# Patient Record
Sex: MALE | Race: OTHER | Employment: OTHER | URBAN - METROPOLITAN AREA
[De-identification: names, ages, dates, MRNs, and addresses within clinical notes are randomized per-mention and may not be internally consistent; named-entity substitution may affect disease eponyms.]

---

## 2017-01-31 ENCOUNTER — HOSPITAL ENCOUNTER (EMERGENCY)
Age: 77
Discharge: HOME | End: 2017-02-01
Attending: EMERGENCY MEDICINE | Admitting: EMERGENCY MEDICINE
Payer: MEDICARE

## 2017-01-31 ENCOUNTER — APPOINTMENT (OUTPATIENT)
Dept: GENERAL RADIOLOGY | Age: 77
End: 2017-01-31
Attending: EMERGENCY MEDICINE
Payer: MEDICARE

## 2017-01-31 DIAGNOSIS — R07.9 CHEST PAIN, UNSPECIFIED TYPE: Primary | ICD-10-CM

## 2017-01-31 DIAGNOSIS — J90 PLEURAL EFFUSION: ICD-10-CM

## 2017-01-31 DIAGNOSIS — I10 ESSENTIAL HYPERTENSION: ICD-10-CM

## 2017-01-31 LAB
CK MB CFR SERPL CALC: 1.7 % (ref 0–4)
CK MB SERPL-MCNC: 0.6 NG/ML (ref 0.5–3.6)
CK SERPL-CCNC: 36 U/L (ref 39–308)
TROPONIN I SERPL-MCNC: <0.02 NG/ML (ref 0–0.04)

## 2017-01-31 PROCEDURE — 85027 COMPLETE CBC AUTOMATED: CPT | Performed by: EMERGENCY MEDICINE

## 2017-01-31 PROCEDURE — 85379 FIBRIN DEGRADATION QUANT: CPT | Performed by: EMERGENCY MEDICINE

## 2017-01-31 PROCEDURE — 93005 ELECTROCARDIOGRAM TRACING: CPT

## 2017-01-31 PROCEDURE — 80048 BASIC METABOLIC PNL TOTAL CA: CPT | Performed by: EMERGENCY MEDICINE

## 2017-01-31 PROCEDURE — 83880 ASSAY OF NATRIURETIC PEPTIDE: CPT | Performed by: EMERGENCY MEDICINE

## 2017-01-31 PROCEDURE — 71010 XR CHEST PORT: CPT

## 2017-01-31 PROCEDURE — 99285 EMERGENCY DEPT VISIT HI MDM: CPT

## 2017-01-31 PROCEDURE — 82550 ASSAY OF CK (CPK): CPT | Performed by: EMERGENCY MEDICINE

## 2017-02-01 ENCOUNTER — APPOINTMENT (OUTPATIENT)
Dept: CT IMAGING | Age: 77
End: 2017-02-01
Attending: EMERGENCY MEDICINE
Payer: MEDICARE

## 2017-02-01 VITALS
BODY MASS INDEX: 26.81 KG/M2 | HEIGHT: 69 IN | OXYGEN SATURATION: 96 % | RESPIRATION RATE: 27 BRPM | HEART RATE: 105 BPM | WEIGHT: 181 LBS | DIASTOLIC BLOOD PRESSURE: 85 MMHG | SYSTOLIC BLOOD PRESSURE: 150 MMHG

## 2017-02-01 LAB
ANION GAP BLD CALC-SCNC: 9 MMOL/L (ref 3–18)
ATRIAL RATE: 101 BPM
ATRIAL RATE: 86 BPM
BNP SERPL-MCNC: 1809 PG/ML (ref 0–900)
BUN SERPL-MCNC: 20 MG/DL (ref 7–18)
BUN/CREAT SERPL: 24 (ref 12–20)
CALCIUM SERPL-MCNC: 8.5 MG/DL (ref 8.5–10.1)
CALCULATED P AXIS, ECG09: 28 DEGREES
CALCULATED P AXIS, ECG09: 46 DEGREES
CALCULATED R AXIS, ECG10: 15 DEGREES
CALCULATED R AXIS, ECG10: 29 DEGREES
CALCULATED T AXIS, ECG11: 3 DEGREES
CALCULATED T AXIS, ECG11: 5 DEGREES
CHLORIDE SERPL-SCNC: 102 MMOL/L (ref 100–108)
CK MB CFR SERPL CALC: 2.1 % (ref 0–4)
CK MB SERPL-MCNC: 0.8 NG/ML (ref 0.5–3.6)
CK SERPL-CCNC: 38 U/L (ref 39–308)
CO2 SERPL-SCNC: 26 MMOL/L (ref 21–32)
CREAT SERPL-MCNC: 0.85 MG/DL (ref 0.6–1.3)
D DIMER PPP FEU-MCNC: 1.39 UG/ML(FEU)
DIAGNOSIS, 93000: NORMAL
DIAGNOSIS, 93000: NORMAL
ERYTHROCYTE [DISTWIDTH] IN BLOOD BY AUTOMATED COUNT: 14 % (ref 11.6–14.5)
GLUCOSE SERPL-MCNC: 100 MG/DL (ref 74–99)
HCT VFR BLD AUTO: 36.2 % (ref 36–48)
HGB BLD-MCNC: 12.4 G/DL (ref 13–16)
MCH RBC QN AUTO: 29.7 PG (ref 24–34)
MCHC RBC AUTO-ENTMCNC: 34.3 G/DL (ref 31–37)
MCV RBC AUTO: 86.6 FL (ref 74–97)
P-R INTERVAL, ECG05: 178 MS
P-R INTERVAL, ECG05: 182 MS
PLATELET # BLD AUTO: 344 K/UL (ref 135–420)
PMV BLD AUTO: 10.1 FL (ref 9.2–11.8)
POTASSIUM SERPL-SCNC: 4.1 MMOL/L (ref 3.5–5.5)
Q-T INTERVAL, ECG07: 360 MS
Q-T INTERVAL, ECG07: 378 MS
QRS DURATION, ECG06: 96 MS
QRS DURATION, ECG06: 96 MS
QTC CALCULATION (BEZET), ECG08: 452 MS
QTC CALCULATION (BEZET), ECG08: 466 MS
RBC # BLD AUTO: 4.18 M/UL (ref 4.7–5.5)
SODIUM SERPL-SCNC: 137 MMOL/L (ref 136–145)
TROPONIN I SERPL-MCNC: <0.02 NG/ML (ref 0–0.04)
VENTRICULAR RATE, ECG03: 101 BPM
VENTRICULAR RATE, ECG03: 86 BPM
WBC # BLD AUTO: 11.2 K/UL (ref 4.6–13.2)

## 2017-02-01 PROCEDURE — 74011636320 HC RX REV CODE- 636/320: Performed by: EMERGENCY MEDICINE

## 2017-02-01 PROCEDURE — 74011250637 HC RX REV CODE- 250/637: Performed by: EMERGENCY MEDICINE

## 2017-02-01 PROCEDURE — 82550 ASSAY OF CK (CPK): CPT | Performed by: EMERGENCY MEDICINE

## 2017-02-01 PROCEDURE — 93005 ELECTROCARDIOGRAM TRACING: CPT

## 2017-02-01 PROCEDURE — 71275 CT ANGIOGRAPHY CHEST: CPT

## 2017-02-01 RX ORDER — LISINOPRIL AND HYDROCHLOROTHIAZIDE 10; 12.5 MG/1; MG/1
1 TABLET ORAL DAILY
Qty: 30 TAB | Refills: 0 | Status: SHIPPED | OUTPATIENT
Start: 2017-02-01 | End: 2019-10-11

## 2017-02-01 RX ORDER — LISINOPRIL AND HYDROCHLOROTHIAZIDE 12.5; 2 MG/1; MG/1
1 TABLET ORAL
Status: COMPLETED | OUTPATIENT
Start: 2017-02-01 | End: 2017-02-01

## 2017-02-01 RX ADMIN — IOPAMIDOL 100 ML: 755 INJECTION, SOLUTION INTRAVENOUS at 04:42

## 2017-02-01 RX ADMIN — LISINOPRIL AND HYDROCHLOROTHIAZIDE 1 TABLET: 12.5; 2 TABLET ORAL at 05:44
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
## 2017-02-01 NOTE — DISCHARGE INSTRUCTIONS
Chest Pain: Care Instructions  Your Care Instructions  There are many things that can cause chest pain. Some are not serious and will get better on their own in a few days. But some kinds of chest pain need more testing and treatment. Your doctor may have recommended a follow-up visit in the next 8 to 12 hours. If you are not getting better, you may need more tests or treatment. Even though your doctor has released you, you still need to watch for any problems. The doctor carefully checked you, but sometimes problems can develop later. If you have new symptoms or if your symptoms do not get better, get medical care right away. If you have worse or different chest pain or pressure that lasts more than 5 minutes or you passed out (lost consciousness), call 911 or seek other emergency help right away. A medical visit is only one step in your treatment. Even if you feel better, you still need to do what your doctor recommends, such as going to all suggested follow-up appointments and taking medicines exactly as directed. This will help you recover and help prevent future problems. How can you care for yourself at home? · Rest until you feel better. · Take your medicine exactly as prescribed. Call your doctor if you think you are having a problem with your medicine. · Do not drive after taking a prescription pain medicine. When should you call for help? Call 911 if:  · You passed out (lost consciousness). · You have severe difficulty breathing. · You have symptoms of a heart attack. These may include:  ¨ Chest pain or pressure, or a strange feeling in your chest.  ¨ Sweating. ¨ Shortness of breath. ¨ Nausea or vomiting. ¨ Pain, pressure, or a strange feeling in your back, neck, jaw, or upper belly or in one or both shoulders or arms. ¨ Lightheadedness or sudden weakness. ¨ A fast or irregular heartbeat.   After you call 911, the  may tell you to chew 1 adult-strength or 2 to 4 low-dose aspirin. Wait for an ambulance. Do not try to drive yourself. Call your doctor today if:  · You have any trouble breathing. · Your chest pain gets worse. · You are dizzy or lightheaded, or you feel like you may faint. · You are not getting better as expected. · You are having new or different chest pain. Where can you learn more? Go to http://nurys-inkos.info/. Enter A120 in the search box to learn more about \"Chest Pain: Care Instructions. \"  Current as of: May 27, 2016  Content Version: 11.1  © 7607-0003 wuaki.tv. Care instructions adapted under license by [employer] (which disclaims liability or warranty for this information). If you have questions about a medical condition or this instruction, always ask your healthcare professional. [address] any warranty or liability for your use of this information. High Blood Pressure: Care Instructions  Your Care Instructions  If your blood pressure is usually above 140/90, you have high blood pressure, or hypertension. That means the top number is 140 or higher or the bottom number is 90 or higher, or both. Despite what a lot of people think, high blood pressure usually doesn't cause headaches or make you feel dizzy or lightheaded. It usually has no symptoms. But it does increase your risk for heart attack, stroke, and kidney or eye damage. The higher your blood pressure, the more your risk increases. Your doctor will give you a goal for your blood pressure. Your goal will be based on your health and your age. An example of a goal is to keep your blood pressure below 140/90. Lifestyle changes, such as eating healthy and being active, are always important to help lower blood pressure. You might also take medicine to reach your blood pressure goal.  Follow-up care is a key part of your treatment and safety.  Be sure to make and go to all appointments, and call your doctor if you are having problems. It's also a good idea to know your test results and keep a list of the medicines you take. How can you care for yourself at home? Medical treatment  · If you stop taking your medicine, your blood pressure will go back up. You may take one or more types of medicine to lower your blood pressure. Be safe with medicines. Take your medicine exactly as prescribed. Call your doctor if you think you are having a problem with your medicine. · Talk to your doctor before you start taking aspirin every day. Aspirin can help certain people lower their risk of a heart attack or stroke. But taking aspirin isn't right for everyone, because it can cause serious bleeding. · See your doctor regularly. You may need to see the doctor more often at first or until your blood pressure comes down. · If you are taking blood pressure medicine, talk to your doctor before you take decongestants or anti-inflammatory medicine, such as ibuprofen. Some of these medicines can raise blood pressure. · Learn how to check your blood pressure at home. Lifestyle changes  · Stay at a healthy weight. This is especially important if you put on weight around the waist. Losing even 10 pounds can help you lower your blood pressure. · If your doctor recommends it, get more exercise. Walking is a good choice. Bit by bit, increase the amount you walk every day. Try for at least 30 minutes on most days of the week. You also may want to swim, bike, or do other activities. · Avoid or limit alcohol. Talk to your doctor about whether you can drink any alcohol. · Try to limit how much sodium you eat to less than 2,300 milligrams (mg) a day. Your doctor may ask you to try to eat less than 1,500 mg a day. · Eat plenty of fruits (such as bananas and oranges), vegetables, legumes, whole grains, and low-fat dairy products. · Lower the amount of saturated fat in your diet. Saturated fat is found in animal products such as milk, cheese, and meat. Limiting these foods may help you lose weight and also lower your risk for heart disease. · Do not smoke. Smoking increases your risk for heart attack and stroke. If you need help quitting, talk to your doctor about stop-smoking programs and medicines. These can increase your chances of quitting for good. When should you call for help? Call 911 anytime you think you may need emergency care. This may mean having symptoms that suggest that your blood pressure is causing a serious heart or blood vessel problem. Your blood pressure may be over 180/110. For example, call 911 if:  · You have symptoms of a heart attack. These may include:  ¨ Chest pain or pressure, or a strange feeling in the chest.  ¨ Sweating. ¨ Shortness of breath. ¨ Nausea or vomiting. ¨ Pain, pressure, or a strange feeling in the back, neck, jaw, or upper belly or in one or both shoulders or arms. ¨ Lightheadedness or sudden weakness. ¨ A fast or irregular heartbeat. · You have symptoms of a stroke. These may include:  ¨ Sudden numbness, tingling, weakness, or loss of movement in your face, arm, or leg, especially on only one side of your body. ¨ Sudden vision changes. ¨ Sudden trouble speaking. ¨ Sudden confusion or trouble understanding simple statements. ¨ Sudden problems with walking or balance. ¨ A sudden, severe headache that is different from past headaches. · You have severe back or belly pain. Do not wait until your blood pressure comes down on its own. Get help right away. Call your doctor now or seek immediate care if:  · Your blood pressure is much higher than normal (such as 180/110 or higher), but you don't have symptoms. · You think high blood pressure is causing symptoms, such as:  ¨ Severe headache. ¨ Blurry vision. Watch closely for changes in your health, and be sure to contact your doctor if:  · Your blood pressure measures 140/90 or higher at least 2 times.  That means the top number is 140 or higher or the bottom number is 90 or higher, or both. · You think you may be having side effects from your blood pressure medicine. · Your blood pressure is usually normal, but it goes above normal at least 2 times. Where can you learn more? Go to http://nurys-nikos.info/. Enter Z860 in the search box to learn more about \"High Blood Pressure: Care Instructions. \"  Current as of: August 8, 2016  Content Version: 11.1  © 7728-1048 "[employer].". Care instructions adapted under license by [employer] (which disclaims liability or warranty for this information). If you have questions about a medical condition or this instruction, always ask your healthcare professional. [address] any warranty or liability for your use of this information. Learning About Pleural Effusion  What is pleural effusion? Pleural effusion (say \"PLER-astrid iz-VWIH-oinu\") is the buildup of fluid in the space between tissues lining the lungs and chest wall. Because of the fluid buildup, the lungs may not be able to expand completely, which can make it hard to breathe. The lung, or part of it, may collapse. Pleural effusion has many causes, such as pneumonia, cancer, inflammation of the tissues around the lungs, and heart failure. Pleural effusion is usually diagnosed with an X-ray and a physical exam. The doctor listens to the air flow in your lungs. What are the symptoms? Symptoms of pleural effusion may include:  · Trouble breathing. · Shortness of breath. · Chest pain. · Fever. · A cough. Minor pleural effusion may not cause any symptoms. How is pleural effusion treated? Doctors may need to treat the condition that is causing pleural effusion. For example, you may get medicines to treat pneumonia or congestive heart failure. Minor pleural effusion often goes away on its own without treatment.   Removing fluid  Pleural effusion can be treated by removing fluid from the space between the tissues around the lungs. This is done with a needle that's put into the chest (thoracentesis). A small amount of the fluid may be sent to a lab to find out what is causing the buildup of fluid. Removing the fluid may help to relieve symptoms, such as shortness of breath and chest pain. It can help the lungs to expand more fully. In some cases, if pleural effusion doesn't get better, a catheter may be placed in the chest. This is a flexible tube that allows fluid to drain from the lungs. The catheter stays in the chest until the doctor removes it. Some people may get a treatment that removes the fluid and then puts a medicine into the chest cavity. This helps to prevent too much fluid from building up again. Follow-up care is a key part of your treatment and safety. Be sure to make and go to all appointments, and call your doctor if you are having problems. It's also a good idea to know your test results and keep a list of the medicines you take. Where can you learn more? Go to http://nurys-nikos.info/. Enter A920 in the search box to learn more about \"Learning About Pleural Effusion. \"  Current as of: May 23, 2016  Content Version: 11.1  © 3550-5958 [employer]etics. Care instructions adapted under license by Eventmag.[employer] (which disclaims liability or warranty for this information). If you have questions about a medical condition or this instruction, always ask your healthcare professional. [place] [address] any warranty or liability for your use of this information.

## 2017-02-01 NOTE — ED NOTES
Pt arrived to the ED by EMS with co [facility]. Pt had chest pain starting yesterday so went to be seen. Pt received 324 mg ASA. [address] [facility] w/ [place]

## 2017-02-01 NOTE — ED NOTES
Pt co CP since yesterday at 1030 AM 6/10 pain intermittent. CP now is at a constant dull 1/10 upper left chest area. Pt states, \"The only reason I'm here is because of my daughter. \"

## 2017-02-01 NOTE — ED NOTES
Chest pain, differential to include coronary artery disease related,pericardial disease, vascular disease, PE, esophageal or gastric conditions, gallbladder disease,musculoskeletal abnormalities,other possible etiologies. Initial enzymes normal, will trend.

## 2018-08-27 ENCOUNTER — HOSPITAL ENCOUNTER (OUTPATIENT)
Dept: LAB | Age: 78
Discharge: HOME | End: 2018-08-27
Payer: COMMERCIAL

## 2018-08-27 ENCOUNTER — OFFICE VISIT (OUTPATIENT)
Dept: UROLOGY | Age: 78
End: 2018-08-27

## 2018-08-27 VITALS
BODY MASS INDEX: 25.33 KG/M2 | OXYGEN SATURATION: 97 % | DIASTOLIC BLOOD PRESSURE: 68 MMHG | TEMPERATURE: 97.6 F | HEIGHT: 69 IN | SYSTOLIC BLOOD PRESSURE: 130 MMHG | HEART RATE: 77 BPM | WEIGHT: 171 LBS

## 2018-08-27 DIAGNOSIS — N40.0 BENIGN PROSTATIC HYPERPLASIA, UNSPECIFIED WHETHER LOWER URINARY TRACT SYMPTOMS PRESENT: ICD-10-CM

## 2018-08-27 DIAGNOSIS — N50.89 SWOLLEN TESTICLE: Primary | ICD-10-CM

## 2018-08-27 DIAGNOSIS — N49.2 SCROTAL WALL ABSCESS: ICD-10-CM

## 2018-08-27 LAB
BILIRUB UR QL STRIP: NEGATIVE
GLUCOSE UR-MCNC: NEGATIVE MG/DL
KETONES P FAST UR STRIP-MCNC: NEGATIVE MG/DL
PH UR STRIP: 6 [PH] (ref 4.6–8)
PROT UR QL STRIP: NEGATIVE
PSA SERPL-MCNC: 27.3 NG/ML (ref 0–4)
SP GR UR STRIP: 1.02 (ref 1–1.03)
UA UROBILINOGEN AMB POC: NORMAL (ref 0.2–1)
URINALYSIS CLARITY POC: CLEAR
URINALYSIS COLOR POC: YELLOW
URINE BLOOD POC: NEGATIVE
URINE LEUKOCYTES POC: NEGATIVE
URINE NITRITES POC: NEGATIVE

## 2018-08-27 PROCEDURE — 84153 ASSAY OF PSA TOTAL: CPT | Performed by: UROLOGY

## 2018-08-27 RX ORDER — CIPROFLOXACIN 500 MG/1
500 TABLET ORAL 2 TIMES DAILY
Qty: 20 TAB | Refills: 0 | Status: SHIPPED | OUTPATIENT
Start: 2018-08-27 | End: 2018-09-06

## 2018-08-27 RX ORDER — OXYCODONE HYDROCHLORIDE 5 MG/1
5 TABLET ORAL
Qty: 18 TAB | Refills: 0 | Status: SHIPPED | OUTPATIENT
Start: 2018-08-27 | End: 2019-10-08

## 2018-08-27 NOTE — MR AVS SNAPSHOT
[address] [facility] [address] [id number] 
[phone] Patient: Mauro Garcia MRN: [id number] Visit Information Date & Time Provider Department Dept. Phone Encounter #  
 8/27/2018 10:30 AM Ariana CindiNadiya [place] [facility] [id number] Upcoming Health Maintenance Date Due DTaP/Tdap/Td series (1 - Tdap) 10/31/1963 ZOSTER VACCINE AGE 60> 8/31/2002 GLAUCOMA SCREENING Q2Y 10/31/2007 Pneumococcal 65+ Low/Medium Risk (1 of 2 - PCV13) 10/31/2007 Influenza Age 5 to Adult 8/1/2018 Allergies as of 8/27/2018  Review Complete On: 8/27/2018 By: Fernanda Sender No Known Allergies Current Immunizations  Never Reviewed No immunizations on file. Not reviewed this visit You Were Diagnosed With   
  
 Codes Comments Swollen testicle    -  Primary ICD-10-CM: N50.89 ICD-9-CM: 708.70 Vitals BP Pulse Temp Height(growth percentile) Weight(growth percentile) SpO2  
 130/68 (BP 1 Location: Left arm, BP Patient Position: Sitting) 77 97.6 °F (36.4 °C) (Oral) 5' 9\" (1.753 m) 171 lb (77.6 kg) 97% BMI Smoking Status 25.25 kg/m2 Former Smoker Vitals History BMI and BSA Data Body Mass Index Body Surface Area  
 25.25 kg/m 2 1.94 m 2 Preferred Pharmacy Pharmacy Name Phone [address], [place] [address] [phone] [phone] Your Updated Medication List  
  
   
This list is accurate as of 8/27/18 11:41 AM.  Always use your most recent med list.  
  
  
  
  
 lisinopril-hydroCHLOROthiazide 10-12.5 mg per tablet Commonly known as:  Bridget Blandon Take 1 Tab by mouth daily. We Performed the Following AMB POC URINALYSIS DIP STICK AUTO W/O MICRO [22514 CPT(R)] Patient Instructions Testicular Mass: Care Instructions Your Care Instructions A lump or mass in your testicle may be a minor problem, or it may be more serious. Minor causes include a buildup of fluid, a cyst, or a varicose vein in your scrotum. More serious causes include infection or a tumor. You may have an ultrasound, an X-ray, or a CT scan to find out what is causing the mass. Or you may have a blood test. 
Follow-up care is a key part of your treatment and safety. Be sure to make and go to all appointments, and call your doctor if you are having problems. It's also a good idea to know your test results and keep a list of the medicines you take. How can you care for yourself at home? · Take your medicine exactly as prescribed. If your doctor prescribed antibiotics, take them as directed. Do not stop taking them just because you feel better. You need to take the full course of antibiotics. · Be safe with medicines. Take pain medicines exactly as directed. ¨ If the doctor gave you a prescription medicine for pain, take it as prescribed. ¨ If you are not taking a prescription pain medicine, take an over-the-counter medicine such as acetaminophen (Tylenol), ibuprofen (Advil, Motrin), or naproxen (Aleve). Read and follow all instructions on the label. ¨ Do not take two or more pain medicines at the same time unless the doctor told you to. Many pain medicines have acetaminophen, which is Tylenol. Too much acetaminophen (Tylenol) can be harmful. · Learn how to check your testicles so you can see if the lump changes. When should you call for help? Call your doctor now or seek immediate medical care if: 
  · You have severe pain in a testicle. It could be a sign of a twisted testicle. This is an emergency.  
  · You have new or worsening pain in a testicle.  
 Watch closely for changes in your health, and be sure to contact your doctor if: 
  · You have a new or growing mass in a testicle.  
  · You see a change in a testicle.   · You are not getting better as expected. Where can you learn more? Go to http://nurys-nikos.info/. Enter J280 in the search box to learn more about \"Testicular Mass: Care Instructions. \" Current as of: May 12, 2017 Content Version: 11.7 © 1114-4352 [employer]ev[employer], [employer]. Care instructions adapted under license by Gene Solutions (which disclaims liability or warranty for this information). If you have questions about a medical condition or this instruction, always ask your healthcare professional. [address] any warranty or liability for your use of this information. Introducing [facility]! [facility] introduces CrossCurrent patient portal. Now you can access parts of your medical record, email your doctor's office, and request medication refills online. 1. In your internet browser, go to [email or web address] [email or web address]/[email or web address] 2. Click on the First Time User? Click Here link in the Sign In box. You will see the New Member Sign Up page. 3. Enter your CrossCurrent Access Code exactly as it appears below. You will not need to use this code after youve completed the sign-up process. If you do not sign up before the expiration date, you must request a new code. · CrossCurrent Access Code: [email or web address] Expires: 11/25/2018 11:41 AM 
 
4. Enter the last four digits of your Social Security Number (xxxx) and Date of Birth (mm/dd/yyyy) as indicated and click Submit. You will be taken to the next sign-up page. 5. Create a NMRKTt ID. This will be your CrossCurrent login ID and cannot be changed, so think of one that is secure and easy to remember. 6. Create a CrossCurrent password. You can change your password at any time. 7. Enter your Password Reset Question and Answer. This can be used at a later time if you forget your password. 8. Enter your e-mail address. You will receive e-mail notification when new information is available in [address] 9. Click Sign Up. You can now view and download portions of your medical record. 10. Click the Download Summary menu link to download a portable copy of your medical information. If you have questions, please visit the Frequently Asked Questions section of the oneDrum website. Remember, oneDrum is NOT to be used for urgent needs. For medical emergencies, dial 911. Now available from your iPhone and Android! Please provide this summary of care documentation to your next provider. Your primary care clinician is listed as Emmett Smith. If you have any questions after today's visit, please call [phone].

## 2018-08-27 NOTE — PROGRESS NOTES
Mr. Garcia has a reminder for a \"due or due soon\" health maintenance. I have asked that he contact his primary care provider for follow-up on this health maintenance.

## 2018-08-27 NOTE — PROGRESS NOTES
Sulaiman Garcia 78 y.o. male     Mr. Garcia seen today for evaluation of painful swelling in the right inguinal scrotal region progressing over the past 5 days now with a purulent discharge  No irritative or obstructive voiding symptoms  No lower GI symptoms  Mild irritative and obstructive voiding symptoms    Review of Systems:   CNS: No seizure syncope headaches dizziness or visual changes  Respiratory: No wheezing shortness of breath chest pain or coughing  Cardiovascular: No palpitations no angina  Intestinal: No dyspepsia diarrhea or constipation  Urinary:  mild obstructive and irritative voiding symptoms   Skeletal: No bone or joint pain  Endocrine: No diabetes or thyroid disease  Other:    Allergies: No Known Allergies   Medications:    Current Outpatient Prescriptions   Medication Sig Dispense Refill    ciprofloxacin HCl (CIPRO) 500 mg tablet Take 1 Tab by mouth two (2) times a day for 10 days. 20 Tab 0    oxyCODONE IR (ROXICODONE) 5 mg immediate release tablet Take 1 Tab by mouth every four (4) hours as needed for Pain. Max Daily Amount: 30 mg. Indications: Pain 18 Tab 0    lisinopril-hydroCHLOROthiazide (PRINZIDE, ZESTORETIC) 10-12.5 mg per tablet Take 1 Tab by mouth daily. 30 Tab 0       Past Medical History:   Diagnosis Date    Hypertension       Past Surgical History:   Procedure Laterality Date    HX TONSILLECTOMY      HX VASECTOMY       Social History     Social History    Marital status: SINGLE     Spouse name: N/A    Number of children: N/A    Years of education: N/A     Occupational History    Not on file.      Social History Main Topics    Smoking status: Former Smoker    Smokeless tobacco: Never Used    Alcohol use Yes      Comment: occasionally    Drug use: No    Sexual activity: No     Other Topics Concern    Not on file     Social History Narrative      Family History   Problem Relation Age of Onset    Cancer Sister      breast        Physical Examination: Well-nourished mature male distressed with painful swelling in the distal right inguinal scrotal region    Abdomen is nontender no palpable masses no organomegaly  Back-no percussion CVA tenderness on either side  No inguinal hernia or adenopathy  Penis is normal  Testes are normal in size shape and consistency bilaterally-no epididymal induration or tenderness on either side  Spermatic cords are palpably normal bilaterally  Scrotum is normal in its dependent portion 3 x 4 cm indurated erythema with central fluctuance at the junction of upper right scrotum and inguinal canal-no crepitus palpated      Urinalysis: Negative dipstick/nitrite negative/heme-negative    Procedure Note                                                            I&D Scrotal Abscess    After prepping the scrotum and right inguinal region with antibiotic scrub and solution a stab incision was made in the fluctuant tender portion of the right scrotum opening a purulent abscess-CNS abscess fluid-1/4 inch iodoform wick inserted-gauze dressing applied procedure was uncomplicated and well-tolerated-  EBL-minimal  Specimen-C&S abscess discharge    Impression: Scrotal abscess stable status post I&D        Plan: Iodoform wick ×24 hours-3 times daily dressing change-CNS scrotal abscess contents    Rx Cipro 5 mg twice daily #20  Oxycodone 5 mg every 6 hours as needed pain #12    PSA today    RTC 1 week/precautions-see prn fever increased inflammation, pain etc.        Christian Simms MD  -electronically signed-    PLEASE NOTE:  This document has been produced using voice recognition software. Unrecognized errors in transcription may be present.

## 2018-08-27 NOTE — PATIENT INSTRUCTIONS
Testicular Mass: Care Instructions  Your Care Instructions    A lump or mass in your testicle may be a minor problem, or it may be more serious. Minor causes include a buildup of fluid, a cyst, or a varicose vein in your scrotum. More serious causes include infection or a tumor. You may have an ultrasound, an X-ray, or a CT scan to find out what is causing the mass. Or you may have a blood test.  Follow-up care is a key part of your treatment and safety. Be sure to make and go to all appointments, and call your doctor if you are having problems. It's also a good idea to know your test results and keep a list of the medicines you take. How can you care for yourself at home? · Take your medicine exactly as prescribed. If your doctor prescribed antibiotics, take them as directed. Do not stop taking them just because you feel better. You need to take the full course of antibiotics. · Be safe with medicines. Take pain medicines exactly as directed. ¨ If the doctor gave you a prescription medicine for pain, take it as prescribed. ¨ If you are not taking a prescription pain medicine, take an over-the-counter medicine such as acetaminophen (Tylenol), ibuprofen (Advil, Motrin), or naproxen (Aleve). Read and follow all instructions on the label. ¨ Do not take two or more pain medicines at the same time unless the doctor told you to. Many pain medicines have acetaminophen, which is Tylenol. Too much acetaminophen (Tylenol) can be harmful. · Learn how to check your testicles so you can see if the lump changes. When should you call for help? Call your doctor now or seek immediate medical care if:    · You have severe pain in a testicle. It could be a sign of a twisted testicle.  This is an emergency.     · You have new or worsening pain in a testicle.    Watch closely for changes in your health, and be sure to contact your doctor if:    · You have a new or growing mass in a testicle.     · You see a change in a testicle.     · You are not getting better as expected. Where can you learn more? Go to http://nurys-nikso.info/. Enter G390 in the search box to learn more about \"Testicular Mass: Care Instructions. \"  Current as of: May 12, 2017  Content Version: 11.7  © 9130-9531 [employer]. Care instructions adapted under license by [employer] (which disclaims liability or warranty for this information). If you have questions about a medical condition or this instruction, always ask your healthcare professional. [address] any warranty or liability for your use of this information.

## 2018-08-30 LAB — BACTERIA SPEC AEROBE CULT: ABNORMAL

## 2018-08-31 DIAGNOSIS — N49.2 SCROTAL ABSCESS: Primary | ICD-10-CM

## 2018-08-31 RX ORDER — SULFAMETHOXAZOLE AND TRIMETHOPRIM 800; 160 MG/1; MG/1
1 TABLET ORAL 2 TIMES DAILY
Qty: 20 TAB | Refills: 0 | Status: SHIPPED | OUTPATIENT
Start: 2018-08-31 | End: 2018-09-10

## 2018-08-31 NOTE — PROGRESS NOTES
Culture of scrotal abscess positive for staph aureus resistant to Cipro Sensitive to Bactrim Patient advised to discontinue Cipro and instead take Bactrim DS twice daily ×10 days prescription sent to pharmacy by computer email today Jeremiah Arteaga MD

## 2018-09-05 ENCOUNTER — OFFICE VISIT (OUTPATIENT)
Dept: UROLOGY | Age: 78
End: 2018-09-05

## 2018-09-05 ENCOUNTER — HOSPITAL ENCOUNTER (OUTPATIENT)
Dept: LAB | Age: 78
Discharge: HOME | End: 2018-09-05
Payer: COMMERCIAL

## 2018-09-05 VITALS
BODY MASS INDEX: 25.33 KG/M2 | HEART RATE: 77 BPM | SYSTOLIC BLOOD PRESSURE: 154 MMHG | HEIGHT: 69 IN | WEIGHT: 171 LBS | DIASTOLIC BLOOD PRESSURE: 75 MMHG | OXYGEN SATURATION: 98 %

## 2018-09-05 DIAGNOSIS — R97.20 ELEVATED PSA: ICD-10-CM

## 2018-09-05 DIAGNOSIS — N49.2 SCROTAL WALL ABSCESS: Primary | ICD-10-CM

## 2018-09-05 LAB
BILIRUB UR QL STRIP: NEGATIVE
GLUCOSE UR-MCNC: NEGATIVE MG/DL
KETONES P FAST UR STRIP-MCNC: NEGATIVE MG/DL
PH UR STRIP: 6 [PH] (ref 4.6–8)
PROT UR QL STRIP: NEGATIVE
PSA SERPL-MCNC: 29.8 NG/ML (ref 0–4)
SP GR UR STRIP: 1.02 (ref 1–1.03)
UA UROBILINOGEN AMB POC: NORMAL (ref 0.2–1)
URINALYSIS CLARITY POC: CLEAR
URINALYSIS COLOR POC: YELLOW
URINE BLOOD POC: NEGATIVE
URINE LEUKOCYTES POC: NEGATIVE
URINE NITRITES POC: NEGATIVE

## 2018-09-05 PROCEDURE — 84153 ASSAY OF PSA TOTAL: CPT | Performed by: UROLOGY

## 2018-09-05 RX ORDER — GENTAMICIN SULFATE 40 MG/ML
80 INJECTION, SOLUTION INTRAMUSCULAR; INTRAVENOUS ONCE
Qty: 1 VIAL | Refills: 0
Start: 2018-09-05 | End: 2018-09-05

## 2018-09-05 NOTE — MR AVS SNAPSHOT
[address] [facility] [address] [facility] [address] 
[phone] Patient: Coco Garcia MRN: [id number] Visit Information Date & Time Provider Department Dept. Phone Encounter #  
 9/5/2018  2:30 PM [facility] [id number] Upcoming Health Maintenance Date Due DTaP/Tdap/Td series (1 - Tdap) 10/31/1963 ZOSTER VACCINE AGE 60> 8/31/2002 GLAUCOMA SCREENING Q2Y 10/31/2007 Pneumococcal 65+ Low/Medium Risk (1 of 2 - PCV13) 10/31/2007 Influenza Age 5 to Adult 8/1/2018 Allergies as of 9/5/2018  Review Complete On: 9/5/2018 By: Ezella Goldmann, LPN No Known Allergies Current Immunizations  Never Reviewed No immunizations on file. Not reviewed this visit You Were Diagnosed With   
  
 Codes Comments Scrotal wall abscess    -  Primary ICD-10-CM: N49.2 ICD-9-CM: 608.4 Elevated PSA     ICD-10-CM: R97.20 ICD-9-CM: 790.93 Vitals BP Pulse Height(growth percentile) Weight(growth percentile) SpO2 BMI  
 154/75 (BP 1 Location: Left arm, BP Patient Position: Sitting) 77 5' 9\" (1.753 m) 171 lb (77.6 kg) 98% 25.25 kg/m2 Smoking Status Former Smoker Vitals History BMI and BSA Data Body Mass Index Body Surface Area  
 25.25 kg/m 2 1.94 m 2 Preferred Pharmacy Pharmacy Name Phone [address], [place] [address] [place] [address] [address] [facility] [phone] Your Updated Medication List  
  
   
This list is accurate as of 9/5/18  3:19 PM.  Always use your most recent med list.  
  
  
  
  
 ciprofloxacin HCl 500 mg tablet Commonly known as:  CIPRO Take 1 Tab by mouth two (2) times a day for 10 days. lisinopril-hydroCHLOROthiazide 10-12.5 mg per tablet Commonly known as:  Ana Cristina Ada Take 1 Tab by mouth daily. oxyCODONE IR 5 mg immediate release tablet Commonly known as:  Waldemar Hall Take 1 Tab by mouth every four (4) hours as needed for Pain. Max Daily Amount: 30 mg. Indications: Pain  
  
 trimethoprim-sulfamethoxazole 160-800 mg per tablet Commonly known as:  BACTRIM DS, SEPTRA DS Take 1 Tab by mouth two (2) times a day for 10 days. We Performed the Following AMB POC URINALYSIS DIP STICK AUTO W/O MICRO [64009 CPT(R)] COLLECTION VENOUS BLOOD,VENIPUNCTURE C5417964 CPT(R)] Patient Instructions Prostate Cancer Screening: Care Instructions Your Care Instructions The prostate gland is an organ found just below a man's bladder. It is the size and shape of a walnut. It surrounds the tube that carries urine from the bladder out of the body through the penis. This tube is called the urethra. Prostate cancer is the abnormal growth of cells in the prostate. It is the second most common type of cancer in men. (Skin cancer is the most common.) Most cases of prostate cancer occur in men older than 72. The disease runs in families. And it's more common in -American men. When it's found and treated early, prostate cancer may be cured. But it is not always treated. This is because prostate cancer may not shorten your life, especially if you are older and the cancer is growing slowly. Follow-up care is a key part of your treatment and safety. Be sure to make and go to all appointments, and call your doctor if you are having problems. It's also a good idea to know your test results and keep a list of the medicines you take. What are the screening tests for prostate cancer? The main screening test for prostate cancer is the prostate-specific antigen (PSA) test. This is a blood test that measures how much PSA is in your blood. A high level may mean that you have an enlargement, an infection, or cancer.  
Along with the PSA test, you may have a digital rectal exam. The digital (finger) rectal exam checks for anything abnormal in your prostate. To do the exam, the doctor puts a lubricated, gloved finger into your rectum. If these tests suggest cancer, you may need a prostate biopsy. How is prostate cancer diagnosed? In a biopsy, the doctor takes small tissue samples from your prostate gland. Another doctor then looks at the tissue under a microscope to see if there are cancer cells, signs of infection, or other problems. The results help diagnose prostate cancer. What are the pros and cons of screening? Neither a PSA test nor a digital rectal exam can tell you for sure that you do or do not have cancer. But they can help you decide if you need more tests, such as a prostate biopsy. Screening tests may be useful because most men with prostate cancer don't have symptoms. It can be hard to know if you have cancer until it is more advanced. And then it's harder to treat. But having a PSA test can also cause harm. The test may show high levels of PSA that aren't caused by cancer. So you could have a prostate biopsy you didn't need. Or the PSA test might be normal when there is cancer, so a cancer might not be found early. The test can also find cancers that would never have caused a problem during your lifetime. So you might have treatment that was not needed. Prostate cancer usually develops late in life and grows slowly. For many men, it does not shorten their lives. Some experts advise screening only for men who are at high risk. Talk with your doctor to see if screening is right for you. Where can you learn more? Go to http://nurys-nikos.info/. Enter R550 in the search box to learn more about \"Prostate Cancer Screening: Care Instructions. \" Current as of: May 12, 2017 Content Version: 11.7 © 2584-7591 [employer].  Care instructions adapted under license by [employer] (which disclaims liability or warranty for this information). If you have questions about a medical condition or this instruction, always ask your healthcare professional. [place] [address] any warranty or liability for your use of this information. Introducing [facility] SERVICES! [employer] introduces Medic Trace patient portal. Now you can access parts of your medical record, email your doctor's office, and request medication refills online. 1. In your internet browser, go to [email or web address] 2. Click on the First Time User? Click Here link in the Sign In box. You will see the New Member Sign Up page. 3. Enter your Medic Trace Access Code exactly as it appears below. You will not need to use this code after youve completed the sign-up process. If you do not sign up before the expiration date, you must request a new code. · Medic Trace Access Code: [email or web address] Expires: 11/25/2018 11:41 AM 
 
4. Enter the last four digits of your Social Security Number (xxxx) and Date of Birth (mm/dd/yyyy) as indicated and click Submit. You will be taken to the next sign-up page. 5. Create a Medic Trace ID. This will be your Medic Trace login ID and cannot be changed, so think of one that is secure and easy to remember. 6. Create a Medic Trace password. You can change your password at any time. 7. Enter your Password Reset Question and Answer. This can be used at a later time if you forget your password. 8. Enter your e-mail address. You will receive e-mail notification when new information is available in [address] 9. Click Sign Up. You can now view and download portions of your medical record. 10. Click the Download Summary menu link to download a portable copy of your medical information. If you have questions, please visit the Frequently Asked Questions section of the Medic Trace website. Remember, Medic Trace is NOT to be used for urgent needs. For medical emergencies, dial 911. Now available from your iPhone and Android! Please provide this summary of care documentation to your next provider. Your primary care clinician is listed as Sully Roberts. If you have any questions after today's visit, please call [phone].

## 2018-09-05 NOTE — PROGRESS NOTES
Mr. Garcia has a reminder for a \"due or due soon\" health maintenance. I have asked that he contact his primary care provider for follow-up on this health maintenance. RBV Per Dr. Dilia Lauren draw lab today for PSA for Elevated PSA.

## 2018-09-05 NOTE — PATIENT INSTRUCTIONS
Prostate Cancer Screening: Care Instructions  Your Care Instructions    The prostate gland is an organ found just below a man's bladder. It is the size and shape of a walnut. It surrounds the tube that carries urine from the bladder out of the body through the penis. This tube is called the urethra. Prostate cancer is the abnormal growth of cells in the prostate. It is the second most common type of cancer in men. (Skin cancer is the most common.)  Most cases of prostate cancer occur in men older than 72. The disease runs in families. And it's more common in -American men. When it's found and treated early, prostate cancer may be cured. But it is not always treated. This is because prostate cancer may not shorten your life, especially if you are older and the cancer is growing slowly. Follow-up care is a key part of your treatment and safety. Be sure to make and go to all appointments, and call your doctor if you are having problems. It's also a good idea to know your test results and keep a list of the medicines you take. What are the screening tests for prostate cancer? The main screening test for prostate cancer is the prostate-specific antigen (PSA) test. This is a blood test that measures how much PSA is in your blood. A high level may mean that you have an enlargement, an infection, or cancer. Along with the PSA test, you may have a digital rectal exam. The digital (finger) rectal exam checks for anything abnormal in your prostate. To do the exam, the doctor puts a lubricated, gloved finger into your rectum. If these tests suggest cancer, you may need a prostate biopsy. How is prostate cancer diagnosed? In a biopsy, the doctor takes small tissue samples from your prostate gland. Another doctor then looks at the tissue under a microscope to see if there are cancer cells, signs of infection, or other problems. The results help diagnose prostate cancer.   What are the pros and cons of screening? Neither a PSA test nor a digital rectal exam can tell you for sure that you do or do not have cancer. But they can help you decide if you need more tests, such as a prostate biopsy. Screening tests may be useful because most men with prostate cancer don't have symptoms. It can be hard to know if you have cancer until it is more advanced. And then it's harder to treat. But having a PSA test can also cause harm. The test may show high levels of PSA that aren't caused by cancer. So you could have a prostate biopsy you didn't need. Or the PSA test might be normal when there is cancer, so a cancer might not be found early. The test can also find cancers that would never have caused a problem during your lifetime. So you might have treatment that was not needed. Prostate cancer usually develops late in life and grows slowly. For many men, it does not shorten their lives. Some experts advise screening only for men who are at high risk. Talk with your doctor to see if screening is right for you. Where can you learn more? Go to http://nurys-nikos.info/. Enter R550 in the search box to learn more about \"Prostate Cancer Screening: Care Instructions. \"  Current as of: May 12, 2017  Content Version: 11.7  © 9243-3175 [employer]. Care instructions adapted under license by [employer] (which disclaims liability or warranty for this information). If you have questions about a medical condition or this instruction, always ask your healthcare professional. [facility] [address] any warranty or liability for your use of this information.

## 2018-09-06 NOTE — PROGRESS NOTES
Opal Garcia 78 y.o. male     Mr. Garcia seen today for scrotal abscess follow-up with elevated PSA found on routine testing  Patient notes recurrent pain and swelling in the right upper scrotum following initial improvement with drainage from I&D on 27 August 2018  CNS abscess discharge positive for staph aureus sensitive to Septra resistant to Cipro/allergic to sulfa patient complains of intermittent  Palpable mass in right inguinal region in addition to tender scrotal wall abscess in same region    PSA 27.3 on 27 August 2018    Review of Systems:   CNS: No seizure syncope headaches dizziness or visual changes  Respiratory: No wheezing shortness of breath chest pain or coughing  Cardiovascular: No palpitations no angina  Intestinal: No dyspepsia diarrhea or constipation  Urinary:  mild obstructive and irritative voiding symptoms   Skeletal: No bone or joint pain  Endocrine: No diabetes or thyroid disease  Other:       Allergies: No Known Allergies   Medications:    Current Outpatient Prescriptions   Medication Sig Dispense Refill    ciprofloxacin HCl (CIPRO) 500 mg tablet Take 1 Tab by mouth two (2) times a day for 10 days. 20 Tab 0    lisinopril-hydroCHLOROthiazide (PRINZIDE, ZESTORETIC) 10-12.5 mg per tablet Take 1 Tab by mouth daily. 30 Tab 0    trimethoprim-sulfamethoxazole (BACTRIM DS, SEPTRA DS) 160-800 mg per tablet Take 1 Tab by mouth two (2) times a day for 10 days. 20 Tab 0    oxyCODONE IR (ROXICODONE) 5 mg immediate release tablet Take 1 Tab by mouth every four (4) hours as needed for Pain. Max Daily Amount: 30 mg. Indications: Pain 18 Tab 0       Past Medical History:   Diagnosis Date    Hypertension       Past Surgical History:   Procedure Laterality Date    HX TONSILLECTOMY      HX VASECTOMY       Social History     Social History    Marital status: SINGLE     Spouse name: N/A    Number of children: N/A    Years of education: N/A     Occupational History    Not on file.      Social History Main Topics    Smoking status: Former Smoker    Smokeless tobacco: Never Used    Alcohol use Yes      Comment: occasionally    Drug use: No    Sexual activity: No     Other Topics Concern    Not on file     Social History Narrative      Family History   Problem Relation Age of Onset    Cancer Sister      breast        Physical Examination: Well-nourished mature male in no apparent distress    Upper anterior right scrotal wall swelling erythema and fluctuance with purulent drainage from punctate ostium-  no palpable inguinal hernia on either side    Procedure Note:                                             Abscess Incision and Drainage     1% lidocaine local anesthesia infiltrated into the skin surrounding the scrotal abscess on the upper anterior right side of the scrotum -scalpel incision and insertion of gauze wick   EBL-minimal  Segment-none    Urinalysis: Negative dipstick/nitrite negative    Impression: Right scrotal wall abscess, recurrent-incised and drained today                        Right inguinal hernia                        Elevated PSA-prostate biopsy is indicated and recommended        Plan: Gentamicin 80 mg IM today            Counseled today regarding technique a transperineal prostate biopsy including risk of bleeding, infection, and urinary retention             Attendant required/plan IM gentamicin prostate biopsy prophylaxis    Repeat PSA today    RTC 3 weeks prostate biopsy      More than 1/2 of this 25 minute visit was spent in counselling and coordination of care, as described above. Jina Bence, MD  -electronically signed-    PLEASE NOTE:  This document has been produced using voice recognition software. Unrecognized errors in transcription may be present.

## 2018-10-02 RX ORDER — CIPROFLOXACIN 500 MG/1
TABLET ORAL
Qty: 6 TAB | Refills: 0 | Status: SHIPPED | OUTPATIENT
Start: 2018-10-02 | End: 2018-10-17 | Stop reason: ALTCHOICE

## 2018-10-02 NOTE — TELEPHONE ENCOUNTER
RBV per Dr Marybel Gallagher ciprofloxacin HCI (Cipro) 500 mg #6 with 0 refills. Take one tablet by mouth twice daily for 3 days. START TAKING MEDICATION THE MORNING OF PROCEDURE.

## 2018-10-10 ENCOUNTER — HOSPITAL ENCOUNTER (OUTPATIENT)
Dept: LAB | Age: 78
Discharge: HOME | End: 2018-10-10
Payer: MEDICARE

## 2018-10-10 ENCOUNTER — OFFICE VISIT (OUTPATIENT)
Dept: UROLOGY | Age: 78
End: 2018-10-10

## 2018-10-10 VITALS
DIASTOLIC BLOOD PRESSURE: 69 MMHG | SYSTOLIC BLOOD PRESSURE: 123 MMHG | WEIGHT: 167 LBS | HEIGHT: 69 IN | BODY MASS INDEX: 24.73 KG/M2 | OXYGEN SATURATION: 94 % | HEART RATE: 86 BPM

## 2018-10-10 DIAGNOSIS — R97.20 ELEVATED PSA: ICD-10-CM

## 2018-10-10 LAB
BILIRUB UR QL STRIP: NEGATIVE
GLUCOSE UR-MCNC: NEGATIVE MG/DL
KETONES P FAST UR STRIP-MCNC: NEGATIVE MG/DL
PH UR STRIP: 6 [PH] (ref 4.6–8)
PROT UR QL STRIP: NEGATIVE
SP GR UR STRIP: 1.02 (ref 1–1.03)
UA UROBILINOGEN AMB POC: NORMAL (ref 0.2–1)
URINALYSIS CLARITY POC: CLEAR
URINALYSIS COLOR POC: YELLOW
URINE BLOOD POC: NEGATIVE
URINE LEUKOCYTES POC: NEGATIVE
URINE NITRITES POC: NEGATIVE

## 2018-10-10 PROCEDURE — G0416 PROSTATE BIOPSY, ANY MTHD: HCPCS | Performed by: UROLOGY

## 2018-10-10 NOTE — PROGRESS NOTES
RBV Per Dr. Marybel Gallagher patient to have prostate tissue which was biopsied in office today sent out for pathology.

## 2018-10-10 NOTE — PROGRESS NOTES
[facility]  OFFICE PROCEDURE PROGRESS NOTE        Chart reviewed for the following:   Rafat JIN, have reviewed the History, Physical and updated the Allergic reactions for Venancio Garcia     TIME OUT performed immediately prior to start of procedure:   Rafat JIN, have performed the following reviews on Kalli Garcia prior to the start of the procedure:            * Patient was identified by name and date of birth   * Agreement on procedure being performed was verified  * Risks and Benefits explained to the patient  * Procedure site verified and marked as necessary  * Patient was positioned for comfort  * Consent was signed and verified     Time: 2:30 PM      Date of procedure: 10/10/2018    Procedure performed by:  Hoda Metcalf MD    Provider assisted by: Rafat Adams MA    Patient assisted by: wife    How tolerated by patient: tolerated the procedure well with no complications    Post Procedural Pain Scale: 0 - No Hurt    Comments: Patient verbalized understanding of procedure and post procedure instructions.

## 2018-10-10 NOTE — MR AVS SNAPSHOT
[address] [facility] [place] [address] [address] 
[phone] Patient: Yasmani Garcia MRN: [id number] Visit Information Date & Time Provider Department Dept. Phone Encounter #  
 10/10/2018  1:45 PM Nadiya Roca [facility] [phone] [id number] Your Appointments 10/17/2018  3:15 PM  
Office Visit with Stacie Moraes MD  
[facility]) Appt Note: Bx results; .  
 [address] [place] [address] [address]  
[phone] [address] [place] [address] Upcoming Health Maintenance Date Due DTaP/Tdap/Td series (1 - Tdap) 10/31/1963 Shingrix Vaccine Age 50> (1 of 2) 10/31/1992 GLAUCOMA SCREENING Q2Y 10/31/2007 Pneumococcal 65+ Low/Medium Risk (1 of 2 - PCV13) 10/31/2007 Influenza Age 5 to Adult 8/1/2018 Allergies as of 10/10/2018  Review Complete On: 10/10/2018 By: Jeremie Harden LPN Severity Noted Reaction Type Reactions Sulfa (Sulfonamide Antibiotics)  10/10/2018    Other (comments) As a child told by mother. Does not know reaction. Current Immunizations  Never Reviewed No immunizations on file. Not reviewed this visit You Were Diagnosed With   
  
 Codes Comments Elevated PSA    -  Primary ICD-10-CM: R97.20 ICD-9-CM: 790.93 Vitals BP Pulse Height(growth percentile) Weight(growth percentile) SpO2 BMI  
 123/69 (BP 1 Location: Left arm, BP Patient Position: Sitting) 86 5' 9\" (1.753 m) 167 lb (75.8 kg) 94% 24.66 kg/m2 Smoking Status Former Smoker Vitals History BMI and BSA Data Body Mass Index Body Surface Area  
 24.66 kg/m 2 1.92 m 2 Preferred Pharmacy Pharmacy Name Phone [address], [place] [phone] [facility] [phone] Your Updated Medication List  
  
   
This list is accurate as of 10/10/18  2:02 PM.  Always use your most recent med list.  
  
  
  
  
 ciprofloxacin HCl 500 mg tablet Commonly known as:  CIPRO Take one tablet by mouth twice daily for 3 days. START TAKING MEDICATION THE MORNING OF PROCEDURE  
  
 lisinopril-hydroCHLOROthiazide 10-12.5 mg per tablet Commonly known as:  Cloyce Jim Take 1 Tab by mouth daily. oxyCODONE IR 5 mg immediate release tablet Commonly known as:  Gina Sour Take 1 Tab by mouth every four (4) hours as needed for Pain. Max Daily Amount: 30 mg. Indications: Pain We Performed the Following AMB POC URINALYSIS DIP STICK AUTO W/O MICRO [86585 CPT(R)] Patient Instructions Prostate Biopsy and Ultrasound: About This Test 
What is it? A prostate biopsy is a type of test. Your doctor takes small tissue samples from your prostate gland. Then another doctor looks at the tissue under a microscope to see if there are cancer cells. This test is done by a doctor who specializes in men's genital and urinary problems (urologist). It can be done in your doctor's office, a day surgery clinic, or a hospital operating room. To get the tissue samples from the prostate, the doctor inserts a thin needle through the rectum, the urethra, or the area between the anus and scrotum (perineum). The most common method is through the rectum. Your doctor may use ultrasound to help guide the needle. Why is this test done? You may need a prostate biopsy if your doctor found something of concern during a digital rectal exam. Or you may need it if a blood test showed a high level of prostate-specific antigen (PSA). A biopsy can help find out if you have prostate cancer. How can you prepare for this test? 
Before you have a prostate biopsy, tell your doctor if: 
· You are taking any medicines or using any herbal supplements. · You are allergic to any medicines. · You have had bleeding problems, or you take aspirin or some other blood thinner. What happens before the test? 
· You may need to have an enema before the test. 
· You will need to take off all or most of your clothes. You will be given a cloth or paper gown to use during the test. 
· You may be given a sedative through a vein (IV) in your arm. The sedative will help you relax and stay still. What happens during the test? 
Through the rectum · You may be asked to kneel, lie on your side, or lie on your back. · Your doctor may inject an anesthetic around the prostate to numb the area before the sample is taken. · Ultrasound is often used to guide the needle to the correct spot. · Your doctor may choose to use a needle guide for the biopsy. He or she will attach the guide to a finger. Your doctor will insert the finger into your rectum. · The needle will enter the prostate and take 6 to 12 samples. Through the urethra · You will lie on your back. Your feet will rest in stirrups. · You will get anesthesia. The anesthesia may make you sleep. Or it may just numb the area being worked on. 
· Your doctor will insert a lighted scope (cystoscope) into your urethra. The scope allows your doctor to look directly at the prostate. Your doctor will pass a cutting loop through the scope to remove samples of prostate tissue. Through the perineum · You will lie on an exam table either on your side or on your back with your knees bent. You will get anesthesia. The anesthesia may make you sleep. Or it may just numb the area being worked on. 
· Your doctor will make a small cut in your perineum. Then he or she will insert a finger into the rectum to hold the prostate. · Your doctor will then insert the needle through the cut and into the prostate. · The needle collects samples of tissue and is then pulled out.  
What else should you know about this test? 
· A prostate biopsy has a slight risk of causing problems such as infection or bleeding. · If the biopsy went through your rectum, you may have a small amount of bleeding from your rectum for 2 to 3 days after the biopsy. · You may have a little pain in your pelvic area. You may also have a little blood in your urine for 1 to 5 days. · You may have some blood in your semen for a week or longer. How long will the test take? · This test will take about 15 to 45 minutes. What happens after the test? 
Your doctor will tell you what to expect and watch for after your test. In general: · If you have anesthesia that makes you sleep, you will be in a recovery room for a few hours. You will need someone to drive you home. You may feel tired for the rest of the day. · You will probably be able to go home right away. · If your doctor had you stop taking any medicine for the biopsy, ask him or her when you can start taking it again. If you were given antibiotics, take them as directed. · Do not do heavy work or exercise for 4 hours after the test. 
· Your doctor will tell you how long it may take to get your results back. Follow-up care is a key part of your treatment and safety. Be sure to make and go to all appointments, and call your doctor if you are having problems. It's also a good idea to keep a list of the medicines you take. Ask your doctor when you can expect to have your test results. Where can you learn more? Go to http://nurys-nikos.info/. Enter Y504 in the search box to learn more about \"Prostate Biopsy and Ultrasound: About This Test.\" Current as of: March 28, 2018 Content Version: 11.8 © 6217-0579 Healthwise, Incorporated. Care instructions adapted under license by [employer] (which disclaims liability or warranty for this information).  If you have questions about a medical condition or this instruction, always ask your healthcare professional. [facility] any warranty or liability for your use of this information. Introducing hospitals & HEALTH SERVICES! Marilou Mckeon introduces EMBI patient portal. Now you can access parts of your medical record, email your doctor's office, and request medication refills online. 1. In your internet browser, go to [email or web address] 2. Click on the First Time User? Click Here link in the Sign In box. You will see the New Member Sign Up page. 3. Enter your EMBI Access Code exactly as it appears below. You will not need to use this code after youve completed the sign-up process. If you do not sign up before the expiration date, you must request a new code. · EMBI Access Code: [email or web address] Expires: 11/25/2018 11:41 AM 
 
4. Enter the last four digits of your Social Security Number (xxxx) and Date of Birth (mm/dd/yyyy) as indicated and click Submit. You will be taken to the next sign-up page. 5. Create a EMBI ID. This will be your EMBI login ID and cannot be changed, so think of one that is secure and easy to remember. 6. Create a EMBI password. You can change your password at any time. 7. Enter your Password Reset Question and Answer. This can be used at a later time if you forget your password. 8. Enter your e-mail address. You will receive e-mail notification when new information is available in [address] 9. Click Sign Up. You can now view and download portions of your medical record. 10. Click the Download Summary menu link to download a portable copy of your medical information. If you have questions, please visit the Frequently Asked Questions section of the EMBI website. Remember, EMBI is NOT to be used for urgent needs. For medical emergencies, dial 911. Now available from your iPhone and Android! Please provide this summary of care documentation to your next provider. Your primary care clinician is listed as Girish Brandon.  If you have any questions after today's visit, please call [phone].

## 2018-10-10 NOTE — PATIENT INSTRUCTIONS
Prostate Biopsy and Ultrasound: About This Test  What is it? A prostate biopsy is a type of test. Your doctor takes small tissue samples from your prostate gland. Then another doctor looks at the tissue under a microscope to see if there are cancer cells. This test is done by a doctor who specializes in men's genital and urinary problems (urologist). It can be done in your doctor's office, a day surgery clinic, or a hospital operating room. To get the tissue samples from the prostate, the doctor inserts a thin needle through the rectum, the urethra, or the area between the anus and scrotum (perineum). The most common method is through the rectum. Your doctor may use ultrasound to help guide the needle. Why is this test done? You may need a prostate biopsy if your doctor found something of concern during a digital rectal exam. Or you may need it if a blood test showed a high level of prostate-specific antigen (PSA). A biopsy can help find out if you have prostate cancer. How can you prepare for this test?  Before you have a prostate biopsy, tell your doctor if:  · You are taking any medicines or using any herbal supplements. · You are allergic to any medicines. · You have had bleeding problems, or you take aspirin or some other blood thinner. What happens before the test?  · You may need to have an enema before the test.  · You will need to take off all or most of your clothes. You will be given a cloth or paper gown to use during the test.  · You may be given a sedative through a vein (IV) in your arm. The sedative will help you relax and stay still. What happens during the test?  Through the rectum  · You may be asked to kneel, lie on your side, or lie on your back. · Your doctor may inject an anesthetic around the prostate to numb the area before the sample is taken. · Ultrasound is often used to guide the needle to the correct spot. · Your doctor may choose to use a needle guide for the biopsy. He or she will attach the guide to a finger. Your doctor will insert the finger into your rectum. · The needle will enter the prostate and take 6 to 12 samples. Through the urethra  · You will lie on your back. Your feet will rest in stirrups. · You will get anesthesia. The anesthesia may make you sleep. Or it may just numb the area being worked on.  · Your doctor will insert a lighted scope (cystoscope) into your urethra. The scope allows your doctor to look directly at the prostate. Your doctor will pass a cutting loop through the scope to remove samples of prostate tissue. Through the perineum  · You will lie on an exam table either on your side or on your back with your knees bent. You will get anesthesia. The anesthesia may make you sleep. Or it may just numb the area being worked on.  · Your doctor will make a small cut in your perineum. Then he or she will insert a finger into the rectum to hold the prostate. · Your doctor will then insert the needle through the cut and into the prostate. · The needle collects samples of tissue and is then pulled out. What else should you know about this test?  · A prostate biopsy has a slight risk of causing problems such as infection or bleeding. · If the biopsy went through your rectum, you may have a small amount of bleeding from your rectum for 2 to 3 days after the biopsy. · You may have a little pain in your pelvic area. You may also have a little blood in your urine for 1 to 5 days. · You may have some blood in your semen for a week or longer. How long will the test take? · This test will take about 15 to 45 minutes. What happens after the test?  Your doctor will tell you what to expect and watch for after your test. In general:  · If you have anesthesia that makes you sleep, you will be in a recovery room for a few hours. You will need someone to drive you home. You may feel tired for the rest of the day.   · You will probably be able to go home right away.  · If your doctor had you stop taking any medicine for the biopsy, ask him or her when you can start taking it again. If you were given antibiotics, take them as directed. · Do not do heavy work or exercise for 4 hours after the test.  · Your doctor will tell you how long it may take to get your results back. Follow-up care is a key part of your treatment and safety. Be sure to make and go to all appointments, and call your doctor if you are having problems. It's also a good idea to keep a list of the medicines you take. Ask your doctor when you can expect to have your test results. Where can you learn more? Go to http://nurys-nikos.info/. Enter Y504 in the search box to learn more about \"Prostate Biopsy and Ultrasound: About This Test.\"  Current as of: March 28, 2018  Content Version: 11.8  © 9497-4642 Healthwise, Incorporated. Care instructions adapted under license by [employer] (which disclaims liability or warranty for this information). If you have questions about a medical condition or this instruction, always ask your healthcare professional. [address] any warranty or liability for your use of this information.

## 2018-10-10 NOTE — PROGRESS NOTES
Anne Garcia 78 y.o. male     Mr. Garcia seen today for prostate biopsy assessing elevated PSA    elevated PSA found on routine testing  Patient notes recurrent pain and swelling in the right upper scrotum following initial improvement with drainage from I&D on 27 August 2018  CNS abscess discharge positive for staph aureus sensitive to Septra resistant to Cipro/allergic to sulfa patient complains of intermittent  Palpable mass in right inguinal region in addition to tender scrotal wall abscess in same region     PSA 27.3 on 27 August 2018 transperineal prostate biopsy on     10 October 2018 state volume 70.0 cc PSA density 0.39     Review of Systems:   CNS: No seizure syncope headaches dizziness or visual changes  Respiratory: No wheezing shortness of breath chest pain or coughing  Cardiovascular: No palpitations no angina  Intestinal: No dyspepsia diarrhea or constipation  Urinary:  mild obstructive and irritative voiding symptoms   Skeletal: No bone or joint pain  Endocrine: No diabetes or thyroid disease  Other:    Allergies: Allergies   Allergen Reactions    Sulfa (Sulfonamide Antibiotics) Other (comments)     As a child told by mother. Does not know reaction. Urinalysis: Negative dipstick/nitrite negative/heme-negativ    PROSTATE BIOPSY REPORT    Patient ID confirmed prior to procedure: Yes    Indications for prostate biopsy: PSA 27.3    Pre biopsy preparations:         ___x_____ Cipro 500 mg PO       ________ Levaquin 500 mg PO                                                   ________ Ancef 1000mg PO      _________Valium 10 mg PO      TRANSRECTAL ULTRASOUND IMAGING OF THE PROSTATE:    Multiple transverse and longitudinal images of the prostate, seminal vesicles, and bladder revealed: Homogeneous echo patterns and outlines bilaterally.   No stone densities cysts or hypodense segments evident on either side    Prostate Dimensions: 5 cm x 5 cm x 5.6 cm    Prostate Volume = 0.5(H)(L)(W) =    70.0 cc    PSA Density: (PSA)/Prostate Volume = 27.3/70.0 =  PSAD  =   0.39                    Biopsy Procedure    Local anesthesia of the perineum was obtained by injecting 1% Lidocaine into the skin of the perineum 1 cm anterior to the anal verge and then into the deep perineum in the direction of the prostate. Guided by a gloved finger in the rectum, a 14 gauge needle was then passed into the anesthetized region and advanced in the direction of the prostate. An 18 gauge spinal needle was then passed through the 14 gauge needle and 10 cc's of 1% Lidocaine was infiltrated periprostatically with needle placement guided by ultrasound imaging obtained by transrectal positioning of the ultrasound transducer. An 18 gauge Biopty needle was then passed through the 14 gauge conduit needle multiple times obtaining  6   cores of tissue from the left lobe and   6   cores of tissue from the right lobe, biopsy needle placement guided by real time transrectal ultrasound imaging of the needle along its path through the perineum into the prostate. The conduit needle was then removed and pressure was applied to the perineum for one minute. The patient was then given oral and written instructions regarding post biopsy precautions as well as instructions regarding activity, medication, and follow up. Comments: Procedure was uncomplicated and well-tolerated    EBL: Minimal  Specimen removed: 6 cores of tissue from the left lobe/6 cores of tissue from the right lobe sampling the apex mid and base of both sides          Impression: Elevated PSA stable status post transperineal prostate biopsy        Plan: Postbiopsy precautions           Cipro 500 mg twice daily times 3 days    RTC 1 week for prostate biopsy pathology report        Orion Randall MD  -electronically signed-    PLEASE NOTE:  This document has been produced using voice recognition software. Unrecognized errors in transcription may be present.

## 2018-10-17 ENCOUNTER — OFFICE VISIT (OUTPATIENT)
Dept: UROLOGY | Age: 78
End: 2018-10-17

## 2018-10-17 VITALS
HEART RATE: 83 BPM | HEIGHT: 69 IN | DIASTOLIC BLOOD PRESSURE: 65 MMHG | OXYGEN SATURATION: 95 % | BODY MASS INDEX: 24.73 KG/M2 | SYSTOLIC BLOOD PRESSURE: 125 MMHG | WEIGHT: 167 LBS

## 2018-10-17 DIAGNOSIS — C61 CA PROSTATE, ADENOCA (HCC): Primary | ICD-10-CM

## 2018-10-17 RX ORDER — BISMUTH SUBSALICYLATE 262 MG
1 TABLET,CHEWABLE ORAL DAILY
COMMUNITY
End: 2019-10-08

## 2018-10-17 NOTE — PATIENT INSTRUCTIONS
Prostate Cancer: Care Instructions  Your Care Instructions    The prostate gland is a small, walnut-shaped organ that lies just below a man's bladder. It surrounds the urethra, the tube that carries urine from the bladder out of the body through the penis. Prostate cancer is the abnormal growth of cells in the prostate gland. Prostate cancer cells can spread within the prostate, to nearby lymph nodes and other tissues, and to other parts of the body. When the cancer hasn't spread outside the prostate, it is called localized prostate cancer. With localized prostate cancer, your options depend on how likely it is that your cancer will grow. Tests will show if your cancer is likely to grow. · Low-risk cancer isn't likely to grow right away. If your cancer is low-risk, you can choose active surveillance. This means your cancer will be watched closely by your doctor with regular checkups and tests to see if the cancer grows. This choice allows you to delay having surgery or radiation, often for many years. If the cancer grows very slowly, you may never need treatment. · Medium-risk cancer is more likely to grow. Some men with this type of cancer may be able to choose active surveillance. Others may need to choose surgery or radiation. · High-risk cancer is most likely to grow. If you have high-risk cancer, you will likely need to choose surgery or radiation. If your cancer has already spread outside the prostate or to other parts of the body, then you may have other treatments, like chemotherapy or hormone therapy. If you are over age [de-identified] or have other serious health problems, like heart disease, you may choose not to have treatments to cure your cancer. Instead, you can just have treatments to manage your symptoms. This is called watchful waiting. Finding out that you have cancer is scary. You may feel many emotions and may need some help coping. Seek out family, friends, and counselors for support.  You also can do things at home to make yourself feel better while you go through treatment. Call the [facility] (4-301.593.2940) or visit its website at [address] [employer] for more information. Follow-up care is a key part of your treatment and safety. Be sure to make and go to all appointments, and call your doctor if you are having problems. It's also a good idea to know your test results and keep a list of the medicines you take. How can you care for yourself at home? · Your doctor will talk to you about your treatment options. You may need to learn more about each of them before you can decide which treatment is best for you. · Take your medicines exactly as prescribed. Call your doctor if you think you are having a problem with your medicine. You will get more details on the specific medicines your doctor prescribes. · Eat healthy food. If you do not feel like eating, try to eat food that has protein and extra calories to keep up your strength and prevent weight loss. Drink liquid meal replacements for extra calories and protein. Try to eat your main meal early. · Take steps to control your stress and workload. Learn relaxation techniques. ? Share your feelings. Stress and tension affect our emotions. By expressing your feelings to others, you may be able to understand and cope with them. ? Consider joining a support group. Talking about a problem with your spouse, a good friend, or other people with similar problems is a good way to reduce tension and stress. ? Express yourself through art. Try writing, crafts, dance, or art to relieve stress. Some dance, writing, or art groups may be available just for people who have cancer. ? Be kind to your body and mind. Getting enough sleep, eating a healthy diet, and taking time to do things you enjoy can contribute to an overall feeling of balance in your life and can help reduce stress. ? Get help if you need it.  Discuss your concerns with your doctor or counselor. · Get some physical activity every day, but do not get too tired. Keep doing the hobbies you enjoy as your energy allows. · If you are vomiting or have diarrhea:  ? Drink plenty of fluids (enough so that your urine is light yellow or clear like water) to prevent dehydration. Choose water and other caffeine-free clear liquids. If you have kidney, heart, or liver disease and have to limit fluids, talk with your doctor before you increase the amount of fluids you drink. ? When you are able to eat, try clear soups, mild foods, and liquids until all symptoms are gone for 12 to 48 hours. Jell-O, dry toast, crackers, and cooked cereal are also good choices. · If you have not already done so, prepare a list of advance directives. Advance directives are instructions to your doctor and family members about what kind of care you want if you become unable to speak or express yourself. When should you call for help? Call 911 anytime you think you may need emergency care. For example, call if:    · You passed out (lost consciousness).    Call your doctor now or seek immediate medical care if:    · You have new or worse pain.     · You have new symptoms, such as a cough, belly pain, vomiting, diarrhea, or a rash.     · You have symptoms of a urinary tract infection. For example:  ? You have blood or pus in your urine. ? You have pain in your back just below your rib cage. This is called flank pain. ? You have a fever, chills, or body aches. ? It hurts to urinate. ? You have groin or belly pain.    Watch closely for changes in your health, and be sure to contact your doctor if:    · You have swollen glands in your armpits, groin, or neck.     · You have trouble controlling your urine.     · You do not get better as expected. Where can you learn more? Go to http://nurys-nikos.info/. Enter V141 in the search box to learn more about \"Prostate Cancer: Care Instructions. \"  Current as of: March 28, 2018  Content Version: 11.8  © 0322-3205 Healthwise, Incorporated. Care instructions adapted under license by [employer] (which disclaims liability or warranty for this information). If you have questions about a medical condition or this instruction, always ask your healthcare professional. [place] [address] any warranty or liability for your use of this information.

## 2018-10-19 NOTE — PROGRESS NOTES
Chente Rio Garcia 78 y.o. male     Mr. Garcia seen today for follow-up pathology report prostate biopsy on 10 October 2018    elevated PSA found on routine testing  Patient notes recurrent pain and swelling in the right upper scrotum following initial improvement with drainage from I&D on 27 August 2018  CNS abscess discharge positive for staph aureus sensitive to Septra resistant to Cipro/allergic to sulfa patient complains of intermittent  Palpable mass in right inguinal region in addition to tender scrotal wall abscess in same region     PSA 27.3 on 27 August 2018 transperineal prostate biopsy on 1`0 October 2018 volume 70.0 cc PSA density 0.39 [place] [address] histology ad     Review of Systems:   CNS: No seizure syncope headaches dizziness or visual changes  Respiratory: No wheezing shortness of breath chest pain or coughing  Cardiovascular: No palpitations no angina  Intestinal: No dyspepsia diarrhea or constipation  Urinary:  mild obstructive and irritative voiding symptoms   Skeletal: No bone or joint pain  Endocrine: No diabetes or thyroid disease  Other:      Allergies: Allergies   Allergen Reactions    Sulfa (Sulfonamide Antibiotics) Other (comments)     As a child told by mother. Does not know reaction. Medications:    Current Outpatient Medications   Medication Sig Dispense Refill    multivitamin (ONE A DAY) tablet Take 1 Tab by mouth daily.  lisinopril-hydroCHLOROthiazide (PRINZIDE, ZESTORETIC) 10-12.5 mg per tablet Take 1 Tab by mouth daily. 30 Tab 0    oxyCODONE IR (ROXICODONE) 5 mg immediate release tablet Take 1 Tab by mouth every four (4) hours as needed for Pain. Max Daily Amount: 30 mg.  Indications: Pain 18 Tab 0       Past Medical History:   Diagnosis Date    Hypertension       Past Surgical History:   Procedure Laterality Date    HX TONSILLECTOMY      HX VASECTOMY       Social History     Socioeconomic History    Marital status: SINGLE     Spouse name: Not on file    Number of children: Not on file    Years of education: Not on file    Highest education level: Not on file   Social Needs    Financial resource strain: Not on file    Food insecurity - worry: Not on file    Food insecurity - inability: Not on file    Transportation needs - medical: Not on file    Transportation needs - non-medical: Not on file   Occupational History    Not on file   Tobacco Use    Smoking status: Former Smoker    Smokeless tobacco: Never Used   Substance and Sexual Activity    Alcohol use: Yes     Comment: occasionally    Drug use: No    Sexual activity: No   Other Topics Concern    Not on file   Social History Narrative    Not on file      Family History   Problem Relation Age of Onset    Cancer Sister         breast            Physical Examination: Well - nourished mature male in no distress    Prostate Biopsy Pathology Report: MS 18-5.24 10 October 2018 [place] adenocarcinoma right mid lobe                                                                                                           [place] adenocarcinoma right base      Impression: [place] adenocarcinoma the prostate/PSA 27.3    Plan: Bone scan and CT scan imaging of the abdomen and pelvis metastatic assessment  1. Described and discussed definitive treatment options - prostatectomy by open or laparoscopic technique, radioactive seed implantation, radiation by external beam, proton beam or Cryotherapy. 2. Pros/Cons of definative treatment for prostate cancer described  3. Refer to the prostate cancer support groups \"US TOO\" and \"Man to Man\"  4. Prostate cancer book provided   5. Described and discussed referral of patient to Radiation Oncology for evaluation and consideration as a candidate for radiation therapy.   6 described discussed prospect of androgen ablation therapy, fiducial marker placement, and spacer gel implantation as adjuncts to radiation therapy     RTC 3 weeks    More than 1/2 of this 25 minute visit was spent in counselling and coordination of care, as described above. Vickie Osorio MD  -electronically signed-    PLEASE NOTE:  This document has been produced using voice recognition software. Unrecognized errors in transcription may be present.

## 2018-10-22 ENCOUNTER — HOSPITAL ENCOUNTER (OUTPATIENT)
Dept: NUCLEAR MEDICINE | Age: 78
Discharge: HOME | End: 2018-10-22
Attending: UROLOGY
Payer: MEDICARE

## 2018-10-22 ENCOUNTER — HOSPITAL ENCOUNTER (OUTPATIENT)
Dept: CT IMAGING | Age: 78
Discharge: HOME | End: 2018-10-22
Attending: UROLOGY
Payer: MEDICARE

## 2018-10-22 DIAGNOSIS — C61 CA PROSTATE, ADENOCA (HCC): ICD-10-CM

## 2018-10-22 LAB — CREAT UR-MCNC: 1 MG/DL (ref 0.6–1.3)

## 2018-10-22 PROCEDURE — 78306 BONE IMAGING WHOLE BODY: CPT

## 2018-10-22 PROCEDURE — 74177 CT ABD & PELVIS W/CONTRAST: CPT

## 2018-10-22 PROCEDURE — 74011636320 HC RX REV CODE- 636/320: Performed by: UROLOGY

## 2018-10-22 PROCEDURE — 82565 ASSAY OF CREATININE: CPT

## 2018-10-22 RX ADMIN — IOPAMIDOL 100 ML: 612 INJECTION, SOLUTION INTRAVENOUS at 09:36

## 2018-11-05 ENCOUNTER — OFFICE VISIT (OUTPATIENT)
Dept: UROLOGY | Age: 78
End: 2018-11-05

## 2018-11-05 VITALS
DIASTOLIC BLOOD PRESSURE: 70 MMHG | HEIGHT: 69 IN | SYSTOLIC BLOOD PRESSURE: 120 MMHG | BODY MASS INDEX: 24.73 KG/M2 | OXYGEN SATURATION: 97 % | HEART RATE: 79 BPM | WEIGHT: 167 LBS

## 2018-11-05 DIAGNOSIS — R91.1 LUNG NODULE, SOLITARY: Primary | ICD-10-CM

## 2018-11-05 DIAGNOSIS — C61 PROSTATE CARCINOMA (HCC): ICD-10-CM

## 2018-11-05 NOTE — PROGRESS NOTES
Vivian Elans Jose 70 y.o. male     Mr. Garcia seen today for prostate cancer follow-up here today for results of metastatic assay CT scan imaging of the abdomen and pelvis as well as bone scan performed since last visit 2 weeks ago    Bone scan on 17 October 2020 18- for metastatic lesions-old fracture right ribs   CT scan imaging of the abdomen and pelvis negative for signs of bryson disease but positive for left lower lobe pleural pulmonary nodule warranting further CT scan imaging    elevated PSA found on routine testing  Patient notes recurrent pain and swelling in the right upper scrotum following initial improvement with drainage from I&D on 27 August 2018  C&S abscess discharge positive for staph aureus sensitive to Septra resistant to Cipro/allergic to sulfa patient complains of intermittent  Palpable mass in right inguinal region in addition to tender scrotal wall abscess in same region     PSA 27.3 on 27 August 2018 transperineal prostate biopsy on 1`0 October 2018 volume 70.0 cc PSA density 0.39 [place] [employer] [facility] histology ad     Review of Systems:   CNS: No seizure syncope headaches dizziness or visual changes  Respiratory: No wheezing shortness of breath chest pain or coughing  Cardiovascular: No palpitations no angina  Intestinal: No dyspepsia diarrhea or constipation  Urinary:  mild obstructive and irritative voiding symptoms   Skeletal: No bone or joint pain  Endocrine: No diabetes or thyroid disease  Other:            allergies: Allergies   Allergen Reactions    Sulfa (Sulfonamide Antibiotics) Other (comments)     As a child told by mother. Does not know reaction. Medications:    Current Outpatient Medications   Medication Sig Dispense Refill    multivitamin (ONE A DAY) tablet Take 1 Tab by mouth daily.  lisinopril-hydroCHLOROthiazide (PRINZIDE, ZESTORETIC) 10-12.5 mg per tablet Take 1 Tab by mouth daily.  30 Tab 0    oxyCODONE IR (ROXICODONE) 5 mg immediate release tablet Take 1 Tab by mouth every four (4) hours as needed for Pain. Max Daily Amount: 30 mg.  Indications: Pain 18 Tab 0       Past Medical History:   Diagnosis Date    Hypertension       Past Surgical History:   Procedure Laterality Date    HX TONSILLECTOMY      HX VASECTOMY       Social History     Socioeconomic History    Marital status: SINGLE     Spouse name: Not on file    Number of children: Not on file    Years of education: Not on file    Highest education level: Not on file   Social Needs    Financial resource strain: Not on file    Food insecurity - worry: Not on file    Food insecurity - inability: Not on file    Transportation needs - medical: Not on file   [employer] needs - non-medical: Not on file   Occupational History    Not on file   Tobacco Use    Smoking status: Former Smoker    Smokeless tobacco: Never Used   Substance and Sexual Activity    Alcohol use: Yes     Comment: occasionally    Drug use: No    Sexual activity: No   Other Topics Concern    Not on file   Social History Narrative    Not on file      Family History   Problem Relation Age of Onset    Cancer Sister         breast        Physical Examination: Well-nourished elderly male in no apparent distress      Urinalysis: No specimen today    Impression: [place] [facility] prostate carcinoma PSA 27.3 with negative metastatic assessment                                Left posterior chest lesion incidental finding on abdominal CT        Plan: Referred to radiation oncology-Dr. Ibarra for evaluation and consideration is a candidate for           IM RT    Described / discussed adjunctive measures for IM RT including hormone ablation therapy, fiducial marker placement, and space oar gel implantation             CT imaging of the chest before and after contrast              Follow-up CT scan imaging of the chest with primary physician-Dr. Shah    RTC 3 weeks-initiation of androgen ablation therapy, schedule space oar gel and gold fiducial marker placement    More than 1/2 of this 25 minute visit was spent in counselling and coordination of care, as described above. Carroll Henley MD  -electronically signed-    PLEASE NOTE:  This document has been produced using voice recognition software. Unrecognized errors in transcription may be present.

## 2018-11-05 NOTE — PATIENT INSTRUCTIONS
Prostate Cancer: Care Instructions  Your Care Instructions    The prostate gland is a small, walnut-shaped organ that lies just below a man's bladder. It surrounds the urethra, the tube that carries urine from the bladder out of the body through the penis. Prostate cancer is the abnormal growth of cells in the prostate gland. Prostate cancer cells can spread within the prostate, to nearby lymph nodes and other tissues, and to other parts of the body. When the cancer hasn't spread outside the prostate, it is called localized prostate cancer. With localized prostate cancer, your options depend on how likely it is that your cancer will grow. Tests will show if your cancer is likely to grow. · Low-risk cancer isn't likely to grow right away. If your cancer is low-risk, you can choose active surveillance. This means your cancer will be watched closely by your doctor with regular checkups and tests to see if the cancer grows. This choice allows you to delay having surgery or radiation, often for many years. If the cancer grows very slowly, you may never need treatment. · Medium-risk cancer is more likely to grow. Some men with this type of cancer may be able to choose active surveillance. Others may need to choose surgery or radiation. · High-risk cancer is most likely to grow. If you have high-risk cancer, you will likely need to choose surgery or radiation. If your cancer has already spread outside the prostate or to other parts of the body, then you may have other treatments, like chemotherapy or hormone therapy. If you are over age [de-identified] or have other serious health problems, like heart disease, you may choose not to have treatments to cure your cancer. Instead, you can just have treatments to manage your symptoms. This is called watchful waiting. Finding out that you have cancer is scary. You may feel many emotions and may need some help coping. Seek out family, friends, and counselors for support.  You also can do things at home to make yourself feel better while you go through treatment. Call the [facility] (1-659.689.7379) or visit its website at [address] [employer] for more information. Follow-up care is a key part of your treatment and safety. Be sure to make and go to all appointments, and call your doctor if you are having problems. It's also a good idea to know your test results and keep a list of the medicines you take. How can you care for yourself at home? · Your doctor will talk to you about your treatment options. You may need to learn more about each of them before you can decide which treatment is best for you. · Take your medicines exactly as prescribed. Call your doctor if you think you are having a problem with your medicine. You will get more details on the specific medicines your doctor prescribes. · Eat healthy food. If you do not feel like eating, try to eat food that has protein and extra calories to keep up your strength and prevent weight loss. Drink liquid meal replacements for extra calories and protein. Try to eat your main meal early. · Take steps to control your stress and workload. Learn relaxation techniques. ? Share your feelings. Stress and tension affect our emotions. By expressing your feelings to others, you may be able to understand and cope with them. ? Consider joining a support group. Talking about a problem with your spouse, a good friend, or other people with similar problems is a good way to reduce tension and stress. ? Express yourself through art. Try writing, crafts, dance, or art to relieve stress. Some dance, writing, or art groups may be available just for people who have cancer. ? Be kind to your body and mind. Getting enough sleep, eating a healthy diet, and taking time to do things you enjoy can contribute to an overall feeling of balance in your life and can help reduce stress. ? Get help if you need it.  Discuss your concerns with your doctor or counselor. · Get some physical activity every day, but do not get too tired. Keep doing the hobbies you enjoy as your energy allows. · If you are vomiting or have diarrhea:  ? Drink plenty of fluids (enough so that your urine is light yellow or clear like water) to prevent dehydration. Choose water and other caffeine-free clear liquids. If you have kidney, heart, or liver disease and have to limit fluids, talk with your doctor before you increase the amount of fluids you drink. ? When you are able to eat, try clear soups, mild foods, and liquids until all symptoms are gone for 12 to 48 hours. Jell-O, dry toast, crackers, and cooked cereal are also good choices. · If you have not already done so, prepare a list of advance directives. Advance directives are instructions to your doctor and family members about what kind of care you want if you become unable to speak or express yourself. When should you call for help? Call 911 anytime you think you may need emergency care. For example, call if:    · You passed out (lost consciousness).    Call your doctor now or seek immediate medical care if:    · You have new or worse pain.     · You have new symptoms, such as a cough, belly pain, vomiting, diarrhea, or a rash.     · You have symptoms of a urinary tract infection. For example:  ? You have blood or pus in your urine. ? You have pain in your back just below your rib cage. This is called flank pain. ? You have a fever, chills, or body aches. ? It hurts to urinate. ? You have groin or belly pain.    Watch closely for changes in your health, and be sure to contact your doctor if:    · You have swollen glands in your armpits, groin, or neck.     · You have trouble controlling your urine.     · You do not get better as expected. Where can you learn more? Go to http://nurys-nikos.info/. Enter V141 in the search box to learn more about \"Prostate Cancer: Care Instructions. \"  Current as of: March 28, 2018  Content Version: 11.8  © 2322-1017 Healthwise, Incorporated. Care instructions adapted under license by [employer] (which disclaims liability or warranty for this information). If you have questions about a medical condition or this instruction, always ask your healthcare professional. [place] [address] any warranty or liability for your use of this information.

## 2018-11-12 ENCOUNTER — HOSPITAL ENCOUNTER (OUTPATIENT)
Dept: CT IMAGING | Age: 78
Discharge: HOME | End: 2018-11-12
Attending: UROLOGY
Payer: MEDICARE

## 2018-11-12 DIAGNOSIS — R91.1 LUNG NODULE, SOLITARY: ICD-10-CM

## 2018-11-12 LAB — CREAT UR-MCNC: 0.9 MG/DL (ref 0.6–1.3)

## 2018-11-12 PROCEDURE — 74011636320 HC RX REV CODE- 636/320: Performed by: UROLOGY

## 2018-11-12 PROCEDURE — 71260 CT THORAX DX C+: CPT

## 2018-11-12 PROCEDURE — 82565 ASSAY OF CREATININE: CPT

## 2018-11-12 RX ADMIN — IOPAMIDOL 75 ML: 612 INJECTION, SOLUTION INTRAVENOUS at 11:02

## 2018-12-05 ENCOUNTER — HOSPITAL ENCOUNTER (OUTPATIENT)
Dept: RADIATION THERAPY | Age: 78
Discharge: HOME | End: 2018-12-05
Payer: COMMERCIAL

## 2018-12-05 PROCEDURE — 99211 OFF/OP EST MAY X REQ PHY/QHP: CPT

## 2018-12-17 ENCOUNTER — OFFICE VISIT (OUTPATIENT)
Dept: UROLOGY | Age: 78
End: 2018-12-17

## 2018-12-17 VITALS
HEART RATE: 84 BPM | SYSTOLIC BLOOD PRESSURE: 143 MMHG | OXYGEN SATURATION: 97 % | DIASTOLIC BLOOD PRESSURE: 74 MMHG | WEIGHT: 174 LBS | BODY MASS INDEX: 25.77 KG/M2 | HEIGHT: 69 IN

## 2018-12-17 DIAGNOSIS — C61 PROSTATE CANCER (HCC): Primary | ICD-10-CM

## 2018-12-17 DIAGNOSIS — Z01.818 PRE-OP TESTING: ICD-10-CM

## 2018-12-17 RX ORDER — BICALUTAMIDE 50 MG/1
50 TABLET, FILM COATED ORAL DAILY
Qty: 90 TAB | Refills: 3 | Status: SHIPPED | OUTPATIENT
Start: 2018-12-17 | End: 2019-12-13

## 2018-12-17 NOTE — LETTER
12/18/2018 9:52 AM 
 
 
[facility], [address] Mr. Alejandro Catalan [facility] [address] We have Mr. Garcia scheduled for placement of Space Oar Gel and Fiducial Gold Markers at [facility] this Friday 12/21/18 in anticipation for radiation therapy for prostate cancer. We need medical clearance on Mr. Garcia. I have included a copy of office note from 12/17/18 along with the chest CT scan. Please fax a note to our office at [phone] letting us know the status of his clearance following his appointment with you on 12/19/2018. Thank you.   
 
 
Sincerely, 
 
 
Tammie Foley MD

## 2018-12-17 NOTE — PATIENT INSTRUCTIONS
Prostate Cancer: Care Instructions  Your Care Instructions    The prostate gland is a small, walnut-shaped organ that lies just below a man's bladder. It surrounds the urethra, the tube that carries urine from the bladder out of the body through the penis. Prostate cancer is the abnormal growth of cells in the prostate gland. Prostate cancer cells can spread within the prostate, to nearby lymph nodes and other tissues, and to other parts of the body. When the cancer hasn't spread outside the prostate, it is called localized prostate cancer. With localized prostate cancer, your options depend on how likely it is that your cancer will grow. Tests will show if your cancer is likely to grow. · Low-risk cancer isn't likely to grow right away. If your cancer is low-risk, you can choose active surveillance. This means your cancer will be watched closely by your doctor with regular checkups and tests to see if the cancer grows. This choice allows you to delay having surgery or radiation, often for many years. If the cancer grows very slowly, you may never need treatment. · Medium-risk cancer is more likely to grow. Some men with this type of cancer may be able to choose active surveillance. Others may need to choose surgery or radiation. · High-risk cancer is most likely to grow. If you have high-risk cancer, you will likely need to choose surgery or radiation. If your cancer has already spread outside the prostate or to other parts of the body, then you may have other treatments, like chemotherapy or hormone therapy. If you are over age [de-identified] or have other serious health problems, like heart disease, you may choose not to have treatments to cure your cancer. Instead, you can just have treatments to manage your symptoms. This is called watchful waiting. Finding out that you have cancer is scary. You may feel many emotions and may need some help coping. Seek out family, friends, and counselors for support.  You also can do things at home to make yourself feel better while you go through treatment. Call the [facility] (9-254.360.9709) or visit its website at [address] for more information. Follow-up care is a key part of your treatment and safety. Be sure to make and go to all appointments, and call your doctor if you are having problems. It's also a good idea to know your test results and keep a list of the medicines you take. How can you care for yourself at home? · Your doctor will talk to you about your treatment options. You may need to learn more about each of them before you can decide which treatment is best for you. · Take your medicines exactly as prescribed. Call your doctor if you think you are having a problem with your medicine. You will get more details on the specific medicines your doctor prescribes. · Eat healthy food. If you do not feel like eating, try to eat food that has protein and extra calories to keep up your strength and prevent weight loss. Drink liquid meal replacements for extra calories and protein. Try to eat your main meal early. · Take steps to control your stress and workload. Learn relaxation techniques. ? Share your feelings. Stress and tension affect our emotions. By expressing your feelings to others, you may be able to understand and cope with them. ? Consider joining a support group. Talking about a problem with your spouse, a good friend, or other people with similar problems is a good way to reduce tension and stress. ? Express yourself through art. Try writing, crafts, dance, or art to relieve stress. Some dance, writing, or art groups may be available just for people who have cancer. ? Be kind to your body and mind. Getting enough sleep, eating a healthy diet, and taking time to do things you enjoy can contribute to an overall feeling of balance in your life and can help reduce stress. ? Get help if you need it.  Discuss your concerns with your doctor or counselor. · Get some physical activity every day, but do not get too tired. Keep doing the hobbies you enjoy as your energy allows. · If you are vomiting or have diarrhea:  ? Drink plenty of fluids (enough so that your urine is light yellow or clear like water) to prevent dehydration. Choose water and other caffeine-free clear liquids. If you have kidney, heart, or liver disease and have to limit fluids, talk with your doctor before you increase the amount of fluids you drink. ? When you are able to eat, try clear soups, mild foods, and liquids until all symptoms are gone for 12 to 48 hours. Jell-O, dry toast, crackers, and cooked cereal are also good choices. · If you have not already done so, prepare a list of advance directives. Advance directives are instructions to your doctor and family members about what kind of care you want if you become unable to speak or express yourself. When should you call for help? Call 911 anytime you think you may need emergency care. For example, call if:    · You passed out (lost consciousness).    Call your doctor now or seek immediate medical care if:    · You have new or worse pain.     · You have new symptoms, such as a cough, belly pain, vomiting, diarrhea, or a rash.     · You have symptoms of a urinary tract infection. For example:  ? You have blood or pus in your urine. ? You have pain in your back just below your rib cage. This is called flank pain. ? You have a fever, chills, or body aches. ? It hurts to urinate. ? You have groin or belly pain.    Watch closely for changes in your health, and be sure to contact your doctor if:    · You have swollen glands in your armpits, groin, or neck.     · You have trouble controlling your urine.     · You do not get better as expected. Where can you learn more? Go to http://nurys-nikos.info/. Enter V141 in the search box to learn more about \"Prostate Cancer: Care Instructions. \"  Current as of: March 28, 2018  Content Version: 11.8  © 7737-5874 [employer]. Care instructions adapted under license by [employer] (which disclaims liability or warranty for this information). If you have questions about a medical condition or this instruction, always ask your healthcare professional. [place] [address] any warranty or liability for your use of this information.

## 2018-12-18 ENCOUNTER — HOSPITAL ENCOUNTER (OUTPATIENT)
Dept: PREADMISSION TESTING | Age: 78
Discharge: HOME | End: 2018-12-18
Payer: MEDICARE

## 2018-12-18 DIAGNOSIS — C61 PROSTATE CANCER (HCC): ICD-10-CM

## 2018-12-18 DIAGNOSIS — Z01.818 PRE-OP TESTING: ICD-10-CM

## 2018-12-18 LAB
ANION GAP SERPL CALC-SCNC: 6 MMOL/L (ref 3–18)
ATRIAL RATE: 81 BPM
BUN SERPL-MCNC: 21 MG/DL (ref 7–18)
BUN/CREAT SERPL: 19 (ref 12–20)
CALCIUM SERPL-MCNC: 9.3 MG/DL (ref 8.5–10.1)
CALCULATED P AXIS, ECG09: 39 DEGREES
CALCULATED R AXIS, ECG10: 46 DEGREES
CALCULATED T AXIS, ECG11: 20 DEGREES
CHLORIDE SERPL-SCNC: 101 MMOL/L (ref 100–108)
CO2 SERPL-SCNC: 28 MMOL/L (ref 21–32)
CREAT SERPL-MCNC: 1.13 MG/DL (ref 0.6–1.3)
DIAGNOSIS, 93000: NORMAL
ERYTHROCYTE [DISTWIDTH] IN BLOOD BY AUTOMATED COUNT: 14.1 % (ref 11.6–14.5)
GLUCOSE SERPL-MCNC: 100 MG/DL (ref 74–99)
HCT VFR BLD AUTO: 38.5 % (ref 36–48)
HGB BLD-MCNC: 13.2 G/DL (ref 13–16)
MCH RBC QN AUTO: 29.6 PG (ref 24–34)
MCHC RBC AUTO-ENTMCNC: 34.3 G/DL (ref 31–37)
MCV RBC AUTO: 86.3 FL (ref 74–97)
P-R INTERVAL, ECG05: 170 MS
PLATELET # BLD AUTO: 303 K/UL (ref 135–420)
PMV BLD AUTO: 9.8 FL (ref 9.2–11.8)
POTASSIUM SERPL-SCNC: 4.7 MMOL/L (ref 3.5–5.5)
Q-T INTERVAL, ECG07: 396 MS
QRS DURATION, ECG06: 92 MS
QTC CALCULATION (BEZET), ECG08: 460 MS
RBC # BLD AUTO: 4.46 M/UL (ref 4.7–5.5)
SODIUM SERPL-SCNC: 135 MMOL/L (ref 136–145)
VENTRICULAR RATE, ECG03: 81 BPM
WBC # BLD AUTO: 9 K/UL (ref 4.6–13.2)

## 2018-12-18 PROCEDURE — 93005 ELECTROCARDIOGRAM TRACING: CPT

## 2018-12-18 PROCEDURE — 85027 COMPLETE CBC AUTOMATED: CPT

## 2018-12-18 PROCEDURE — 36415 COLL VENOUS BLD VENIPUNCTURE: CPT

## 2018-12-18 PROCEDURE — 80048 BASIC METABOLIC PNL TOTAL CA: CPT

## 2018-12-18 RX ORDER — SODIUM CHLORIDE 9 MG/ML
100 INJECTION, SOLUTION INTRAVENOUS CONTINUOUS
Status: CANCELLED | OUTPATIENT
Start: 2018-12-18

## 2018-12-18 RX ORDER — IBUPROFEN 200 MG
TABLET ORAL
COMMUNITY
End: 2019-10-08

## 2018-12-18 NOTE — PROGRESS NOTES
Jairo Garcia 70 y.o. male     Mr. Garcia seen today for prostate carcinoma follow-up    Patient has been evaluated by radiation oncology-Dr. Ibarra-IM RT with adjunctive androgen ablation therapy recommended     Bone scan on 17 October 2020 18- for metastatic lesions-old fracture right ribs -images have been reviewed with the patient  CT scan imaging of the abdomen and pelvis negative for signs of bryson disease but positive for left lower lobe pleural pulmonary nodule warranting further CT scan imaging    CT scan imaging of the chest on 12 November 2018 shows posterior left pleural nodule   Lungs:   No acute infiltrates are evident. .   The pleural-based opacity at the left posterior chest is again noted measuring  1.7 x 2.2 cm, not significantly changed from October 18. This measures 1.8 x 3.3  cm 2017 February there is a second linear nodular opacity just above scarring in  the lingula is also stable. The bronchi appear unremarkable. elevated PSA found on routine testing    Patient notes recurrent pain and swelling in the right upper scrotum following initial improvement with drainage from I&D on 27 August 2018  C&S abscess discharge positive for staph aureus sensitive to Septra resistant to Cipro/allergic to sulfa patient complains of intermittent  Palpable mass in right inguinal region in addition to tender scrotal wall abscess in same region     PSA 27.3 on 27 August 2018 transperineal prostate biopsy on 1`0 October 2018 volume 70.0 cc PSA density 0.39 [place] 7 histology      Review of Systems:   CNS: No seizure syncope headaches dizziness or visual changes  Respiratory: No wheezing shortness of breath chest pain or coughing  Cardiovascular: No palpitations no angina  Intestinal: No dyspepsia diarrhea or constipation  Urinary:  mild obstructive and irritative voiding symptoms   Skeletal: No bone or joint pain  Endocrine: No diabetes or thyroid disease  Other:              Allergies:    Allergies Allergen Reactions    Sulfa (Sulfonamide Antibiotics) Other (comments)     As a child told by mother. Does not know reaction. Medications:    Current Outpatient Medications   Medication Sig Dispense Refill    bicalutamide (CASODEX) 50 mg tablet Take 1 Tab by mouth daily. 90 Tab 3    multivitamin (ONE A DAY) tablet Take 1 Tab by mouth daily.  lisinopril-hydroCHLOROthiazide (PRINZIDE, ZESTORETIC) 10-12.5 mg per tablet Take 1 Tab by mouth daily. 30 Tab 0    oxyCODONE IR (ROXICODONE) 5 mg immediate release tablet Take 1 Tab by mouth every four (4) hours as needed for Pain. Max Daily Amount: 30 mg.  Indications: Pain 18 Tab 0       Past Medical History:   Diagnosis Date    Hypertension       Past Surgical History:   Procedure Laterality Date    HX TONSILLECTOMY      HX VASECTOMY       Social History     Socioeconomic History    Marital status: SINGLE     Spouse name: Not on file    Number of children: Not on file    Years of education: Not on file    Highest education level: Not on file   Social Needs    Financial resource strain: Not on file    Food insecurity - worry: Not on file    Food insecurity - inability: Not on file    Transportation needs - medical: Not on file   [employer] needs - non-medical: Not on file   Occupational History    Not on file   Tobacco Use    Smoking status: Former Smoker    Smokeless tobacco: Never Used   Substance and Sexual Activity    Alcohol use: Yes     Comment: occasionally    Drug use: No    Sexual activity: No   Other Topics Concern    Not on file   Social History Narrative    Not on file      Family History   Problem Relation Age of Onset    Cancer Sister         breast        Physical Examination: Well-nourished mature male in no apparent distress  Neck: No masses nodes or bruits  lungs: Clear breath sounds bilaterally no wheezes rales or rhonchi  Heart: Regular sinus rhythm no murmurs gallops or rubs  Abdomen: Nontender no palpable masses no organomegaly no bruits   back: Percussion CVA tenderness on either side  Skin: Warm and dry no rash no lesions   neurologic: No motor or sensory deficits in arms or legs  Genitalia: Penis is normal testes are normal bilaterally prostate by SEVERIANO is normal in size shape and consistency   no rectal masses induration or tenderness    Urinalysis: Negative dipstick/nitrite negative/heme-negative    Impression: [place] 7 adenocarcinoma the prostate / PSA 27.3                        Patient has elected definitive treatment by IM RT-adjunctive hormone ablation therapy with Lupron/Casodex                                                 2.2 cm left posterior pleural nodule    Plan: Lupron 45 mg IM right buttock today            Casodex 50 mg daily    Refer to primary physician-Dr. Sonu Cherry for evaluation of left chest pleural nodule    Fiducial marker placement and gel implantation-we will do this on 20 December 2018 if cleared by Dr. Neha Mar    Counseling Note:    Fiducial marker placement and spacer gel implantation techniques have been described discussed with patient today who understands and accepts the risks of bleeding, infection, and urinary retention which sometimes complicate  these procedures        More than 1/2 of this 40 minute visit was spent in counselling and coordination of care, as described above. Rohit Mann MD  -electronically signed-    PLEASE NOTE:  This document has been produced using voice recognition software. Unrecognized errors in transcription may be present.

## 2018-12-20 ENCOUNTER — ANESTHESIA EVENT (OUTPATIENT)
Dept: SURGERY | Age: 78
End: 2018-12-20
Payer: MEDICARE

## 2018-12-21 ENCOUNTER — ANESTHESIA (OUTPATIENT)
Dept: SURGERY | Age: 78
End: 2018-12-21
Payer: MEDICARE

## 2018-12-21 ENCOUNTER — HOSPITAL ENCOUNTER (OUTPATIENT)
Age: 78
Setting detail: OUTPATIENT SURGERY
Discharge: HOME | End: 2018-12-21
Attending: UROLOGY | Admitting: UROLOGY
Payer: MEDICARE

## 2018-12-21 VITALS
RESPIRATION RATE: 14 BRPM | HEIGHT: 69 IN | DIASTOLIC BLOOD PRESSURE: 71 MMHG | WEIGHT: 173.25 LBS | TEMPERATURE: 97 F | HEART RATE: 71 BPM | BODY MASS INDEX: 25.66 KG/M2 | SYSTOLIC BLOOD PRESSURE: 119 MMHG | OXYGEN SATURATION: 95 %

## 2018-12-21 DIAGNOSIS — C61 CA PROSTATE, ADENOCA (HCC): Primary | ICD-10-CM

## 2018-12-21 PROCEDURE — 74011000250 HC RX REV CODE- 250

## 2018-12-21 PROCEDURE — 76060000032 HC ANESTHESIA 0.5 TO 1 HR: Performed by: UROLOGY

## 2018-12-21 PROCEDURE — 74011250636 HC RX REV CODE- 250/636

## 2018-12-21 PROCEDURE — 77030032490 HC SLV COMPR SCD KNE COVD -B: Performed by: UROLOGY

## 2018-12-21 PROCEDURE — 74011250636 HC RX REV CODE- 250/636: Performed by: NURSE ANESTHETIST, CERTIFIED REGISTERED

## 2018-12-21 PROCEDURE — 77030036874 HC SPCR RECTAL HYDRGEL SPACEOAR AGMX -I: Performed by: UROLOGY

## 2018-12-21 PROCEDURE — 76210000063 HC OR PH I REC FIRST 0.5 HR: Performed by: UROLOGY

## 2018-12-21 PROCEDURE — 77030020782 HC GWN BAIR PAWS FLX 3M -B: Performed by: UROLOGY

## 2018-12-21 PROCEDURE — 77030018836 HC SOL IRR NACL ICUM -A: Performed by: UROLOGY

## 2018-12-21 PROCEDURE — 76010000160 HC OR TIME 0.5 TO 1 HR INTENSV-TIER 1: Performed by: UROLOGY

## 2018-12-21 PROCEDURE — A4648 IMPLANTABLE TISSUE MARKER: HCPCS | Performed by: UROLOGY

## 2018-12-21 PROCEDURE — 74011000258 HC RX REV CODE- 258: Performed by: UROLOGY

## 2018-12-21 PROCEDURE — 76210000021 HC REC RM PH II 0.5 TO 1 HR: Performed by: UROLOGY

## 2018-12-21 PROCEDURE — 74011250637 HC RX REV CODE- 250/637: Performed by: NURSE ANESTHETIST, CERTIFIED REGISTERED

## 2018-12-21 DEVICE — SPACER RECTAL HYDRGEL 10ML -- SPACEOAR: Type: IMPLANTABLE DEVICE | Site: PROSTATE | Status: FUNCTIONAL

## 2018-12-21 DEVICE — KIT NDL 17GA L20CM MRK L3MM DIA1.2MM SFT TISS G PLCMNT: Type: IMPLANTABLE DEVICE | Site: PROSTATE | Status: FUNCTIONAL

## 2018-12-21 RX ORDER — SODIUM CHLORIDE, SODIUM LACTATE, POTASSIUM CHLORIDE, CALCIUM CHLORIDE 600; 310; 30; 20 MG/100ML; MG/100ML; MG/100ML; MG/100ML
75 INJECTION, SOLUTION INTRAVENOUS CONTINUOUS
Status: DISCONTINUED | OUTPATIENT
Start: 2018-12-21 | End: 2018-12-21 | Stop reason: HOSPADM

## 2018-12-21 RX ORDER — CIPROFLOXACIN 500 MG/1
500 TABLET ORAL 2 TIMES DAILY
Qty: 6 TAB | Refills: 0 | Status: SHIPPED | OUTPATIENT
Start: 2018-12-21 | End: 2018-12-24

## 2018-12-21 RX ORDER — SODIUM CHLORIDE 0.9 % (FLUSH) 0.9 %
5-10 SYRINGE (ML) INJECTION AS NEEDED
Status: DISCONTINUED | OUTPATIENT
Start: 2018-12-21 | End: 2018-12-21 | Stop reason: HOSPADM

## 2018-12-21 RX ORDER — FAMOTIDINE 20 MG/1
20 TABLET, FILM COATED ORAL ONCE
Status: COMPLETED | OUTPATIENT
Start: 2018-12-21 | End: 2018-12-21

## 2018-12-21 RX ORDER — EPHEDRINE SULFATE/0.9% NACL/PF 25 MG/5 ML
SYRINGE (ML) INTRAVENOUS AS NEEDED
Status: DISCONTINUED | OUTPATIENT
Start: 2018-12-21 | End: 2018-12-21 | Stop reason: HOSPADM

## 2018-12-21 RX ORDER — LIDOCAINE HYDROCHLORIDE 10 MG/ML
0.1 INJECTION, SOLUTION EPIDURAL; INFILTRATION; INTRACAUDAL; PERINEURAL AS NEEDED
Status: DISCONTINUED | OUTPATIENT
Start: 2018-12-21 | End: 2018-12-21 | Stop reason: HOSPADM

## 2018-12-21 RX ORDER — SODIUM CHLORIDE, SODIUM LACTATE, POTASSIUM CHLORIDE, CALCIUM CHLORIDE 600; 310; 30; 20 MG/100ML; MG/100ML; MG/100ML; MG/100ML
INJECTION, SOLUTION INTRAVENOUS
Status: DISCONTINUED | OUTPATIENT
Start: 2018-12-21 | End: 2018-12-21 | Stop reason: HOSPADM

## 2018-12-21 RX ORDER — SODIUM CHLORIDE 0.9 % (FLUSH) 0.9 %
5-10 SYRINGE (ML) INJECTION EVERY 8 HOURS
Status: DISCONTINUED | OUTPATIENT
Start: 2018-12-21 | End: 2018-12-21 | Stop reason: HOSPADM

## 2018-12-21 RX ORDER — CEFAZOLIN SODIUM 2 G/50ML
2 SOLUTION INTRAVENOUS ONCE
Status: DISCONTINUED | OUTPATIENT
Start: 2018-12-21 | End: 2018-12-21 | Stop reason: HOSPADM

## 2018-12-21 RX ORDER — SODIUM CHLORIDE 9 MG/ML
100 INJECTION, SOLUTION INTRAVENOUS CONTINUOUS
Status: DISCONTINUED | OUTPATIENT
Start: 2018-12-21 | End: 2018-12-21 | Stop reason: HOSPADM

## 2018-12-21 RX ORDER — FENTANYL CITRATE 50 UG/ML
25 INJECTION, SOLUTION INTRAMUSCULAR; INTRAVENOUS AS NEEDED
Status: DISCONTINUED | OUTPATIENT
Start: 2018-12-21 | End: 2018-12-21 | Stop reason: HOSPADM

## 2018-12-21 RX ORDER — LIDOCAINE HYDROCHLORIDE 20 MG/ML
INJECTION, SOLUTION EPIDURAL; INFILTRATION; INTRACAUDAL; PERINEURAL AS NEEDED
Status: DISCONTINUED | OUTPATIENT
Start: 2018-12-21 | End: 2018-12-21 | Stop reason: HOSPADM

## 2018-12-21 RX ORDER — DEXAMETHASONE SODIUM PHOSPHATE 4 MG/ML
INJECTION, SOLUTION INTRA-ARTICULAR; INTRALESIONAL; INTRAMUSCULAR; INTRAVENOUS; SOFT TISSUE AS NEEDED
Status: DISCONTINUED | OUTPATIENT
Start: 2018-12-21 | End: 2018-12-21 | Stop reason: HOSPADM

## 2018-12-21 RX ORDER — ONDANSETRON 2 MG/ML
4 INJECTION INTRAMUSCULAR; INTRAVENOUS
Status: DISCONTINUED | OUTPATIENT
Start: 2018-12-21 | End: 2018-12-21 | Stop reason: HOSPADM

## 2018-12-21 RX ORDER — MIDAZOLAM HYDROCHLORIDE 1 MG/ML
INJECTION, SOLUTION INTRAMUSCULAR; INTRAVENOUS AS NEEDED
Status: DISCONTINUED | OUTPATIENT
Start: 2018-12-21 | End: 2018-12-21 | Stop reason: HOSPADM

## 2018-12-21 RX ORDER — FENTANYL CITRATE 50 UG/ML
INJECTION, SOLUTION INTRAMUSCULAR; INTRAVENOUS AS NEEDED
Status: DISCONTINUED | OUTPATIENT
Start: 2018-12-21 | End: 2018-12-21 | Stop reason: HOSPADM

## 2018-12-21 RX ORDER — CEFAZOLIN SODIUM IN 0.9 % NACL 2 G/100 ML
PLASTIC BAG, INJECTION (ML) INTRAVENOUS AS NEEDED
Status: DISCONTINUED | OUTPATIENT
Start: 2018-12-21 | End: 2018-12-21 | Stop reason: HOSPADM

## 2018-12-21 RX ORDER — DOCUSATE SODIUM 100 MG/1
100 CAPSULE, LIQUID FILLED ORAL 2 TIMES DAILY
Qty: 10 CAP | Refills: 0 | Status: SHIPPED | OUTPATIENT
Start: 2018-12-21 | End: 2018-12-26

## 2018-12-21 RX ORDER — PROPOFOL 10 MG/ML
INJECTION, EMULSION INTRAVENOUS AS NEEDED
Status: DISCONTINUED | OUTPATIENT
Start: 2018-12-21 | End: 2018-12-21 | Stop reason: HOSPADM

## 2018-12-21 RX ORDER — ONDANSETRON 2 MG/ML
INJECTION INTRAMUSCULAR; INTRAVENOUS AS NEEDED
Status: DISCONTINUED | OUTPATIENT
Start: 2018-12-21 | End: 2018-12-21 | Stop reason: HOSPADM

## 2018-12-21 RX ADMIN — DEXAMETHASONE SODIUM PHOSPHATE 4 MG: 4 INJECTION, SOLUTION INTRA-ARTICULAR; INTRALESIONAL; INTRAMUSCULAR; INTRAVENOUS; SOFT TISSUE at 08:48

## 2018-12-21 RX ADMIN — LIDOCAINE HYDROCHLORIDE 20 MG: 20 INJECTION, SOLUTION EPIDURAL; INFILTRATION; INTRACAUDAL; PERINEURAL at 08:45

## 2018-12-21 RX ADMIN — SODIUM CHLORIDE, SODIUM LACTATE, POTASSIUM CHLORIDE, CALCIUM CHLORIDE: 600; 310; 30; 20 INJECTION, SOLUTION INTRAVENOUS at 08:39

## 2018-12-21 RX ADMIN — FAMOTIDINE 20 MG: 20 TABLET ORAL at 07:36

## 2018-12-21 RX ADMIN — PROPOFOL 100 MG: 10 INJECTION, EMULSION INTRAVENOUS at 08:45

## 2018-12-21 RX ADMIN — FENTANYL CITRATE 50 MCG: 50 INJECTION, SOLUTION INTRAMUSCULAR; INTRAVENOUS at 08:44

## 2018-12-21 RX ADMIN — Medication 2 G: at 08:39

## 2018-12-21 RX ADMIN — SODIUM CHLORIDE, SODIUM LACTATE, POTASSIUM CHLORIDE, AND CALCIUM CHLORIDE 75 ML/HR: 600; 310; 30; 20 INJECTION, SOLUTION INTRAVENOUS at 07:36

## 2018-12-21 RX ADMIN — Medication 10 MG: at 08:56

## 2018-12-21 RX ADMIN — MIDAZOLAM HYDROCHLORIDE 2 MG: 1 INJECTION, SOLUTION INTRAMUSCULAR; INTRAVENOUS at 08:40

## 2018-12-21 RX ADMIN — ONDANSETRON 4 MG: 2 INJECTION INTRAMUSCULAR; INTRAVENOUS at 08:48

## 2018-12-21 NOTE — ANESTHESIA POSTPROCEDURE EVALUATION
Procedure(s):  SPACE OAR GEL PLACEMENT/GOLD MARKER PLACEMENT/ULTRASOUND GUIDANCE.     Anesthesia Post Evaluation      Multimodal analgesia: multimodal analgesia used between 6 hours prior to anesthesia start to PACU discharge  Patient location during evaluation: bedside  Patient participation: complete - patient participated  Level of consciousness: awake  Pain management: adequate  Airway patency: patent  Anesthetic complications: no  Cardiovascular status: stable  Respiratory status: acceptable  Hydration status: acceptable  Post anesthesia nausea and vomiting:  controlled      Visit Vitals  /71 (BP 1 Location: Right arm, BP Patient Position: At rest)   Pulse 71   Temp 36.1 °C (97 °F)   Resp 14   Ht 5' 9\" (1.753 m)   Wt 78.6 kg (173 lb 4 oz)   SpO2 95%   BMI 25.58 kg/m²

## 2018-12-21 NOTE — BRIEF OP NOTE
BRIEF OPERATIVE NOTE    Date of Procedure: 12/21/2018   Preoperative Diagnosis: C61 PROSTATE CANCER  Postoperative Diagnosis: C61 PROSTATE CANCER    Procedure(s):  SPACE OAR GEL PLACEMENT/GOLD MARKER PLACEMENT/ULTRASOUND GUIDANCE  Surgeon(s) and Role:      Gama Geronimo MD - Primary         Surgical Assistant: none    Surgical Staff:  Circ-1: Selmer Schaumann, RN  Scrub Tech-1: Kendy Quinn  Event Time In Time Out   Incision Start 7651    Incision Close 0905      Anesthesia: General   Estimated Blood Loss: none  Specimens: none   Findings: prostate dimensions  5.6 cm x 3.4 cm x 3.3 cm    Vol 95.1 cc  Complications: none  Implants:       Gold fiducial markers  Left side base and apex/ Right side mid      Jose Alfredo Patton MD

## 2018-12-21 NOTE — DISCHARGE INSTRUCTIONS
Light activity  Reg diet  Rx Cipro, Colace    rtc 3 weeks      Rohit Mann MD      What should you know? · There is a slight risk of causing problems such as infection or bleeding. · You may have a small amount of bleeding from your rectum for 2 to 3 days after the biopsy. · You may have a little pain in your pelvic area. You may also have a little blood in your urine for 1 to 5 days. · You may have some blood in your semen for a week or longer. · If you have anesthesia that makes you sleep, you will be in a recovery room for a few hours. You will need someone to drive you home. You may feel tired for the rest of the day. · You will probably be able to go home right away. · If your doctor had you stop taking any medicine, ask him or her when you can start taking it again. If you were given antibiotics, take them as directed. · Do not do heavy work or exercise for 4 hours after the test.  · Your doctor will tell you how long it may take to get your results back. Follow-up care is a key part of your treatment and safety. Be sure to make and go to all appointments, and call your doctor if you are having problems. It's also a good idea to keep a list of the medicines you take. Ask your doctor when you can expect to have your test results. Where can you learn more? Go to http://nurys-nikos.info/. Laxative, Stool Softeners (Doculax, Colace, Colace Clear, DSS) - (By mouth)   Why this medicine is used:   Treats constipation by helping you have a bowel movement. Contact a nurse or doctor right away if you have:  · Dark urine or pale stools  · Vomiting, loss of appetite, stomach pain  · Yellow skin or eyes     Common side effects:  · Nausea, diarrhea, stomach cramps, bitter taste in mouth  © 2017 [address] is for End User's use only and may not be sold, redistributed or otherwise used for commercial purposes.      Ciprofloxacin (Cipro) - (By mouth)   Why this medicine is used:   Treats infections. Contact a nurse or doctor right away if you have:  · Blistering, peeling, or red skin rash  · Fast, slow, or uneven heartbeat; lightheadedness or fainting  · Severe or bloody diarrhea  · Pain, stiffness, swelling, or bruises around your ankle, leg, shoulder, or other joint     Common side effects:  · Nausea  · Headache  © 2017 [facility] Information is for End User's use only and may not be sold, redistributed or otherwise used for commercial purposes. DISCHARGE SUMMARY from Nurse    PATIENT INSTRUCTIONS:    After general anesthesia or intravenous sedation, for 24 hours or while taking prescription Narcotics:  · Limit your activities  · Do not drive and operate hazardous machinery  · Do not make important personal or business decisions  · Do  not drink alcoholic beverages  · If you have not urinated within 8 hours after discharge, please contact your surgeon on call. Report the following to your surgeon:  · Excessive pain, swelling, redness or odor of or around the surgical area  · Temperature over 100.5  · Nausea and vomiting lasting longer than 4 hours or if unable to take medications  · Any signs of decreased circulation or nerve impairment to extremity: change in color, persistent  numbness, tingling, coldness or increase pain  · Any questions      *  Please give a list of your current medications to your Primary Care Provider. *  Please update this list whenever your medications are discontinued, doses are      changed, or new medications (including over-the-counter products) are added. *  Please carry medication information at all times in case of emergency situations. These are general instructions for a healthy lifestyle:    No smoking/ No tobacco products/ Avoid exposure to second hand smoke  Surgeon General's Warning:  Quitting smoking now greatly reduces serious risk to your health.     Obesity, smoking, and sedentary lifestyle greatly increases your risk for illness    A healthy diet, regular physical exercise & weight monitoring are important for maintaining a healthy lifestyle    You may be retaining fluid if you have a history of heart failure or if you experience any of the following symptoms:  Weight gain of 3 pounds or more overnight or 5 pounds in a week, increased swelling in our hands or feet or shortness of breath while lying flat in bed. Please call your doctor as soon as you notice any of these symptoms; do not wait until your next office visit. Recognize signs and symptoms of STROKE:    F-face looks uneven    A-arms unable to move or move unevenly    S-speech slurred or non-existent    T-time-call 911 as soon as signs and symptoms begin-DO NOT go       Back to bed or wait to see if you get better-TIME IS BRAIN. Warning Signs of HEART ATTACK     Call 911 if you have these symptoms:   Chest discomfort. Most heart attacks involve discomfort in the center of the chest that lasts more than a few minutes, or that goes away and comes back. It can feel like uncomfortable pressure, squeezing, fullness, or pain.  Discomfort in other areas of the upper body. Symptoms can include pain or discomfort in one or both arms, the back, neck, jaw, or stomach.  Shortness of breath with or without chest discomfort.  Other signs may include breaking out in a cold sweat, nausea, or lightheadedness. Don't wait more than five minutes to call 911 - MINUTES MATTER! Fast action can save your life. Calling 911 is almost always the fastest way to get lifesaving treatment. Emergency Medical Services staff can begin treatment when they arrive -- up to an hour sooner than if someone gets to the hospital by car. The discharge information has been reviewed with the patient and spouse. The patient and spouse verbalized understanding.   Discharge medications reviewed with the patient and spouse and appropriate educational materials and side effects teaching were provided.   ___________________________________________________________________________________________________________________________________

## 2018-12-21 NOTE — H&P
Patient has been evaluated by radiation oncology-Dr. Ibarra-IM RT with adjunctive androgen ablation therapy recommended      Bone scan on 17 October 2020 18- for metastatic lesions-old fracture right ribs -images have been reviewed with the patient  CT scan imaging of the abdomen and pelvis negative for signs of bryson disease but positive for left lower lobe pleural pulmonary nodule warranting further CT scan imaging     CT scan imaging of the chest on 12 November 2018 shows posterior left pleural nodule   Lungs:   No acute infiltrates are evident. .   The pleural-based opacity at the left posterior chest is again noted measuring  1.7 x 2.2 cm, not significantly changed from October 18. This measures 1.8 x 3.3  cm 2017 February there is a second linear nodular opacity just above scarring in  the lingula is also stable.        The bronchi appear unremarkable. elevated PSA found on routine testing     Patient notes recurrent pain and swelling in the right upper scrotum following initial improvement with drainage from I&D on 27 August 2018  C&S abscess discharge positive for staph aureus sensitive to Septra resistant to Cipro/allergic to sulfa patient complains of intermittent  Palpable mass in right inguinal region in addition to tender scrotal wall abscess in same region     PSA 27.3 on 27 August 2018 transperineal prostate biopsy on 1`0 October 2018 volume 70.0 cc PSA density 0.39 Kris 7 histology      Review of Systems:   CNS: No seizure syncope headaches dizziness or visual changes  Respiratory: No wheezing shortness of breath chest pain or coughing  Cardiovascular: No palpitations no angina  Intestinal: No dyspepsia diarrhea or constipation  Urinary:  mild obstructive and irritative voiding symptoms   Skeletal: No bone or joint pain  Endocrine: No diabetes or thyroid disease  Other:                 Allergies:          Allergies   Allergen Reactions    Sulfa (Sulfonamide Antibiotics) Other (comments)       As a child told by mother. Does not know reaction. Medications:           Current Outpatient Medications   Medication Sig Dispense Refill    bicalutamide (CASODEX) 50 mg tablet Take 1 Tab by mouth daily. 90 Tab 3    multivitamin (ONE A DAY) tablet Take 1 Tab by mouth daily.        lisinopril-hydroCHLOROthiazide (PRINZIDE, ZESTORETIC) 10-12.5 mg per tablet Take 1 Tab by mouth daily. 30 Tab 0    oxyCODONE IR (ROXICODONE) 5 mg immediate release tablet Take 1 Tab by mouth every four (4) hours as needed for Pain. Max Daily Amount: 30 mg. Indications: Pain 18 Tab 0              Past Medical History:   Diagnosis Date    Hypertension              Past Surgical History:   Procedure Laterality Date    HX TONSILLECTOMY        HX VASECTOMY          Social History            Socioeconomic History    Marital status: SINGLE       Spouse name: Not on file    Number of children: Not on file    Years of education: Not on file    Highest education level: Not on file   Social Needs    Financial resource strain: Not on file    Food insecurity - worry: Not on file    Food insecurity - inability: Not on file    Transportation needs - medical: Not on file   COPsync needs - non-medical: Not on file   Occupational History    Not on file   Tobacco Use    Smoking status: Former Smoker    Smokeless tobacco: Never Used   Substance and Sexual Activity    Alcohol use:  Yes       Comment: occasionally    Drug use: No    Sexual activity: No   Other Topics Concern    Not on file   Social History Narrative    Not on file            Family History   Problem Relation Age of Onset    Cancer Sister           breast         Physical Examination: Well-nourished mature male in no apparent distress  Neck: No masses nodes or bruits  lungs: Clear breath sounds bilaterally no wheezes rales or rhonchi  Heart: Regular sinus rhythm no murmurs gallops or rubs  Abdomen: Nontender no palpable masses no organomegaly no bruits back: Percussion CVA tenderness on either side  Skin: Warm and dry no rash no lesions   neurologic: No motor or sensory deficits in arms or legs  Genitalia: Penis is normal testes are normal bilaterally prostate by SEVERIANO is normal in size shape and consistency   no rectal masses induration or tenderness     Urinalysis: Negative dipstick/nitrite negative/heme-negative     Impression: [place] 7 adenocarcinoma the prostate / PSA 27.3                        Patient has elected definitive treatment by IM RT-adjunctive hormone ablation therapy with Lupron/Casodex                                                 2.2 cm left posterior pleural nodule     Plan: Lupron 45 mg IM right buttock today            Casodex 50 mg daily     Refer to primary physician-Dr. Jordan Lefort for evaluation of left chest pleural nodule     Fiducial marker placement and gel implantation-we will do this on 20 December 2018 if cleared by Dr. Estelle Godinez     Counseling Note:     Fiducial marker placement and spacer gel implantation techniques have been described discussed with patient today who understands and accepts the risks of bleeding, infection, and urinary retention which sometimes complicate  these procedures            Tara Apodaca MD

## 2018-12-21 NOTE — OP NOTES
[facility]  OPERATIVE REPORT    Hyacinth Mcneil  MR#: [id number]  : 1940  ACCOUNT #: [de-identified]   DATE OF SERVICE: 2018    PREOPERATIVE DIAGNOSIS:  Prostate carcinoma. POSTOPERATIVE DIAGNOSIS:  opal    PROCEDURE PERFORMED:  Gold fiducial marker placement with SpaceOAR gel implantation. SURGEON:  Nanda Joseph MD    ASSISTANT:  none    ANESTHESIA:  General by LMA. SPECIMENS REMOVED:  None. ESTIMATED BLOOD LOSS:  None. COMPLICATIONS:  None. IMPLANTS:  none    INDICATIONS FOR OPERATION:  A 61-year-old gentleman with [place] 7 prostate carcinoma found on prostate biopsy prompted by screening PSA level of 27.3. A bone scan and CT scan are negative for signs of metastatic disease. The patient has elected definitive treatment by IMRT which requires placement of gold fiducial markers and SpaceOAR gel implantation. DESCRIPTION OF OPERATION:  After establishing adequate general anesthesia by LMA, the patient was placed in the dorsal lithotomy position with legs suspended in Lico stirrups, cushioned with soft rubber padding. The external genitalia were then prepped with antibiotic scrub and solution and draped in a sterile manner. An 18-Ukrainian Morales catheter was then passed in the bladder, balloon inflated to 10 mL volume with saline solution to serve as a marker for the location of the intraprostatic urethra. A transrectal ultrasound probe was then used to visualize the prostate and prostate dimensions were determined to be 5.6 x 3.4 x 3.3 cm. The calculated prostatic volume is 33.3 cc. An 14-gauge needle was then passed into the perineum at the anal verge directed towards the prostate serving as a conduit for subsequent passage of a 21 gauge spinal needle, which was used to infiltrate 10 mL of saline into the Denonvilliers fascial layer  the posterior prostate from the anterior rectal wall.   10 mL of spacer gel mixture was then injected into the space creating a 1 cm separation between the posterior prostate and the anterior rectal wall. Fiducial marker needles were then passed through the conduit needle and gold markers were placed into the left lobe of the prostate, one at the apical region and one at the base. A single marker needle was then passed into the right lobe of the prostate and a gold marker was deposited in the midportion of that side of the gland. The conduit needles were removed as was the transrectal ultrasound probe and pressure was applied to the perineum for 1 minute. The Morales catheter was removed after draining dalia clear urine from the bladder. The procedure overall was uncomplicated, well tolerated by the patient who was brought from the operating room to the recovery room in good condition.       MD RADHA Stroud / [place]  D: 12/21/2018 09:28     T: 12/21/2018 09:49  JOB #: [id number]

## 2018-12-21 NOTE — DISCHARGE SUMMARY
Gold prostate fiducial marker placement with Space OAR gel implantation under gen anes by LMA- cheryl Foley MD

## 2018-12-21 NOTE — ANESTHESIA PREPROCEDURE EVALUATION
Anesthetic History     PONV          Review of Systems / Medical History  Patient summary reviewed and pertinent labs reviewed    Pulmonary  Within defined limits                 Neuro/Psych   Within defined limits           Cardiovascular    Hypertension              Exercise tolerance: >4 METS     GI/Hepatic/Renal  Within defined limits              Endo/Other        Cancer     Other Findings   Comments: Prostate ca           Physical Exam    Airway  Mallampati: II  TM Distance: 4 - 6 cm  Neck ROM: normal range of motion   Mouth opening: Normal     Cardiovascular  Regular rate and rhythm,  S1 and S2 normal,  no murmur, click, rub, or gallop  Rhythm: regular  Rate: normal         Dental    Dentition: Lower dentition intact and Upper dentition intact     Pulmonary  Breath sounds clear to auscultation               Abdominal  GI exam deferred       Other Findings            Anesthetic Plan    ASA: 3  Anesthesia type: general          Induction: Intravenous  Anesthetic plan and risks discussed with: Patient

## 2018-12-27 ENCOUNTER — HOSPITAL ENCOUNTER (OUTPATIENT)
Dept: RADIATION THERAPY | Age: 78
Discharge: HOME | End: 2018-12-27
Payer: MEDICARE

## 2018-12-27 LAB — CREAT UR-MCNC: 1.1 MG/DL (ref 0.6–1.3)

## 2018-12-27 PROCEDURE — 77334 RADIATION TREATMENT AID(S): CPT

## 2018-12-27 PROCEDURE — 82565 ASSAY OF CREATININE: CPT

## 2018-12-28 ENCOUNTER — HOSPITAL ENCOUNTER (OUTPATIENT)
Dept: RADIATION THERAPY | Age: 78
Discharge: HOME | End: 2018-12-28
Payer: MEDICARE

## 2019-01-02 ENCOUNTER — HOSPITAL ENCOUNTER (OUTPATIENT)
Dept: RADIATION THERAPY | Age: 79
Discharge: HOME | End: 2019-01-02
Payer: MEDICARE

## 2019-01-03 ENCOUNTER — HOSPITAL ENCOUNTER (OUTPATIENT)
Dept: RADIATION THERAPY | Age: 79
Discharge: HOME | End: 2019-01-03
Payer: MEDICARE

## 2019-01-04 ENCOUNTER — HOSPITAL ENCOUNTER (OUTPATIENT)
Dept: RADIATION THERAPY | Age: 79
Discharge: HOME | End: 2019-01-04
Payer: MEDICARE

## 2019-01-07 ENCOUNTER — HOSPITAL ENCOUNTER (OUTPATIENT)
Dept: RADIATION THERAPY | Age: 79
Discharge: HOME | End: 2019-01-07
Payer: MEDICARE

## 2019-01-07 PROCEDURE — 77301 RADIOTHERAPY DOSE PLAN IMRT: CPT

## 2019-01-07 PROCEDURE — 77300 RADIATION THERAPY DOSE PLAN: CPT

## 2019-01-07 PROCEDURE — 77338 DESIGN MLC DEVICE FOR IMRT: CPT

## 2019-01-08 ENCOUNTER — HOSPITAL ENCOUNTER (OUTPATIENT)
Dept: RADIATION THERAPY | Age: 79
Discharge: HOME | End: 2019-01-08
Payer: MEDICARE

## 2019-01-09 ENCOUNTER — HOSPITAL ENCOUNTER (OUTPATIENT)
Dept: RADIATION THERAPY | Age: 79
Discharge: HOME | End: 2019-01-09
Payer: MEDICARE

## 2019-01-09 PROCEDURE — 77385 HC IMRT TRMT DLVR SMPL: CPT

## 2019-01-10 ENCOUNTER — HOSPITAL ENCOUNTER (OUTPATIENT)
Dept: RADIATION THERAPY | Age: 79
Discharge: HOME | End: 2019-01-10
Payer: MEDICARE

## 2019-01-10 PROCEDURE — 77338 DESIGN MLC DEVICE FOR IMRT: CPT

## 2019-01-10 PROCEDURE — 77300 RADIATION THERAPY DOSE PLAN: CPT

## 2019-01-10 PROCEDURE — 77385 HC IMRT TRMT DLVR SMPL: CPT

## 2019-01-11 ENCOUNTER — HOSPITAL ENCOUNTER (OUTPATIENT)
Dept: RADIATION THERAPY | Age: 79
Discharge: HOME | End: 2019-01-11
Payer: MEDICARE

## 2019-01-11 PROCEDURE — 77385 HC IMRT TRMT DLVR SMPL: CPT

## 2019-01-14 ENCOUNTER — HOSPITAL ENCOUNTER (OUTPATIENT)
Dept: RADIATION THERAPY | Age: 79
Discharge: HOME | End: 2019-01-14
Payer: MEDICARE

## 2019-01-14 PROCEDURE — 77385 HC IMRT TRMT DLVR SMPL: CPT

## 2019-01-15 ENCOUNTER — HOSPITAL ENCOUNTER (OUTPATIENT)
Dept: RADIATION THERAPY | Age: 79
Discharge: HOME | End: 2019-01-15
Payer: MEDICARE

## 2019-01-15 PROCEDURE — 77336 RADIATION PHYSICS CONSULT: CPT

## 2019-01-15 PROCEDURE — 77385 HC IMRT TRMT DLVR SMPL: CPT

## 2019-01-16 ENCOUNTER — HOSPITAL ENCOUNTER (OUTPATIENT)
Dept: RADIATION THERAPY | Age: 79
Discharge: HOME | End: 2019-01-16
Payer: MEDICARE

## 2019-01-16 PROCEDURE — 77385 HC IMRT TRMT DLVR SMPL: CPT

## 2019-01-17 ENCOUNTER — HOSPITAL ENCOUNTER (OUTPATIENT)
Dept: RADIATION THERAPY | Age: 79
Discharge: HOME | End: 2019-01-17
Payer: MEDICARE

## 2019-01-17 PROCEDURE — 77385 HC IMRT TRMT DLVR SMPL: CPT

## 2019-01-18 ENCOUNTER — HOSPITAL ENCOUNTER (OUTPATIENT)
Dept: RADIATION THERAPY | Age: 79
Discharge: HOME | End: 2019-01-18
Payer: MEDICARE

## 2019-01-18 PROCEDURE — 77385 HC IMRT TRMT DLVR SMPL: CPT

## 2019-01-21 ENCOUNTER — HOSPITAL ENCOUNTER (OUTPATIENT)
Dept: RADIATION THERAPY | Age: 79
Discharge: HOME | End: 2019-01-21
Payer: MEDICARE

## 2019-01-21 PROCEDURE — 77385 HC IMRT TRMT DLVR SMPL: CPT

## 2019-01-22 ENCOUNTER — HOSPITAL ENCOUNTER (OUTPATIENT)
Dept: RADIATION THERAPY | Age: 79
Discharge: HOME | End: 2019-01-22
Payer: MEDICARE

## 2019-01-22 PROCEDURE — 77385 HC IMRT TRMT DLVR SMPL: CPT

## 2019-01-22 PROCEDURE — 77300 RADIATION THERAPY DOSE PLAN: CPT

## 2019-01-22 PROCEDURE — 77338 DESIGN MLC DEVICE FOR IMRT: CPT

## 2019-01-22 PROCEDURE — 77336 RADIATION PHYSICS CONSULT: CPT

## 2019-01-23 ENCOUNTER — HOSPITAL ENCOUNTER (OUTPATIENT)
Dept: RADIATION THERAPY | Age: 79
Discharge: HOME | End: 2019-01-23
Payer: MEDICARE

## 2019-01-23 PROCEDURE — 77385 HC IMRT TRMT DLVR SMPL: CPT

## 2019-01-24 ENCOUNTER — HOSPITAL ENCOUNTER (OUTPATIENT)
Dept: RADIATION THERAPY | Age: 79
Discharge: HOME | End: 2019-01-24
Payer: MEDICARE

## 2019-01-24 PROCEDURE — 77385 HC IMRT TRMT DLVR SMPL: CPT

## 2019-01-25 ENCOUNTER — HOSPITAL ENCOUNTER (OUTPATIENT)
Dept: RADIATION THERAPY | Age: 79
Discharge: HOME | End: 2019-01-25
Payer: MEDICARE

## 2019-01-25 PROCEDURE — 77385 HC IMRT TRMT DLVR SMPL: CPT

## 2019-01-29 ENCOUNTER — HOSPITAL ENCOUNTER (OUTPATIENT)
Dept: RADIATION THERAPY | Age: 79
Discharge: HOME | End: 2019-01-29
Payer: MEDICARE

## 2019-01-29 PROCEDURE — 77336 RADIATION PHYSICS CONSULT: CPT

## 2019-01-29 PROCEDURE — 77385 HC IMRT TRMT DLVR SMPL: CPT

## 2019-01-30 ENCOUNTER — HOSPITAL ENCOUNTER (OUTPATIENT)
Dept: RADIATION THERAPY | Age: 79
Discharge: HOME | End: 2019-01-30
Payer: MEDICARE

## 2019-01-30 PROCEDURE — 77385 HC IMRT TRMT DLVR SMPL: CPT

## 2019-01-31 ENCOUNTER — APPOINTMENT (OUTPATIENT)
Dept: RADIATION THERAPY | Age: 79
End: 2019-01-31
Payer: MEDICARE

## 2019-02-01 ENCOUNTER — HOSPITAL ENCOUNTER (OUTPATIENT)
Dept: RADIATION THERAPY | Age: 79
Discharge: HOME | End: 2019-02-01
Payer: MEDICARE

## 2019-02-01 PROCEDURE — 77385 HC IMRT TRMT DLVR SMPL: CPT

## 2019-02-04 ENCOUNTER — HOSPITAL ENCOUNTER (OUTPATIENT)
Dept: RADIATION THERAPY | Age: 79
Discharge: HOME | End: 2019-02-04
Payer: MEDICARE

## 2019-02-04 PROCEDURE — 77385 HC IMRT TRMT DLVR SMPL: CPT

## 2019-02-05 ENCOUNTER — HOSPITAL ENCOUNTER (OUTPATIENT)
Dept: RADIATION THERAPY | Age: 79
Discharge: HOME | End: 2019-02-05
Payer: MEDICARE

## 2019-02-05 PROCEDURE — 77385 HC IMRT TRMT DLVR SMPL: CPT

## 2019-02-06 ENCOUNTER — HOSPITAL ENCOUNTER (OUTPATIENT)
Dept: RADIATION THERAPY | Age: 79
Discharge: HOME | End: 2019-02-06
Payer: MEDICARE

## 2019-02-06 PROCEDURE — 77385 HC IMRT TRMT DLVR SMPL: CPT

## 2019-02-06 PROCEDURE — 77336 RADIATION PHYSICS CONSULT: CPT

## 2019-02-07 ENCOUNTER — HOSPITAL ENCOUNTER (OUTPATIENT)
Dept: RADIATION THERAPY | Age: 79
Discharge: HOME | End: 2019-02-07
Payer: MEDICARE

## 2019-02-07 PROCEDURE — 77385 HC IMRT TRMT DLVR SMPL: CPT

## 2019-02-08 ENCOUNTER — HOSPITAL ENCOUNTER (OUTPATIENT)
Dept: RADIATION THERAPY | Age: 79
Discharge: HOME | End: 2019-02-08
Payer: MEDICARE

## 2019-02-08 PROCEDURE — 77385 HC IMRT TRMT DLVR SMPL: CPT

## 2019-02-11 ENCOUNTER — HOSPITAL ENCOUNTER (OUTPATIENT)
Dept: RADIATION THERAPY | Age: 79
Discharge: HOME | End: 2019-02-11
Payer: MEDICARE

## 2019-02-11 PROCEDURE — 77385 HC IMRT TRMT DLVR SMPL: CPT

## 2019-02-12 ENCOUNTER — HOSPITAL ENCOUNTER (OUTPATIENT)
Dept: RADIATION THERAPY | Age: 79
Discharge: HOME | End: 2019-02-12
Payer: MEDICARE

## 2019-02-12 PROCEDURE — 77385 HC IMRT TRMT DLVR SMPL: CPT

## 2019-02-13 ENCOUNTER — HOSPITAL ENCOUNTER (OUTPATIENT)
Dept: RADIATION THERAPY | Age: 79
Discharge: HOME | End: 2019-02-13
Payer: MEDICARE

## 2019-02-13 PROCEDURE — 77336 RADIATION PHYSICS CONSULT: CPT

## 2019-02-13 PROCEDURE — 77385 HC IMRT TRMT DLVR SMPL: CPT

## 2019-02-14 ENCOUNTER — HOSPITAL ENCOUNTER (OUTPATIENT)
Dept: RADIATION THERAPY | Age: 79
Discharge: HOME | End: 2019-02-14
Payer: MEDICARE

## 2019-02-14 PROCEDURE — 77385 HC IMRT TRMT DLVR SMPL: CPT

## 2019-02-15 ENCOUNTER — HOSPITAL ENCOUNTER (OUTPATIENT)
Dept: RADIATION THERAPY | Age: 79
Discharge: HOME | End: 2019-02-15
Payer: MEDICARE

## 2019-02-15 PROCEDURE — 77385 HC IMRT TRMT DLVR SMPL: CPT

## 2019-02-18 ENCOUNTER — HOSPITAL ENCOUNTER (OUTPATIENT)
Dept: RADIATION THERAPY | Age: 79
Discharge: HOME | End: 2019-02-18
Payer: MEDICARE

## 2019-02-18 PROCEDURE — 77385 HC IMRT TRMT DLVR SMPL: CPT

## 2019-02-19 ENCOUNTER — HOSPITAL ENCOUNTER (OUTPATIENT)
Dept: RADIATION THERAPY | Age: 79
Discharge: HOME | End: 2019-02-19
Payer: MEDICARE

## 2019-02-19 PROCEDURE — 77385 HC IMRT TRMT DLVR SMPL: CPT

## 2019-02-20 ENCOUNTER — HOSPITAL ENCOUNTER (OUTPATIENT)
Dept: RADIATION THERAPY | Age: 79
Discharge: HOME | End: 2019-02-20
Payer: MEDICARE

## 2019-02-20 PROCEDURE — 77385 HC IMRT TRMT DLVR SMPL: CPT

## 2019-02-20 PROCEDURE — 77336 RADIATION PHYSICS CONSULT: CPT

## 2019-02-21 ENCOUNTER — HOSPITAL ENCOUNTER (OUTPATIENT)
Dept: RADIATION THERAPY | Age: 79
Discharge: HOME | End: 2019-02-21
Payer: MEDICARE

## 2019-02-21 PROCEDURE — 77385 HC IMRT TRMT DLVR SMPL: CPT

## 2019-02-22 ENCOUNTER — HOSPITAL ENCOUNTER (OUTPATIENT)
Dept: RADIATION THERAPY | Age: 79
Discharge: HOME | End: 2019-02-22
Payer: MEDICARE

## 2019-02-22 PROCEDURE — 77385 HC IMRT TRMT DLVR SMPL: CPT

## 2019-02-25 ENCOUNTER — HOSPITAL ENCOUNTER (OUTPATIENT)
Dept: RADIATION THERAPY | Age: 79
Discharge: HOME | End: 2019-02-25
Payer: MEDICARE

## 2019-02-25 PROCEDURE — 77385 HC IMRT TRMT DLVR SMPL: CPT

## 2019-02-26 ENCOUNTER — HOSPITAL ENCOUNTER (OUTPATIENT)
Dept: RADIATION THERAPY | Age: 79
Discharge: HOME | End: 2019-02-26
Payer: MEDICARE

## 2019-02-26 PROCEDURE — 77385 HC IMRT TRMT DLVR SMPL: CPT

## 2019-02-27 ENCOUNTER — HOSPITAL ENCOUNTER (OUTPATIENT)
Dept: RADIATION THERAPY | Age: 79
Discharge: HOME | End: 2019-02-27
Payer: MEDICARE

## 2019-02-27 PROCEDURE — 77336 RADIATION PHYSICS CONSULT: CPT

## 2019-02-27 PROCEDURE — 77385 HC IMRT TRMT DLVR SMPL: CPT

## 2019-02-28 ENCOUNTER — HOSPITAL ENCOUNTER (OUTPATIENT)
Dept: RADIATION THERAPY | Age: 79
Discharge: HOME | End: 2019-02-28
Payer: MEDICARE

## 2019-02-28 PROCEDURE — 77385 HC IMRT TRMT DLVR SMPL: CPT

## 2019-03-01 ENCOUNTER — HOSPITAL ENCOUNTER (OUTPATIENT)
Dept: RADIATION THERAPY | Age: 79
Discharge: HOME | End: 2019-03-01
Payer: MEDICARE

## 2019-03-01 PROCEDURE — 77385 HC IMRT TRMT DLVR SMPL: CPT

## 2019-03-04 ENCOUNTER — HOSPITAL ENCOUNTER (OUTPATIENT)
Dept: RADIATION THERAPY | Age: 79
Discharge: HOME | End: 2019-03-04
Payer: MEDICARE

## 2019-03-04 PROCEDURE — 77385 HC IMRT TRMT DLVR SMPL: CPT

## 2019-03-05 ENCOUNTER — HOSPITAL ENCOUNTER (OUTPATIENT)
Dept: RADIATION THERAPY | Age: 79
Discharge: HOME | End: 2019-03-05
Payer: MEDICARE

## 2019-03-05 PROCEDURE — 77385 HC IMRT TRMT DLVR SMPL: CPT

## 2019-03-06 ENCOUNTER — HOSPITAL ENCOUNTER (OUTPATIENT)
Dept: RADIATION THERAPY | Age: 79
Discharge: HOME | End: 2019-03-06
Payer: MEDICARE

## 2019-03-06 PROCEDURE — 77385 HC IMRT TRMT DLVR SMPL: CPT

## 2019-03-06 PROCEDURE — 77336 RADIATION PHYSICS CONSULT: CPT

## 2019-03-07 ENCOUNTER — HOSPITAL ENCOUNTER (OUTPATIENT)
Dept: RADIATION THERAPY | Age: 79
Discharge: HOME | End: 2019-03-07
Payer: MEDICARE

## 2019-03-07 PROCEDURE — 77385 HC IMRT TRMT DLVR SMPL: CPT

## 2019-03-08 ENCOUNTER — HOSPITAL ENCOUNTER (OUTPATIENT)
Dept: RADIATION THERAPY | Age: 79
Discharge: HOME | End: 2019-03-08
Payer: MEDICARE

## 2019-03-08 PROCEDURE — 77385 HC IMRT TRMT DLVR SMPL: CPT

## 2019-03-11 ENCOUNTER — HOSPITAL ENCOUNTER (OUTPATIENT)
Dept: RADIATION THERAPY | Age: 79
Discharge: HOME | End: 2019-03-11
Payer: MEDICARE

## 2019-03-11 PROCEDURE — 77385 HC IMRT TRMT DLVR SMPL: CPT

## 2019-03-12 ENCOUNTER — HOSPITAL ENCOUNTER (OUTPATIENT)
Dept: RADIATION THERAPY | Age: 79
Discharge: HOME | End: 2019-03-12
Payer: MEDICARE

## 2019-03-12 PROCEDURE — 77385 HC IMRT TRMT DLVR SMPL: CPT

## 2019-03-13 ENCOUNTER — HOSPITAL ENCOUNTER (OUTPATIENT)
Dept: RADIATION THERAPY | Age: 79
Discharge: HOME | End: 2019-03-13
Payer: MEDICARE

## 2019-03-13 PROCEDURE — 77385 HC IMRT TRMT DLVR SMPL: CPT

## 2019-03-13 PROCEDURE — 77336 RADIATION PHYSICS CONSULT: CPT

## 2019-04-23 ENCOUNTER — DOCUMENTATION ONLY (OUTPATIENT)
Dept: ONCOLOGY | Age: 79
End: 2019-04-23

## 2019-04-24 ENCOUNTER — HOSPITAL ENCOUNTER (OUTPATIENT)
Dept: RADIATION THERAPY | Age: 79
Discharge: HOME | End: 2019-04-24
Payer: MEDICARE

## 2019-04-24 ENCOUNTER — DOCUMENTATION ONLY (OUTPATIENT)
Dept: ONCOLOGY | Age: 79
End: 2019-04-24

## 2019-04-24 PROCEDURE — 99211 OFF/OP EST MAY X REQ PHY/QHP: CPT

## 2019-04-29 NOTE — PROGRESS NOTES
Survivorship Care Plan presented and reviewed with patient. Opportunity for questions given and answered to patients satisfaction per Ana Andrade RN.

## 2019-06-17 ENCOUNTER — HOSPITAL ENCOUNTER (OUTPATIENT)
Dept: LAB | Age: 79
Discharge: HOME | End: 2019-06-17
Payer: COMMERCIAL

## 2019-06-17 ENCOUNTER — CLINICAL SUPPORT (OUTPATIENT)
Dept: UROLOGY | Age: 79
End: 2019-06-17

## 2019-06-17 VITALS
BODY MASS INDEX: 24.73 KG/M2 | SYSTOLIC BLOOD PRESSURE: 117 MMHG | HEIGHT: 69 IN | OXYGEN SATURATION: 96 % | DIASTOLIC BLOOD PRESSURE: 67 MMHG | WEIGHT: 167 LBS | HEART RATE: 78 BPM

## 2019-06-17 DIAGNOSIS — K59.09 CHRONIC CONSTIPATION: ICD-10-CM

## 2019-06-17 DIAGNOSIS — C61 PROSTATE CANCER (HCC): ICD-10-CM

## 2019-06-17 DIAGNOSIS — C61 PROSTATE CANCER (HCC): Primary | ICD-10-CM

## 2019-06-17 LAB
BILIRUB UR QL STRIP: NEGATIVE
GLUCOSE UR-MCNC: NEGATIVE MG/DL
KETONES P FAST UR STRIP-MCNC: NEGATIVE MG/DL
PH UR STRIP: 7 [PH] (ref 4.6–8)
PROT UR QL STRIP: NEGATIVE
PSA SERPL-MCNC: 0 NG/ML (ref 0–4)
SP GR UR STRIP: 1.01 (ref 1–1.03)
UA UROBILINOGEN AMB POC: NORMAL (ref 0.2–1)
URINALYSIS CLARITY POC: CLEAR
URINALYSIS COLOR POC: YELLOW
URINE BLOOD POC: NEGATIVE
URINE LEUKOCYTES POC: NEGATIVE
URINE NITRITES POC: NEGATIVE

## 2019-06-17 PROCEDURE — 84153 ASSAY OF PSA TOTAL: CPT

## 2019-06-17 NOTE — PROGRESS NOTES
Yoni Garcia 70 y.o. male                                      Prostate Carcinoma Kris 7 Histology/PSA 27.3/IMRT Completed March 2019     Jose seen today for Lupron injection post IMRT  Patient is doing well and has no voiding symptoms at this time but does complain of constipation bloating and watery bowel movements after taking oral laxatives  Patient has not had colonoscopy done at any time previously    radiation oncology-Dr. Ibarra-IM RT with adjunctive androgen ablation therapy recommended      Bone scan on 17 October 2020 18- for metastatic lesions-old fracture right ribs -images have been reviewed with the patient  CT scan imaging of the abdomen and pelvis negative for signs of bryson disease but positive for left lower lobe pleural pulmonary nodule warranting further CT scan imaging     CT scan imaging of the chest on 12 November 2018 shows posterior left pleural nodule   Lungs:   No acute infiltrates are evident. .   The pleural-based opacity at the left posterior chest is again noted measuring  1.7 x 2.2 cm, not significantly changed from October 18. This measures 1.8 x 3.3  cm 2017 February there is a second linear nodular opacity just above scarring in  the lingula is also stable.        The bronchi appear unremarkable.   elevated PSA found on routine testing     Patient notes recurrent pain and swelling in the right upper scrotum following initial improvement with drainage from I&D on 27 August 2018  C&S abscess discharge positive for staph aureus sensitive to Septra resistant to Cipro/allergic to sulfa patient complains of intermittent  Palpable mass in right inguinal region in addition to tender scrotal wall abscess in same region     PSA 27.3 on 27 August 2018                                  transperineal prostate biopsy on 1`0 October 2018 volume 70.0 cc PSA density 0.39 Kris 7 histology                                                                                    Gold fiducial marker placement with spacer gel implantation 21 December 2018 IMRT March 2019     Review of Systems:   CNS: No seizure syncope headaches dizziness or visual changes  Respiratory: No wheezing shortness of breath chest pain or coughing  Cardiovascular: No palpitations no angina  Intestinal: No dyspepsia diarrhea or constipation  Urinary:  mild obstructive and irritative voiding symptoms   Skeletal: No bone or joint pain  Endocrine: No diabetes or thyroid disease  Other:     Allergies: Allergies   Allergen Reactions    Sulfa (Sulfonamide Antibiotics) Other (comments)     As a child told by mother. Does not know reaction. Medications:    Current Outpatient Medications   Medication Sig Dispense Refill    leuprolide depot (LUPRON 6 MONTH) 45 mg injection 1 Each by IntraMUSCular route once for 1 dose. 1 Each 0    bicalutamide (CASODEX) 50 mg tablet Take 1 Tab by mouth daily. 90 Tab 3    multivitamin (ONE A DAY) tablet Take 1 Tab by mouth daily.  lisinopril-hydroCHLOROthiazide (PRINZIDE, ZESTORETIC) 10-12.5 mg per tablet Take 1 Tab by mouth daily. 30 Tab 0    ibuprofen (MOTRIN) 200 mg tablet Take  by mouth every six (6) hours as needed for Pain.  oxyCODONE IR (ROXICODONE) 5 mg immediate release tablet Take 1 Tab by mouth every four (4) hours as needed for Pain. Max Daily Amount: 30 mg.  Indications: Pain 18 Tab 0       Past Medical History:   Diagnosis Date    Hypertension     Melanoma of skin ([address]     Nausea & vomiting     Prostate CA ([address] 2018      Past Surgical History:   Procedure Laterality Date    HX OTHER SURGICAL      melanoma's removed x8 per patient    HX TONSILLECTOMY      child    HX VASECTOMY       Social History     Socioeconomic History    Marital status: SINGLE     Spouse name: Not on file    Number of children: Not on file    Years of education: Not on file    Highest education level: Not on file   Occupational History    Not on file   Social Needs    Financial resource strain: Not on file    Food insecurity:     Worry: Not on file     Inability: Not on file    Transportation needs:     Medical: Not on file     Non-medical: Not on file   Tobacco Use    Smoking status: Former Smoker    Smokeless tobacco: Never Used    Tobacco comment: quit approx 30 years ago   Substance and Sexual Activity    Alcohol use: Yes     Comment: very seldom    Drug use: No    Sexual activity: Never   Lifestyle    Physical activity:     Days per week: Not on file     Minutes per session: Not on file    Stress: Not on file   Relationships    Social connections:     Talks on phone: Not on file     Gets together: Not on file     Attends Congregation service: Not on file     Active member of club or organization: Not on file     Attends meetings of clubs or organizations: Not on file     Relationship status: Not on file    Intimate partner violence:     Fear of current or ex partner: Not on file     Emotionally abused: Not on file     Physically abused: Not on file     Forced sexual activity: Not on file   Other Topics Concern    Not on file   Social History Narrative    Not on file      Family History   Problem Relation Age of Onset    Cancer Sister         breast        Physical Examination: Well-nourished trim and fit appearing adult male apparent distress      Urinalysis: Negative dipstick/nitrite negative/heme-negative    Impression: Prostate carcinoma stable 3 months status post IMRT                        -Chronic constipation with no history of surveillance colonoscopy      Plan: PSA today            Lupron 45 mg IM left buttock today    Refer to gastroenterology for colonoscopy    RTC 6 months PSA testing      More than 1/2 of this 25 minute visit was spent in counselling and coordination of care, as described above.   Neris Laughlin MD  -electronically signed-    PLEASE NOTE:  This document has been produced using voice recognition software. Unrecognized errors in transcription may be present.

## 2019-06-17 NOTE — PATIENT INSTRUCTIONS
Leuprolide (By injection) Leuprolide ([id number]) Treats endometriosis, uterine fibroids, and premature puberty. Also treats symptoms of prostate cancer. Brand Name(s): Eligard, Lupaneta Pack, Lupron Depot, Lupron Depot-Ped There may be other brand names for this medicine. When This Medicine Should Not Be Used: This medicine is not right for everyone. You should not receive it if you had an allergic reaction to leuprolide or similar medicines, or if you are pregnant or breastfeeding. How to Use This Medicine:  
Injectable · Your doctor will prescribe your exact dose and schedule. This medicine is given as a shot into a muscle or under the skin. Leuprolide injection is given on different schedules for different conditions. It might be given every day, once a month, or every few months. · A nurse or other health provider will give you this medicine. · You may be taught how to give your medicine at home. Make sure you understand all instructions before giving yourself an injection. Do not use more medicine or use it more often than your doctor tells you to. · You will be shown the body areas where this shot can be given. Use a different body area each time you give yourself a shot. Keep track of where you give each shot to make sure you rotate body areas. · Use a new needle and syringe each time you inject your medicine. · This medicine should come with a Medication Guide. Ask your pharmacist for a copy if you do not have one. · Read and follow the patient instructions that come with this medicine. Talk to your doctor or pharmacist if you have any questions. · Missed dose: This medicine needs to be given on a fixed schedule. If you miss a dose, call your doctor, home health caregiver, or treatment clinic for instructions. · If you store this medicine at home, keep it at room temperature, away from heat, moisture, and direct light. Drugs and Foods to Avoid: Ask your doctor or pharmacist before using any other medicine, including over-the-counter medicines, vitamins, and herbal products. · Some medicines can affect how leuprolide works. Tell your doctor if you are using any of the following: ¨ Medicine to treat depression (including bupropion) ¨ Medicine to treat heart rhythm problems ¨ Medicine to treat seizures ¨ Steroid medicine (including dexamethasone, hydrocortisone, methylprednisolone, prednisolone, prednisone) Warnings While Using This Medicine: · It is not safe to take this medicine during pregnancy. It could harm an unborn baby. Tell your doctor right away if you become pregnant. Women who are using this medicine should use birth control that does not contain hormones. · Tell your doctor if you have kidney disease, heart failure, diabetes, heart or blood vessel disease, heart rhythm problems (including long QT syndrome), problems with your nervous system, or a history of brain tumor, depression, mental illness, seizures, or stroke. · Women: Your menstrual periods should stop, but you might have light bleeding or spotting. If you continue to have heavy bleeding or regular periods, call your doctor. · This medicine may cause the following problems: 
¨ Changes in mood or behavior ¨ Increased risk for seizures ¨ Heart rhythm changes ¨ Weaker bones, which may lead to osteoporosis ¨ Problems with the urinary tract or spinal cord (in men) ¨ Changes in blood sugar levels (in men) ¨ Higher risk of heart attack or stroke (in men) · Your symptoms might get worse when you first start using this medicine, but they should get better as the medicine starts to work. If your condition does not begin to improve after 2 weeks, check with your doctor. · Tell any doctor or dentist who treats you that you are using this medicine. This medicine may affect certain medical test results.  
· Your doctor will check your progress and the effects of this medicine at regular visits. Keep all appointments. · Keep all medicine out of the reach of children. Never share your medicine with anyone. Possible Side Effects While Using This Medicine:  
Call your doctor right away if you notice any of these side effects: · Allergic reaction: Itching or hives, swelling in your face or hands, swelling or tingling in your mouth or throat, chest tightness, trouble breathing · Change in how much or how often you urinate · Depression, mood or behavior changes · Fast, pounding, or uneven heartbeat · Heavy vaginal bleeding · Seizures · Unusual or severe bone or back pain If you notice these less serious side effects, talk with your doctor:  
· Headache · Hot flashes and sweating, warmth or redness in your face, neck, arms, or upper chest 
· Loss of interest in sex, sexual problems · Pain, itching, burning, bruises, or swelling where the shot was given If you notice other side effects that you think are caused by this medicine, tell your doctor. Call your doctor for medical advice about side effects. You may report side effects to FDA at 9-383-FDA-3639 © 2017 [facility] Information is for End User's use only and may not be sold, redistributed or otherwise used for commercial purposes. The above information is an  only. It is not intended as medical advice for individual conditions or treatments. Talk to your doctor, nurse or pharmacist before following any medical regimen to see if it is safe and effective for you.

## 2019-06-20 ENCOUNTER — DOCUMENTATION ONLY (OUTPATIENT)
Dept: UROLOGY | Age: 79
End: 2019-06-20

## 2019-06-20 NOTE — PROGRESS NOTES
Telephone [phone] no answer left message on answering machine regarding PSA 0.0-this is expected as a result of Lupron anti-antigen therapy not the result of XRT    Chelsea Earl MD

## 2019-09-03 ENCOUNTER — HOSPITAL ENCOUNTER (OUTPATIENT)
Dept: LAB | Age: 79
Discharge: HOME | End: 2019-09-03
Payer: MEDICARE

## 2019-09-03 DIAGNOSIS — C61 MALIGNANT NEOPLASM OF PROSTATE (HCC): ICD-10-CM

## 2019-09-03 PROCEDURE — 84403 ASSAY OF TOTAL TESTOSTERONE: CPT

## 2019-09-03 PROCEDURE — 84154 ASSAY OF PSA FREE: CPT

## 2019-09-03 PROCEDURE — 36415 COLL VENOUS BLD VENIPUNCTURE: CPT

## 2019-09-04 LAB
PSA FREE MFR SERPL: NORMAL %
PSA FREE SERPL-MCNC: <0.02 NG/ML
PSA SERPL-MCNC: <0.1 NG/ML (ref 0–4)
TESTOST SERPL-MCNC: <3 NG/DL (ref 264–916)

## 2019-09-11 ENCOUNTER — HOSPITAL ENCOUNTER (OUTPATIENT)
Dept: RADIATION THERAPY | Age: 79
Discharge: HOME | End: 2019-09-11
Payer: MEDICARE

## 2019-09-11 PROCEDURE — 99211 OFF/OP EST MAY X REQ PHY/QHP: CPT

## 2019-10-08 ENCOUNTER — HOSPITAL ENCOUNTER (INPATIENT)
Age: 79
LOS: 3 days | Discharge: ANOTHER HOSPITAL | DRG: 835 | End: 2019-10-11
Attending: EMERGENCY MEDICINE | Admitting: HOSPITALIST
Payer: MEDICARE

## 2019-10-08 ENCOUNTER — APPOINTMENT (OUTPATIENT)
Dept: GENERAL RADIOLOGY | Age: 79
DRG: 835 | End: 2019-10-08
Attending: EMERGENCY MEDICINE
Payer: MEDICARE

## 2019-10-08 DIAGNOSIS — D69.6 THROMBOCYTOPENIA (HCC): Primary | ICD-10-CM

## 2019-10-08 LAB
ANION GAP SERPL CALC-SCNC: 10 MMOL/L (ref 3–18)
APPEARANCE UR: CLEAR
BASOPHILS # BLD: 0 K/UL (ref 0–0.06)
BASOPHILS NFR BLD: 0 % (ref 0–3)
BILIRUB UR QL: NEGATIVE
BLASTS NFR BLD MANUAL: 79 %
BUN SERPL-MCNC: 30 MG/DL (ref 7–18)
BUN/CREAT SERPL: 23 (ref 12–20)
CALCIUM SERPL-MCNC: 9.9 MG/DL (ref 8.5–10.1)
CHLORIDE SERPL-SCNC: 105 MMOL/L (ref 100–111)
CO2 SERPL-SCNC: 26 MMOL/L (ref 21–32)
COLOR UR: YELLOW
CREAT SERPL-MCNC: 1.31 MG/DL (ref 0.6–1.3)
DIFFERENTIAL METHOD BLD: ABNORMAL
EOSINOPHIL # BLD: 0 K/UL (ref 0–0.4)
EOSINOPHIL NFR BLD: 0 % (ref 0–5)
ERYTHROCYTE [DISTWIDTH] IN BLOOD BY AUTOMATED COUNT: 15.5 % (ref 11.6–14.5)
GLUCOSE SERPL-MCNC: 130 MG/DL (ref 74–99)
GLUCOSE UR STRIP.AUTO-MCNC: NEGATIVE MG/DL
HCT VFR BLD AUTO: 23.8 % (ref 36–48)
HGB BLD-MCNC: 8.1 G/DL (ref 13–16)
HGB UR QL STRIP: NEGATIVE
KETONES UR QL STRIP.AUTO: 15 MG/DL
LEUKOCYTE ESTERASE UR QL STRIP.AUTO: NEGATIVE
LYMPHOCYTES # BLD: 6.5 K/UL (ref 0.8–3.5)
LYMPHOCYTES NFR BLD: 10 % (ref 20–51)
MCH RBC QN AUTO: 30.6 PG (ref 24–34)
MCHC RBC AUTO-ENTMCNC: 34 G/DL (ref 31–37)
MCV RBC AUTO: 89.8 FL (ref 74–97)
METAMYELOCYTES NFR BLD MANUAL: 2 %
MONOCYTES # BLD: 5.8 K/UL (ref 0–1)
MONOCYTES NFR BLD: 9 % (ref 2–9)
NEUTS SEG # BLD: 0 K/UL (ref 1.8–8)
NEUTS SEG NFR BLD: 0 % (ref 42–75)
NITRITE UR QL STRIP.AUTO: NEGATIVE
NRBC BLD-RTO: 2 PER 100 WBC
PATH REV BLD -IMP: ABNORMAL
PH UR STRIP: 5 [PH] (ref 5–8)
PLATELET # BLD AUTO: 7 K/UL (ref 135–420)
PLATELET COMMENTS,PCOM: ABNORMAL
PMV BLD AUTO: 10.1 FL (ref 9.2–11.8)
POTASSIUM SERPL-SCNC: 4 MMOL/L (ref 3.5–5.5)
PROT UR STRIP-MCNC: NEGATIVE MG/DL
RBC # BLD AUTO: 2.65 M/UL (ref 4.7–5.5)
RBC MORPH BLD: ABNORMAL
RBC MORPH BLD: ABNORMAL
SODIUM SERPL-SCNC: 141 MMOL/L (ref 136–145)
SP GR UR REFRACTOMETRY: 1.02 (ref 1–1.03)
UROBILINOGEN UR QL STRIP.AUTO: 1 EU/DL (ref 0.2–1)
WBC # BLD AUTO: 64.7 K/UL (ref 4.6–13.2)
WBC MORPH BLD: ABNORMAL

## 2019-10-08 PROCEDURE — 85025 COMPLETE CBC W/AUTO DIFF WBC: CPT

## 2019-10-08 PROCEDURE — 74011000250 HC RX REV CODE- 250: Performed by: NURSE PRACTITIONER

## 2019-10-08 PROCEDURE — 99285 EMERGENCY DEPT VISIT HI MDM: CPT

## 2019-10-08 PROCEDURE — 71045 X-RAY EXAM CHEST 1 VIEW: CPT

## 2019-10-08 PROCEDURE — 30233R1 TRANSFUSION OF NONAUTOLOGOUS PLATELETS INTO PERIPHERAL VEIN, PERCUTANEOUS APPROACH: ICD-10-PCS | Performed by: INTERNAL MEDICINE

## 2019-10-08 PROCEDURE — 80048 BASIC METABOLIC PNL TOTAL CA: CPT

## 2019-10-08 PROCEDURE — 74011250637 HC RX REV CODE- 250/637: Performed by: NURSE PRACTITIONER

## 2019-10-08 PROCEDURE — 86900 BLOOD TYPING SEROLOGIC ABO: CPT

## 2019-10-08 PROCEDURE — P9037 PLATE PHERES LEUKOREDU IRRAD: HCPCS

## 2019-10-08 PROCEDURE — 86923 COMPATIBILITY TEST ELECTRIC: CPT

## 2019-10-08 PROCEDURE — 74011250636 HC RX REV CODE- 250/636: Performed by: NURSE PRACTITIONER

## 2019-10-08 PROCEDURE — 74011250636 HC RX REV CODE- 250/636: Performed by: EMERGENCY MEDICINE

## 2019-10-08 PROCEDURE — 36430 TRANSFUSION BLD/BLD COMPNT: CPT

## 2019-10-08 PROCEDURE — 81003 URINALYSIS AUTO W/O SCOPE: CPT

## 2019-10-08 PROCEDURE — 36415 COLL VENOUS BLD VENIPUNCTURE: CPT

## 2019-10-08 PROCEDURE — 65660000000 HC RM CCU STEPDOWN

## 2019-10-08 PROCEDURE — 85049 AUTOMATED PLATELET COUNT: CPT

## 2019-10-08 RX ORDER — SODIUM CHLORIDE 9 MG/ML
100 INJECTION, SOLUTION INTRAVENOUS CONTINUOUS
Status: DISCONTINUED | OUTPATIENT
Start: 2019-10-08 | End: 2019-10-11

## 2019-10-08 RX ORDER — ACETAMINOPHEN 325 MG/1
650 TABLET ORAL
Status: DISCONTINUED | OUTPATIENT
Start: 2019-10-08 | End: 2019-10-09

## 2019-10-08 RX ORDER — SODIUM CHLORIDE 9 MG/ML
250 INJECTION, SOLUTION INTRAVENOUS AS NEEDED
Status: DISCONTINUED | OUTPATIENT
Start: 2019-10-08 | End: 2019-10-10 | Stop reason: SDUPTHER

## 2019-10-08 RX ORDER — CLINDAMYCIN HYDROCHLORIDE 150 MG/1
150 CAPSULE ORAL EVERY 6 HOURS
COMMUNITY
End: 2019-10-11

## 2019-10-08 RX ORDER — ONDANSETRON 2 MG/ML
4 INJECTION INTRAMUSCULAR; INTRAVENOUS
Status: DISCONTINUED | OUTPATIENT
Start: 2019-10-08 | End: 2019-10-12 | Stop reason: HOSPADM

## 2019-10-08 RX ORDER — NYSTATIN 100000 [USP'U]/ML
500000 SUSPENSION ORAL 4 TIMES DAILY
Status: DISCONTINUED | OUTPATIENT
Start: 2019-10-08 | End: 2019-10-09

## 2019-10-08 RX ORDER — DOCUSATE SODIUM 100 MG/1
100 CAPSULE, LIQUID FILLED ORAL DAILY
Status: DISCONTINUED | OUTPATIENT
Start: 2019-10-09 | End: 2019-10-12 | Stop reason: HOSPADM

## 2019-10-08 RX ORDER — POLYETHYLENE GLYCOL 3350 17 G/17G
17 POWDER, FOR SOLUTION ORAL DAILY
Status: DISCONTINUED | OUTPATIENT
Start: 2019-10-09 | End: 2019-10-12 | Stop reason: HOSPADM

## 2019-10-08 RX ADMIN — NYSTATIN 500000 UNITS: 100000 SUSPENSION ORAL at 21:32

## 2019-10-08 RX ADMIN — SODIUM CHLORIDE 100 ML/HR: 900 INJECTION, SOLUTION INTRAVENOUS at 16:02

## 2019-10-08 RX ADMIN — SODIUM CHLORIDE 1000 ML: 900 INJECTION, SOLUTION INTRAVENOUS at 13:20

## 2019-10-08 RX ADMIN — ACETAMINOPHEN 650 MG: 325 TABLET ORAL at 19:23

## 2019-10-08 RX ADMIN — FAMOTIDINE 20 MG: 10 INJECTION INTRAVENOUS at 17:00

## 2019-10-08 NOTE — ED TRIAGE NOTES
\"I have gingivitis. I'm having trouble finding an oral surgeon. I've been feeling weak all over. \"

## 2019-10-08 NOTE — ROUTINE PROCESS
TRANSFER - IN REPORT: 
 
Verbal report received from [facility]) on Shayan Garcia  being received from ED(unit) for routine progression of care Report consisted of patients Situation, Background, Assessment and  
Recommendations(SBAR). Information from the following report(s) Kardex and ED Summary was reviewed with the receiving nurse. Opportunity for questions and clarification was provided. Assessment  Will be completed upon patients arrival to unit and care will be assumed.

## 2019-10-08 NOTE — ED PROVIDER NOTES
EMERGENCY DEPARTMENT HISTORY AND PHYSICAL EXAM    1:22 PM  Date: 10/8/2019  Patient Name: Edwar Garcia    History of Presenting Illness     Chief Complaint   Patient presents with    Fatigue       History Provided By: patient     HPI: Edwar Garcia is a 70 y.o. male with PMHx of prostate cancer  (last radiation session mid-March) presents with severe fatigue, weakness, weight loss (6 pounds in 2 months, difficulty swallowing, swollen glands. Patient denies any abdominal pain, any rectal bleeding. Denies any shortness of breath, any cough. Denies any chest pain. PCP: Johnson Paget, MD    Past History     Past Medical History:  Past Medical History:   Diagnosis Date    Hypertension     [facility] [address]     Nausea & vomiting     Prostate CA ([facility] [address] 2018       Past Surgical History:  Past Surgical History:   Procedure Laterality Date    HX OTHER SURGICAL      melanoma's removed x8 per patient    HX TONSILLECTOMY      child    HX VASECTOMY         Family History:  Family History   Problem Relation Age of Onset   [facility] Cancer Sister         breast       Social History:  Social History     Tobacco Use    Smoking status: Former Smoker    Smokeless tobacco: Never Used    Tobacco comment: quit approx 30 years ago   Substance Use Topics    Alcohol use: Yes     Comment: very seldom    Drug use: No       Allergies: Allergies   Allergen Reactions    Sulfa (Sulfonamide Antibiotics) Other (comments)     As a child told by mother. Does not know reaction. Review of Systems   Review of Systems   Constitutional: Positive for fatigue and unexpected weight change. Negative for activity change, appetite change and chills. HENT: Negative for congestion, ear discharge, ear pain and sore throat. Eyes: Negative for photophobia and pain. Respiratory: Negative for cough and choking. Cardiovascular: Negative for palpitations and leg swelling.    Gastrointestinal: Negative for anal bleeding and rectal pain. Endocrine: Negative for polydipsia and polyuria. Genitourinary: Negative for genital sores and urgency. Musculoskeletal: Negative for arthralgias and myalgias. Neurological: Negative for dizziness, seizures and speech difficulty. Psychiatric/Behavioral: Negative for hallucinations, self-injury and suicidal ideas. Physical Exam     Patient Vitals for the past 12 hrs:   Temp Pulse Resp BP SpO2   10/08/19 1303  91 22  96 %   10/08/19 1154 98.8 °F (37.1 °C) 100 14 112/64 97 %       Physical Exam   Constitutional: He is oriented to person, place, and time. He appears well-developed and well-nourished. HENT:   Head: Normocephalic and atraumatic. Eyes: Right eye exhibits no discharge. Left eye exhibits no discharge. Neck: Normal range of motion. Neck supple. Cardiovascular: Normal rate, regular rhythm, normal heart sounds and intact distal pulses. No murmur heard. Pulmonary/Chest: Effort normal and breath sounds normal. No stridor. No respiratory distress. He has no wheezes. He has no rales. He exhibits no tenderness. Abdominal: Soft. Bowel sounds are normal. He exhibits no distension. There is no tenderness. There is no rebound and no guarding. Musculoskeletal: Normal range of motion. Lymphadenopathy:     He has cervical adenopathy. Neurological: He is alert and oriented to person, place, and time. Skin: Skin is warm and dry. Psychiatric: He has a normal mood and affect. Nursing note and vitals reviewed.       Diagnostic Study Results     Labs -  Recent Results (from the past 12 hour(s))   CBC WITH AUTOMATED DIFF    Collection Time: 10/08/19 12:00 PM   Result Value Ref Range    WBC 64.7 (HH) 4.6 - 13.2 K/uL    RBC 2.65 (L) 4.70 - 5.50 M/uL    HGB 8.1 (L) 13.0 - 16.0 g/dL    HCT 23.8 (L) 36.0 - 48.0 %    MCV 89.8 74.0 - 97.0 FL    MCH 30.6 24.0 - 34.0 PG    MCHC 34.0 31.0 - 37.0 g/dL    RDW 15.5 (H) 11.6 - 14.5 %    PLATELET 7 (LL) 605 - 420 K/uL    MPV 10.1 9.2 - 11.8 FL    NEUTROPHILS PENDING %    LYMPHOCYTES PENDING %    MONOCYTES PENDING %    EOSINOPHILS PENDING %    BASOPHILS PENDING %    ABS. NEUTROPHILS PENDING K/UL    ABS. LYMPHOCYTES PENDING K/UL    ABS. MONOCYTES PENDING K/UL    ABS. EOSINOPHILS PENDING K/UL    ABS. BASOPHILS PENDING K/UL    DF PENDING    METABOLIC PANEL, BASIC    Collection Time: 10/08/19 12:00 PM   Result Value Ref Range    Sodium 141 136 - 145 mmol/L    Potassium 4.0 3.5 - 5.5 mmol/L    Chloride 105 100 - 111 mmol/L    CO2 26 21 - 32 mmol/L    Anion gap 10 3.0 - 18 mmol/L    Glucose 130 (H) 74 - 99 mg/dL    BUN 30 (H) 7.0 - 18 MG/DL    Creatinine 1.31 (H) 0.6 - 1.3 MG/DL    BUN/Creatinine ratio 23 (H) 12 - 20      GFR est AA >60 >60 ml/min/1.73m2    GFR est non-AA 53 (L) >60 ml/min/1.73m2    Calcium 9.9 8.5 - 10.1 MG/DL       Radiologic Studies -   No results found. Medical Decision Making     ED Course: Progress Notes, Reevaluation, and Consults:    1:22 PM Initial assessment performed. The patients presenting problems have been discussed, and they/their family are in agreement with the care plan formulated and outlined with them. I have encouraged them to ask questions as they arise throughout their visit. Provider Notes (Medical Decision Making):     Patient with a white cell count of 65  Many immature cells seen on smear  Platelets: 7  Spoke to Dr Destiny De La Rosa. Patient will be evaluated by the oncology team.  Recommended admission to hospitalist.  Spoke to hospitalist Dr. Pantera Hutchins who asked for a platelet transfusion- 1unit      Vital Signs-Reviewed the patient's vital signs. Reviewed pt's pulse ox reading. Records Reviewed: Old Medical Records (Time of Review: 1:22 PM)  -I am the first provider for this patient.  -I reviewed the vital signs, available nursing notes, past medical history, past surgical history, family history and social history.     Current Facility-Administered Medications   Medication Dose Route Frequency Provider Last Rate Last Dose    sodium chloride 0.9 % bolus infusion 1,000 mL  1,000 mL IntraVENous Adarsh Duggan MD         Current Outpatient Medications   Medication Sig Dispense Refill    ibuprofen (MOTRIN) 200 mg tablet Take  by mouth every six (6) hours as needed for Pain.  bicalutamide (CASODEX) 50 mg tablet Take 1 Tab by mouth daily. 90 Tab 3    multivitamin (ONE A DAY) tablet Take 1 Tab by mouth daily.  oxyCODONE IR (ROXICODONE) 5 mg immediate release tablet Take 1 Tab by mouth every four (4) hours as needed for Pain. Max Daily Amount: 30 mg. Indications: Pain 18 Tab 0    lisinopril-hydroCHLOROthiazide (PRINZIDE, ZESTORETIC) 10-12.5 mg per tablet Take 1 Tab by mouth daily. 30 Tab 0        Clinical Impression     Clinical Impression: thrombocytopenia  Disposition: admit      DISCHARGE NOTE:   Pt has been reexamined. Patient has no new complaints, changes, or physical findings. Care plan outlined and precautions discussed. Results were reviewed with the patient. All medications were reviewed with the patient; will d/c home with PMD f/u. All of pt's questions and concerns were addressed. Patient was instructed and agrees to follow up with PMD, as well as to return to the ED upon further deterioration. Patient is ready to go home. This note was dictated utilizing voice recognition software which may lead to typographical errors. I apologize in advance if the situation occurs. If questions arise please do not hesitate to contact me or call our department.           Brian Almanza MD  1:22 PM

## 2019-10-08 NOTE — H&P
History & Physical    Patient: Ariella Molina MRN: [id number]  CSN: [id number]    YOB: 1940  Age: 70 y.o. Sex: male      DOA: 10/8/2019  CC: FATIGUE    PCP: Sulaiman Irby MD       HPI:     Lauren Garcia is a 70 y.o. male who presents with fatigue and weakness. Weight loss 6 lbs in 2 months. Difficulty with swallowing and eating for 3-4 days secondary to swollen glands and sore throat. Diagnosed with gingivitis and taking PO antibiotics since 9/30/19, Clindamycin 150 mg TID. [facility] rapid strep negative and negative fungal swab. Platelets at that time 9. History of prostate cancer with last radiation therapy mid March 2019, Nuprene injection June 2019 and last PSA caleb September 2019. No oncologist. Urology following up with prostate cancer. In ED platelet count 7, leukocytosis 64.7. Wife at bedside. Code status discussed, full code. No advance directives in place. Review of Systems   Constitutional: Positive for weight loss. HENT: Positive for sore throat. Swollen glands   Eyes: Negative. Respiratory: Negative. Cardiovascular: Negative. Gastrointestinal: Negative. Genitourinary: Negative. Musculoskeletal: Negative. Skin: Negative. Neurological: Positive for weakness. Endo/Heme/Allergies: Negative. Psychiatric/Behavioral: Negative.           Past Medical History:   Diagnosis Date    Hypertension     [facility] ([facility] [address]     Nausea & vomiting     Prostate CA ([facility] [address] 2018       Past Surgical History:   Procedure Laterality Date    HX OTHER SURGICAL      melanoma's removed x8 per patient    HX TONSILLECTOMY      child    HX VASECTOMY         Family History   Problem Relation Age of Onset    Cancer Sister         breast       Social History     Socioeconomic History    Marital status:      Spouse name: Not on file    Number of children: Not on file    Years of education: Not on file    Highest education level: Not on file   Tobacco Use    Smoking status: Former Smoker    Smokeless tobacco: Never Used    Tobacco comment: quit approx 30 years ago   Substance and Sexual Activity    Alcohol use: Yes     Comment: very seldom    Drug use: No    Sexual activity: Never       Prior to Admission medications    Medication Sig Start Date End Date Taking? Authorizing Provider   bicalutamide (CASODEX) 50 mg tablet Take 1 Tab by mouth daily. 12/17/18  Yes Mahesh Devine MD   lisinopril-hydroCHLOROthiazide (PRINZIDE, ZESTORETIC) 10-12.5 mg per tablet Take 1 Tab by mouth daily. 2/1/17  Yes Liset Tamayo MD       Allergies   Allergen Reactions    Sulfa (Sulfonamide Antibiotics) Other (comments)     As a child told by mother. Does not know reaction. Physical Exam:      Visit Vitals  /64 (BP 1 Location: Left arm, BP Patient Position: At rest;Sitting)   Pulse 91   Temp 98.8 °F (37.1 °C)   Resp 22   Ht 5' 9\" (1.753 m)   Wt 68.9 kg (152 lb)   SpO2 96%   BMI 22.45 kg/m²       Physical Exam:  General:  Alert, NAD, thrush, enlarged uvula, swollen gums,   Cardiovascular:  RRR  Pulmonary:  LSC throughout; respiratory effort WNL  GI:  +BS in all four quadrants, soft, non-tender  Extremities:  No edema; 2+ dorsalis pedis pulses bilaterally  Neuro: alert and oriented x3    Lab/Data Review:  Labs: Results:       Chemistry Recent Labs     10/08/19  1200   *      K 4.0      CO2 26   BUN 30*   CREA 1.31*   CA 9.9   AGAP 10   BUCR 23*      CBC w/Diff Recent Labs     10/08/19  1200   WBC 64.7*   RBC 2.65*   HGB 8.1*   HCT 23.8*   PLT 7*   GRANS PENDING   LYMPH PENDING   EOS PENDING      Coagulation No results for input(s): PTP, INR, APTT, INREXT in the last 72 hours. Iron/Ferritin No results for input(s): IRON in the last 72 hours. No lab exists for component: TIBCCALC   BNP No results for input(s): BNPP in the last 72 hours.    Cardiac Enzymes No results for input(s): CPK, CKND1, RANJITH in the last 72 hours. No lab exists for component: CKRMB, TROIP   Liver Enzymes No results for input(s): TP, ALB, TBIL, AP, SGOT, GPT in the last 72 hours. No lab exists for component: DBIL   Thyroid Studies No results found for: T4, T3U, TSH, TSHEXT       All Micro Results     None          Imaging Reviewed:  XR Results (most recent):  Results from Hospital Encounter encounter on 10/08/19   XR CHEST PORT    Narrative EXAM:  XR CHEST PORT    INDICATION:   fatigue, r/o malignancy, patient with very elevated WCC    COMPARISON: None. FINDINGS:  The cardiac and mediastinal silhouette are within normal limits. Pulmonary  vasculature is within normal limits. No pneumothorax or pleural effusions. No  air space opacity. No acute osseous abnormality. Old healed left clavicle  fracture. Old left posterior rib fractures. Impression Impression:    No radiographic evidence of acute cardiopulmonary process. Assessment:   Active Problems: Thrombocytopenia ([address] (10/8/2019)    Adenopathy  Thrush  Gingivitis  Constipation  Leukocytosis  ROSAS ruled out. Weight loss    Plan:   1. Platelet transfusion. Monitor CBC, platelets. No anticoagulants. SCD  2. Oncology consult  3. CT chest/abd/pelvis ordered. Radiology recommendations for oral contrast so post pone CT until tomorrow. 4. Nystatin swish and swallow. Hold home PO antibiotic therapy for gingivitis. 5. Bowel regimen  6. Monitor metabolic and renal panel. IVF  7. Full liquid diet. Patient having difficulty swallowing d/t sore throat and enlarged lymph nodes. Nutrition consult for weight loss and supplementation  8. Palliative care for care decisions  9. PT/OT eval and treat  10. History of HTN. Soft BP. Home cardiac medications on hold. 11. Medication rec reviewed and completed.    12. CHRISTINE Rogers Dr. Shape  10/8/2019, 3:47 PM

## 2019-10-09 ENCOUNTER — APPOINTMENT (OUTPATIENT)
Dept: MRI IMAGING | Age: 79
DRG: 835 | End: 2019-10-09
Attending: INTERNAL MEDICINE
Payer: MEDICARE

## 2019-10-09 ENCOUNTER — APPOINTMENT (OUTPATIENT)
Dept: CT IMAGING | Age: 79
DRG: 835 | End: 2019-10-09
Attending: NURSE PRACTITIONER
Payer: MEDICARE

## 2019-10-09 LAB
ANION GAP SERPL CALC-SCNC: 6 MMOL/L (ref 3–18)
BASOPHILS # BLD: 0 K/UL (ref 0–0.06)
BASOPHILS # BLD: 0 K/UL (ref 0–0.06)
BASOPHILS NFR BLD: 0 % (ref 0–3)
BASOPHILS NFR BLD: 0 % (ref 0–3)
BLASTS NFR BLD MANUAL: 70 %
BLASTS NFR BLD MANUAL: 86 %
BLD PROD TYP BPU: NORMAL
BPU ID: NORMAL
BUN SERPL-MCNC: 26 MG/DL (ref 7–18)
BUN/CREAT SERPL: 25 (ref 12–20)
CALCIUM SERPL-MCNC: 8.5 MG/DL (ref 8.5–10.1)
CALLED TO:,BCALL1: NORMAL
CHLORIDE SERPL-SCNC: 105 MMOL/L (ref 100–111)
CO2 SERPL-SCNC: 27 MMOL/L (ref 21–32)
CREAT SERPL-MCNC: 1.02 MG/DL (ref 0.6–1.3)
DIFFERENTIAL METHOD BLD: ABNORMAL
DIFFERENTIAL METHOD BLD: ABNORMAL
EOSINOPHIL # BLD: 0 K/UL (ref 0–0.4)
EOSINOPHIL # BLD: 0 K/UL (ref 0–0.4)
EOSINOPHIL NFR BLD: 0 % (ref 0–5)
EOSINOPHIL NFR BLD: 0 % (ref 0–5)
ERYTHROCYTE [DISTWIDTH] IN BLOOD BY AUTOMATED COUNT: 15.5 % (ref 11.6–14.5)
ERYTHROCYTE [DISTWIDTH] IN BLOOD BY AUTOMATED COUNT: 15.6 % (ref 11.6–14.5)
FERRITIN SERPL-MCNC: 745 NG/ML (ref 8–388)
GLUCOSE SERPL-MCNC: 97 MG/DL (ref 74–99)
HCT VFR BLD AUTO: 19.2 % (ref 36–48)
HCT VFR BLD AUTO: 21.2 % (ref 36–48)
HCT VFR BLD AUTO: 23.8 % (ref 36–48)
HGB BLD-MCNC: 6.5 G/DL (ref 13–16)
HGB BLD-MCNC: 7.1 G/DL (ref 13–16)
HGB BLD-MCNC: 8.1 G/DL (ref 13–16)
IRON SATN MFR SERPL: 78 %
IRON SERPL-MCNC: 118 UG/DL (ref 50–175)
LYMPHOCYTES # BLD: 6.2 K/UL (ref 0.8–3.5)
LYMPHOCYTES # BLD: 8.5 K/UL (ref 0.8–3.5)
LYMPHOCYTES NFR BLD: 10 % (ref 20–51)
LYMPHOCYTES NFR BLD: 10 % (ref 20–51)
MCH RBC QN AUTO: 30.5 PG (ref 24–34)
MCH RBC QN AUTO: 30.7 PG (ref 24–34)
MCHC RBC AUTO-ENTMCNC: 33.5 G/DL (ref 31–37)
MCHC RBC AUTO-ENTMCNC: 33.9 G/DL (ref 31–37)
MCV RBC AUTO: 90.6 FL (ref 74–97)
MCV RBC AUTO: 91 FL (ref 74–97)
MONOCYTES # BLD: 0.6 K/UL (ref 0–1)
MONOCYTES # BLD: 9.4 K/UL (ref 0–1)
MONOCYTES NFR BLD: 1 % (ref 2–9)
MONOCYTES NFR BLD: 11 % (ref 2–9)
NEUTS SEG # BLD: 1.7 K/UL (ref 1.8–8)
NEUTS SEG # BLD: 1.9 K/UL (ref 1.8–8)
NEUTS SEG NFR BLD: 2 % (ref 42–75)
NEUTS SEG NFR BLD: 3 % (ref 42–75)
OTHER CELLS NFR BLD MANUAL: 7 %
PLATELET # BLD AUTO: 23 K/UL (ref 135–420)
PLATELET # BLD AUTO: 25 K/UL (ref 135–420)
PLATELET # BLD AUTO: 26 K/UL (ref 135–420)
PLATELET COMMENTS,PCOM: ABNORMAL
PLATELET COMMENTS,PCOM: ABNORMAL
PMV BLD AUTO: 10.2 FL (ref 9.2–11.8)
PMV BLD AUTO: 9.6 FL (ref 9.2–11.8)
POTASSIUM SERPL-SCNC: 3.2 MMOL/L (ref 3.5–5.5)
RBC # BLD AUTO: 2.12 M/UL (ref 4.7–5.5)
RBC # BLD AUTO: 2.33 M/UL (ref 4.7–5.5)
RBC MORPH BLD: ABNORMAL
RBC MORPH BLD: ABNORMAL
SODIUM SERPL-SCNC: 138 MMOL/L (ref 136–145)
STATUS OF UNIT,%ST: NORMAL
TIBC SERPL-MCNC: 151 UG/DL (ref 250–450)
UNIT DIVISION, %UDIV: 0
WBC # BLD AUTO: 61.9 K/UL (ref 4.6–13.2)
WBC # BLD AUTO: 85.4 K/UL (ref 4.6–13.2)
WBC MORPH BLD: ABNORMAL
WBC MORPH BLD: ABNORMAL

## 2019-10-09 PROCEDURE — 36415 COLL VENOUS BLD VENIPUNCTURE: CPT

## 2019-10-09 PROCEDURE — 74011250637 HC RX REV CODE- 250/637: Performed by: NURSE PRACTITIONER

## 2019-10-09 PROCEDURE — 74011000258 HC RX REV CODE- 258: Performed by: INTERNAL MEDICINE

## 2019-10-09 PROCEDURE — P9040 RBC LEUKOREDUCED IRRADIATED: HCPCS

## 2019-10-09 PROCEDURE — 65660000000 HC RM CCU STEPDOWN

## 2019-10-09 PROCEDURE — 92610 EVALUATE SWALLOWING FUNCTION: CPT

## 2019-10-09 PROCEDURE — 74011250636 HC RX REV CODE- 250/636: Performed by: HOSPITALIST

## 2019-10-09 PROCEDURE — 88185 FLOWCYTOMETRY/TC ADD-ON: CPT

## 2019-10-09 PROCEDURE — 92526 ORAL FUNCTION THERAPY: CPT

## 2019-10-09 PROCEDURE — 76450000000

## 2019-10-09 PROCEDURE — 74011250636 HC RX REV CODE- 250/636: Performed by: INTERNAL MEDICINE

## 2019-10-09 PROCEDURE — 74011250636 HC RX REV CODE- 250/636: Performed by: NURSE PRACTITIONER

## 2019-10-09 PROCEDURE — 74011000250 HC RX REV CODE- 250: Performed by: INTERNAL MEDICINE

## 2019-10-09 PROCEDURE — 88184 FLOWCYTOMETRY/ TC 1 MARKER: CPT

## 2019-10-09 PROCEDURE — 80048 BASIC METABOLIC PNL TOTAL CA: CPT

## 2019-10-09 PROCEDURE — 82728 ASSAY OF FERRITIN: CPT

## 2019-10-09 PROCEDURE — 83540 ASSAY OF IRON: CPT

## 2019-10-09 PROCEDURE — 30233N1 TRANSFUSION OF NONAUTOLOGOUS RED BLOOD CELLS INTO PERIPHERAL VEIN, PERCUTANEOUS APPROACH: ICD-10-PCS | Performed by: INTERNAL MEDICINE

## 2019-10-09 PROCEDURE — 36430 TRANSFUSION BLD/BLD COMPNT: CPT

## 2019-10-09 PROCEDURE — 74011636320 HC RX REV CODE- 636/320: Performed by: INTERNAL MEDICINE

## 2019-10-09 PROCEDURE — 74011000250 HC RX REV CODE- 250: Performed by: NURSE PRACTITIONER

## 2019-10-09 PROCEDURE — 88377 M/PHMTRC ALYS ISHQUANT/SEMIQ: CPT

## 2019-10-09 PROCEDURE — 71260 CT THORAX DX C+: CPT

## 2019-10-09 PROCEDURE — 85018 HEMOGLOBIN: CPT

## 2019-10-09 PROCEDURE — 88374 M/PHMTRC ALYS ISHQUANT/SEMIQ: CPT

## 2019-10-09 PROCEDURE — 85025 COMPLETE CBC W/AUTO DIFF WBC: CPT

## 2019-10-09 RX ORDER — FLUCONAZOLE 200 MG/1
200 TABLET ORAL DAILY
Status: DISCONTINUED | OUTPATIENT
Start: 2019-10-10 | End: 2019-10-12 | Stop reason: HOSPADM

## 2019-10-09 RX ORDER — DIPHENHYDRAMINE HYDROCHLORIDE 50 MG/ML
25 INJECTION, SOLUTION INTRAMUSCULAR; INTRAVENOUS ONCE
Status: COMPLETED | OUTPATIENT
Start: 2019-10-09 | End: 2019-10-09

## 2019-10-09 RX ORDER — SODIUM CHLORIDE 9 MG/ML
250 INJECTION, SOLUTION INTRAVENOUS AS NEEDED
Status: DISCONTINUED | OUTPATIENT
Start: 2019-10-09 | End: 2019-10-12 | Stop reason: HOSPADM

## 2019-10-09 RX ORDER — LORAZEPAM 2 MG/ML
1 INJECTION INTRAMUSCULAR ONCE
Status: COMPLETED | OUTPATIENT
Start: 2019-10-09 | End: 2019-10-09

## 2019-10-09 RX ADMIN — FAMOTIDINE 20 MG: 10 INJECTION INTRAVENOUS at 20:37

## 2019-10-09 RX ADMIN — DIPHENHYDRAMINE HYDROCHLORIDE 25 MG: 50 INJECTION INTRAMUSCULAR; INTRAVENOUS at 13:13

## 2019-10-09 RX ADMIN — LORAZEPAM 1 MG: 2 INJECTION INTRAMUSCULAR; INTRAVENOUS at 23:49

## 2019-10-09 RX ADMIN — NYSTATIN 500000 UNITS: 100000 SUSPENSION ORAL at 08:42

## 2019-10-09 RX ADMIN — DIATRIZOATE MEGLUMINE AND DIATRIZOATE SODIUM 30 ML: 660; 100 LIQUID ORAL; RECTAL at 08:42

## 2019-10-09 RX ADMIN — NYSTATIN 500000 UNITS: 100000 SUSPENSION ORAL at 13:36

## 2019-10-09 RX ADMIN — POTASSIUM CHLORIDE: 2 INJECTION, SOLUTION, CONCENTRATE INTRAVENOUS at 19:00

## 2019-10-09 RX ADMIN — SODIUM CHLORIDE 100 ML/HR: 900 INJECTION, SOLUTION INTRAVENOUS at 17:19

## 2019-10-09 RX ADMIN — METHYLPREDNISOLONE SODIUM SUCCINATE 30 MG: 40 INJECTION, POWDER, FOR SOLUTION INTRAMUSCULAR; INTRAVENOUS at 13:13

## 2019-10-09 RX ADMIN — SODIUM CHLORIDE 100 ML/HR: 900 INJECTION, SOLUTION INTRAVENOUS at 00:28

## 2019-10-09 RX ADMIN — FAMOTIDINE 20 MG: 10 INJECTION INTRAVENOUS at 13:37

## 2019-10-09 RX ADMIN — POTASSIUM CHLORIDE: 2 INJECTION, SOLUTION, CONCENTRATE INTRAVENOUS at 18:00

## 2019-10-09 RX ADMIN — IOPAMIDOL 100 ML: 612 INJECTION, SOLUTION INTRAVENOUS at 12:00

## 2019-10-09 RX ADMIN — METHYLPREDNISOLONE SODIUM SUCCINATE 30 MG: 40 INJECTION, POWDER, FOR SOLUTION INTRAMUSCULAR; INTRAVENOUS at 20:37

## 2019-10-09 RX ADMIN — POTASSIUM CHLORIDE: 2 INJECTION, SOLUTION, CONCENTRATE INTRAVENOUS at 20:00

## 2019-10-09 RX ADMIN — ACETAMINOPHEN 650 MG: 650 SOLUTION ORAL at 13:36

## 2019-10-09 NOTE — PROGRESS NOTES
Reason for Admission: Thrombocytopenia ([facility] [address] [D69.6]                 RRAT Score:    3            Plan for utilizing home health:    Pt refusing                      Likelihood of Readmission:   LOW                         Transition of Care Plan:              Initial assessment completed with patient. Cognitive status of patient: oriented to time, place, person and situation. Face sheet information confirmed:  yes. The patient designates Spouse Kym Wilder to participate in his discharge plan and to receive any needed information. This patient lives in a single family home with patient and spouse. Patient is able to navigate steps as needed. Prior to hospitalization, patient was considered to be independent with ADLs/IADLS : yes    Patient has a current ACP document on file: no  The patient and wife will be available to transport patient home upon discharge. The patient already has [employer],  medical equipment available in the home. Patient is not currently active with home health. Patient has not stayed in a skilled nursing facility or rehab. This patient is on dialysis :no      Freedom of choice signed: no. Currently, the discharge plan is Home. The patient states that he can obtain his medications from the pharmacy, and take his medications as directed. Patient's current insurance is [facility] [employer]       Care Management Interventions  PCP Verified by CM:  Yes  Last Visit to PCP: 06/09/19  Mode of Transport at Discharge: Self  Physical Therapy Consult: Yes  Occupational Therapy Consult: Yes  Current Support Network: Lives with Spouse  Plan discussed with Pt/Family/Caregiver: Yes  Discharge Location  Discharge Placement: [address], RN BSN  Care Manager  [phone]

## 2019-10-09 NOTE — PALLIATIVE CARE
Thank you for consult. Chart reviewed. Discussed case with Dr. Toyin Mckoy. Patient will be undergoing extensive work up for new diagnosis. Will hold off on seeing patient in consult until additional diagnostic information available. In agreement with Dr. Toyin Mckoy will see patient on Monday, 10/14/2019.

## 2019-10-09 NOTE — PROGRESS NOTES
PT orders received, chart reviewed. Pt unable to participate with PT due to:  []  Nausea/vomiting  []  Eating  []  Pain  []  Pt lethargic  [x]  Off Unit  []  Pt refused  []  Other   Will f/u later as patient's schedule allows.  Thank you for this referral.  Katherin Hetser, PT, DPT

## 2019-10-09 NOTE — CONSULTS
Infectious Disease Consultation Note    Requested by: dr. Kody Mccoy    Reason: evaluate for gingivitis, throat infection, leukocytosis    Current abx Prior abx         Lines:       Assessment :    70 y. o. male with h/o prostate cancer presented to ed on 10/8/19 with fatigue and weakness. Now with difficulty swallowing, gingival hyperplasia, significant leukocytosis, anemia, thrombocytopenia. Highly complex clinical picture. Dysphagia could be due to candidiasis, cervical lymphadenopathy. Gingival hyperplasia could be related to the systemic illness/?hematological malignancy (profound leukocytosis in absence of definitive infection likely suggest this). Hematology consult appreciated. Recommendations:    1. Start fluconazole. 2. F/u MRI face/neck to rule out abscess  3. Monitor cbc, clinically  4. F/u hematology recommendations regarding bone marrow biopsy    Advance Care planning:full code: discussed  with patient/surrogate decision maker; nikki carpenter: [phone]  Thank you for consultation request. Above plan was discussed in details with patient, and dr Yasmeen Winslow. Please call me if any further questions or concerns. Will continue to participate in the care of this patient. HPI:    70 y. o. male with h/o prostate cancer presented to ed on 10/8/19 with fatigue and weakness. Associated Weight loss 6 lbs in 2 months. Difficulty with swallowing and eating for 3-4 days secondary to swollen glands and sore throat. Diagnosed with gingivitis recently and taking PO antibiotics ( Clindamycin) since 9/30/19. Was evaluated at [facility]" on 10/6/19 rapid strep negative and negative fungal swab. Platelets at that time 9,000. History of prostate cancer with last radiation therapy mid March 2019, Nuprene injection June 2019 and last PSA  September 2019. In ED platelet count 2,449, leukocytosis 64.7. Pt was transfused 1 unit of platelets.  Today platelet count is 25,493.     On admission WBC 64,7m Hgb 8.1, Hct 23.8, ANC 0.0 Sodium 141, Potassium 4.0, Cl 105, Glucose 130, BUN 30, Creatinine 1.31. CT scan 10/9/19-  multiple axillary, mediastinal, abdominal and pelvic  Nodes. I have been consulted for further recommendations. Patient states that one of his tooth fell out about 3 weeks ago. He was told by PCP that this was due to gum swelling. Patient denies fever, headaches, visual disturbances, runny nose, earaches, cp, sob, chills, cough, abdominal pain, diarrhea, burning micturition, pain or weakness in extremities. He denies back pain/flank pain. He denies recent sick contacts. No h/o recent travel. No known h/o MRSA colonization or infection in the past.        Past Medical History:   Diagnosis Date    Hypertension     Melanoma of skin ([facility] [address]     Nausea & vomiting     Prostate CA ([facility] [address] 2018       Past Surgical History:   Procedure Laterality Date    HX OTHER SURGICAL      melanoma's removed x8 per patient    HX TONSILLECTOMY      child    HX VASECTOMY         home Medication List    Details   clindamycin (CLEOCIN) 150 mg capsule Take 150 mg by mouth every six (6) hours. bicalutamide (CASODEX) 50 mg tablet Take 1 Tab by mouth daily. Qty: 90 Tab, Refills: 3    Associated Diagnoses: Prostate cancer ([facility] [address]      lisinopril-hydroCHLOROthiazide (PRINZIDE, ZESTORETIC) 10-12.5 mg per tablet Take 1 Tab by mouth daily.   Qty: 30 Tab, Refills: 0             Current Facility-Administered Medications   Medication Dose Route Frequency    methylPREDNISolone (PF) (SOLU-MEDROL) injection 30 mg  30 mg IntraVENous Q12H    0.9% sodium chloride infusion 250 mL  250 mL IntraVENous PRN    famotidine (PF) (PEPCID) 20 mg in sodium chloride 0.9% 10 mL injection  20 mg IntraVENous Q12H    [START ON 42/43/6586] multivit-folic acid-herbal 943 (WELLESSE PLUS) oral liquid 30 mL  30 mL Oral DAILY    0.9% sodium chloride infusion 250 mL  250 mL IntraVENous PRN    0.9% sodium chloride infusion 250 mL  250 mL IntraVENous PRN    acetaminophen (TYLENOL) tablet 650 mg  650 mg Oral Q4H PRN    ondansetron (ZOFRAN) injection 4 mg  4 mg IntraVENous Q4H PRN    nystatin (MYCOSTATIN) 100,000 unit/mL oral suspension 500,000 Units  500,000 Units Oral QID    polyethylene glycol (MIRALAX) packet 17 g  17 g Oral DAILY    docusate sodium (COLACE) capsule 100 mg  100 mg Oral DAILY    0.9% sodium chloride infusion  100 mL/hr IntraVENous CONTINUOUS       Allergies: Sulfa (sulfonamide antibiotics)    Family History   Problem Relation Age of Onset    Cancer Sister         breast     Social History     Socioeconomic History    Marital status:      Spouse name: Not on file    Number of children: Not on file    Years of education: Not on file    Highest education level: Not on file   Occupational History    Not on file   Social Needs    Financial resource strain: Not on file    Food insecurity:     Worry: Not on file     Inability: Not on file    Transportation needs:     Medical: Not on file     Non-medical: Not on file   Tobacco Use    Smoking status: Former Smoker    Smokeless tobacco: Never Used    Tobacco comment: quit approx 30 years ago   Substance and Sexual Activity    Alcohol use: Yes     Comment: very seldom    Drug use: No    Sexual activity: Never   Lifestyle    Physical activity:     Days per week: Not on file     Minutes per session: Not on file    Stress: Not on file   Relationships    Social connections:     Talks on phone: Not on file     Gets together: Not on file     Attends [employer] service: Not on file     Active member of club or organization: Not on file     Attends meetings of clubs or organizations: Not on file     Relationship status: Not on file    Intimate partner violence:     Fear of current or ex partner: Not on file     Emotionally abused: Not on file     Physically abused: Not on file     Forced sexual activity: Not on file   Other Topics Concern    Not on file   Social History Narrative    Not on file     Social History     Tobacco Use   Smoking Status Former Smoker   Smokeless Tobacco Never Used   Tobacco Comment    quit approx 30 years ago        Temp (24hrs), Av °F (37.2 °C), Min:97.3 °F (36.3 °C), Max:100.4 °F (38 °C)    Visit Vitals  /71 (BP 1 Location: Right arm, BP Patient Position: At rest)   Pulse 93   Temp 98.5 °F (36.9 °C)   Resp 14   Ht 5' 9\" (1.753 m)   Wt 71.7 kg (158 lb)   SpO2 94%   BMI 23.33 kg/m²       ROS: 12 point ROS obtained in details. Pertinent positives as mentioned in HPI,   otherwise negative    Physical Exam:    Constitutional: He is oriented to person, place, and time. He appears well-developed and well-nourished. HENT:   Head: Normocephalic and atraumatic. Swollen gum. Missing right upper tooth. No erythema of the gums. White patches on gums, posterior pharynx  Eyes: Right eye exhibits no discharge. Left eye exhibits no discharge. Neck: Normal range of motion. Neck supple. Cardiovascular: Normal rate, regular rhythm, normal heart sounds and intact distal pulses. No murmur heard. Pulmonary/Chest: Effort normal and breath sounds normal. No stridor. No respiratory distress. He has no wheezes. He has no rales. He exhibits no tenderness. Abdominal: Soft. Bowel sounds are normal. He exhibits no distension. There is no tenderness. There is no rebound and no guarding. Musculoskeletal: Normal range of motion. Lymphadenopathy:     He has cervical lymphadenopathy, axillary, inguinal lymphadenopathy - non tender  Neurological: He is alert and oriented to person, place, and time. Skin: Skin is warm and dry. Psychiatric: He has a normal mood and affect.    Nursing note and vitals reviewed.       Labs: Results:   Chemistry Recent Labs     10/09/19  0250 10/08/19  1200   GLU 97 130*    141   K 3.2* 4.0    105   CO2 27 26   BUN 26* 30*   CREA 1.02 1.31*   CA 8.5 9.9   AGAP 6 10   BUCR 25* 23*      CBC w/Diff Recent Labs     10/09/19  1006 10/09/19  0250 10/08/19  2348 10/08/19  1200   WBC 61.9* 85.4*  --  64.7*   RBC 2.33* 2.12*  --  2.65*   HGB 7.1* 6.5*  --  8.1*   HCT 21.2* 19.2*  --  23.8*   PLT 26* 23* 25* 7*   GRANS 3* 2*  --  0*   LYMPH 10* 10*  --  10*   EOS 0 0  --  0      Microbiology No results for input(s): CULT in the last 72 hours.        RADIOLOGY:    All available imaging studies/reports in [facility] for this admission were reviewed    Dr. Esme Mcleod, Infectious Disease Specialist  [phone]  October 9, 2019  2:16 PM

## 2019-10-09 NOTE — PROGRESS NOTES
[facility] Hospitalist Group  Progress Note    Patient: Lavonne Garcia Age: 70 y.o. : 1940 MR#: [id number] SSN: [id number]  Date/Time: 10/9/2019     Subjective:     Review of systems    C/o Throat pain and dysphagia     No CP   NO NVD  No SOB  NO Cough     Assessment/Plan:   Admitted with severe throat infection history , failed out patient antibiotics , here in hospital patient has severe leucocytosis , Anemia and low Plt . With Blasts      1. Acute Throat infection   - Continue antibiotics per ID   - Follow cultures   - Liquid diet   - Follow MRI neck   - On Steroids     2 Leucocytosis / Thrombocytopenia / Anemia   - BT ordered   - Follow with oncology   - May need Bone marrow     Full CODE     GI prophylaxis     D/W palliative - advised to follow up by next week once we have some idea regarding his disease and prognosis         Case discussed with:  [x]Patient  [x] Family ( In room with patient )    [x]Nursing  [x]Case Management  DVT Prophylaxis:  []Lovenox  []Hep SQ  [x]SCDs  []Coumadin   []On Heparin gtt          Objective:   VS:   Visit Vitals  /59 (BP 1 Location: Left arm, BP Patient Position: At rest)   Pulse 86   Temp 98.7 °F (37.1 °C)   Resp 20   Ht 5' 9\" (1.753 m)   Wt 71.7 kg (158 lb)   SpO2 96%   BMI 23.33 kg/m²      Tmax/24hrs: Temp (24hrs), Av.2 °F (37.3 °C), Min:98.3 °F (36.8 °C), Max:100.4 °F (38 °C)  IOBRIEF    Intake/Output Summary (Last 24 hours) at 10/9/2019 0853  Last data filed at 10/9/2019 0400  Gross per 24 hour   Intake 1000 ml   Output 200 ml   Net 800 ml       General:  Alert, cooperative, no acute distress   HEENT :  Dirty white  patches over oropharyngeal  area   Cardiovascular: S1S2 - regular , No Murmur   Pulmonary: Equal expansion , No Use of accessory muscles , No Rales No Rhonchi    GI:  +BS in all four quadrants, soft, non-tender  Extremities:  No edema; 2+ dorsalis pedis pulses bilaterally  Neuro: Alert and oriented X 2. Medications:   Current Facility-Administered Medications   Medication Dose Route Frequency    methylPREDNISolone (PF) (SOLU-MEDROL) injection 30 mg  30 mg IntraVENous Q12H    0.9% sodium chloride infusion 250 mL  250 mL IntraVENous PRN    diphenhydrAMINE (BENADRYL) injection 25 mg  25 mg IntraVENous ONCE    acetaminophen (TYLENOL) solution 650 mg  650 mg Oral ONCE    0.9% sodium chloride infusion 250 mL  250 mL IntraVENous PRN    0.9% sodium chloride infusion 250 mL  250 mL IntraVENous PRN    acetaminophen (TYLENOL) tablet 650 mg  650 mg Oral Q4H PRN    ondansetron (ZOFRAN) injection 4 mg  4 mg IntraVENous Q4H PRN    nystatin (MYCOSTATIN) 100,000 unit/mL oral suspension 500,000 Units  500,000 Units Oral QID    polyethylene glycol (MIRALAX) packet 17 g  17 g Oral DAILY    docusate sodium (COLACE) capsule 100 mg  100 mg Oral DAILY    0.9% sodium chloride infusion  100 mL/hr IntraVENous CONTINUOUS    famotidine (PF) (PEPCID) 20 mg in sodium chloride 0.9% 10 mL injection  20 mg IntraVENous Q24H       Labs:    Recent Labs     10/09/19  0250 10/08/19  2348 10/08/19  1200   WBC 85.4*  --  64.7*   HGB 6.5*  --  8.1*   HCT 19.2*  --  23.8*   PLT 23* 25* 7*     Recent Labs     10/09/19  0250 10/08/19  1200    141   K 3.2* 4.0    105   CO2 27 26   GLU 97 130*   BUN 26* 30*   CREA 1.02 1.31*   CA 8.5 9.9         Signed By: David Weber MD     October 9, 2019

## 2019-10-09 NOTE — PROGRESS NOTES
Per Dr. Mirna Jackson, call Oncology re\" pt WBC and platelet count. Spoke to Dr. Gina Kline re: platelet count of 02B and WBC 85.4, will see pt later in a.m.

## 2019-10-09 NOTE — PROGRESS NOTES
Problem: Hypertension  Goal: *Blood pressure within specified parameters  Outcome: Progressing Towards Goal

## 2019-10-09 NOTE — CDMP QUERY
Documentation of Oral Rosario is noted in the H&P. Currently the patient does not meet RIFLE criteria (BSV approved) to support this diagnosis. If you are using another criteria to support this diagnosis, please document this in your progress note. Otherwise, please document in the progress notes the clinical indicators that support this diagnosis or state that the diagnosis has been ruled out. BUN/CR on admission:   30 and 1.31  with current values down to 27 and 1.02 
 
RIFLE  (BSV Approved) RISK:  Increased SCr x 1.5 or GFR decrease > 25% (within 7 days) INJURY:  Increased SCr x 2.0 or GFR decreased > 50% FAILURE:  Increased SCr x 3.0 or GFR decrease > 75% or SCr >4.0 mg/dL or acute increase >0.5 mg/dL LOSS:  Persistent acute renal failure = complete loss of kidney function > 4 weeks END STAGE:  End stage of kidney disease > 3 months Thank you, Billy Reed RN/CCDS 
[phone]

## 2019-10-09 NOTE — ROUTINE PROCESS
Bedside and Verbal shift change report given to Carlin Mckenzie RN and Emmanuel Pozo RN (oncoming nurse) by Soren Arora (offgoing nurse). Report included the following information SBAR, Kardex, ED Summary, Procedure Summary, MAR, Recent Results and Cardiac Rhythm sinus rhythm.

## 2019-10-09 NOTE — CONSULTS
Hematology / Oncology Consult Note      Reason for Consultation:  Tahir Garcia is a 70 y.o. male who I've been asked to consult for assistance with the management of the patients Anemia and thrombocytopenia in the setting of his Hx of prostate cancer. Subjective:     HPI:    Tahir Garica is a 70 y.o. male who presents with fatigue and weakness. Weight loss 6 lbs in 2 months. Difficulty with swallowing and eating for 3-4 days secondary to swollen glands and sore throat. Diagnosed with gingivitis and taking PO antibiotics ( Clindamycin) since 9/30/19. Was evaluated at [facility] on 10/6/19 rapid strep negative and negative fungal swab. Platelets at that time 9,000. History of prostate cancer with last radiation therapy mid March 2019, Nuprene injection June 2019 and last PSA  September 2019. No oncologist. Urology following up with prostate cancer. In ED platelet count 3,750, leukocytosis 64.7. Pt was transfused 1 unit of platelets. Today platelet count is 80,285. On admission WBC 64,7m Hgb 8.1, Hct 23.8, ANC 0.0  Sodium 141, Potassium 4.0, Cl 105, Glucose 130, BUN 30, Creatinine 1.31      Pt denies bleeding, abd pain or shortness of breath. Review of Systems:   Constitutional:  Positive for fever,fatigue, generalized weakness. HENT:Positive for mouth sores, trouble swallowing. Eyes: Negative for photophobia, pain, discharge and itching. Respiratory: Negative for apnea, cough, choking, chest tightness, wheezing and stridor. Cardiovascular: Negative for chest pain, palpitations and leg swelling. Gastrointestinal: Negative for abdominal pain. Negative for nausea, diarrhea, constipation, blood in stool and rectal pain. Genitourinary: Negative for dysuria, urgency, hematuria, flank pain and difficulty urinating. Musculoskeletal: Negative for back pain, joint swelling, arthralgias and gait problem. Skin: Negative for color change, pallor, rash and wound.    Neurological:  Negative for dizziness, facial asymmetry, speech difficulty, light-headedness and headaches. Hematological: Negative for adenopathy. Does not bruise/bleed easily. Psychiatric/Behavioral: Negative for hallucinations, confusion, disturbed wake/sleep cycle and agitation. Physical Assessment:     Visit Vitals  /81 (BP 1 Location: Left arm, BP Patient Position: At rest)   Pulse 83   Temp 98.8 °F (37.1 °C)   Resp 20   Ht 5' 9\" (1.753 m)   Wt 71.7 kg (158 lb)   SpO2 94%   BMI 23.33 kg/m²       Temp (24hrs), Av.2 °F (37.3 °C), Min:98.3 °F (36.8 °C), Max:100.4 °F (38 °C)      Physical Exam:    General: Appears comfortable, mild distress noted. HEENT:  Anicteric sclerae; pink palpebral conjunctivae; mucosa moist  Resp:  Symmetrical chest expansion, no accessory muscle use; good airway entry; no rales/ wheezing/ rhonchi noted  CV:  S1, S2 present; regular rate and rhythm  GI:  Abdomen soft, non-tender; (+) active bowel sounds  Extremities:  +2 pulses on all extremities; no edema/ cyanosis/ clubbing noted  Skin:  Warm; no rashes/ lesions noted  Neurologic:  Non-focal      Assessment:   · Anemia  · Thrombocytopenia  · Hx prostate Ca      Plan:   · H/H is 6.5.2 I will order 2 units of PRBC for today, I will order Iron panel and Ferritin; Platelet count is 12,331 will start Solumedrol 30 mg BID IV ( difficulty swallow). · ANC 1.7, we have considered Granix 300mcg sq daily but will hold off for now- His WBC 85.4. · PSA <0.1 on 9/3/19. Pathology report from peripheral smear 10/8/19 demonstrates numerous immature cells with features consistent with blasts. We will order flow cytometry at this time. MDS vs ITP? · CT Abd pending for today. · Continue Nystatin swish and swallow. · I have discussed with pt the plan of care and have answered all questions asked. I have also notified           Dr. Nadia Potter office of the patients' admission.       Past Medical History:   Diagnosis Date    Hypertension     Melanoma of skin ([facility] [address]     Nausea & vomiting     Prostate CA ([facility] [address] 2018     Past Surgical History:   Procedure Laterality Date    HX OTHER SURGICAL      melanoma's removed x8 per patient    HX TONSILLECTOMY      child    HX VASECTOMY         Family History   Problem Relation Age of Onset    Cancer Sister         breast     Allergies   Allergen Reactions    Sulfa (Sulfonamide Antibiotics) Other (comments)     As a child told by mother. Does not know reaction. Home Medications:   Home Meds reviewed with patient    Hospital Medications:   Current hospital medications reviewed    Labs:  Recent Labs     10/09/19  0250 10/08/19  1200   WBC 85.4* 64.7*   RBC 2.12* 2.65*   HCT 19.2* 23.8*   MCV 90.6 89.8   MCH 30.7 30.6   MCHC 33.9 34.0   RDW 15.6* 15.5*       Recent Labs     10/09/19  0250 10/08/19  1200   CO2 27 26   BUN 26* 30*       No results for input(s): ALBUMIN in the last 72 hours. No lab exists for component: [facility]    Thank you for requesting us to consult on your patient. We appreciate the opportunity to participate in your patient's care. Please call if you have questions.       Yesenia Mejia NP

## 2019-10-09 NOTE — PROGRESS NOTES
OT order received and chart reviewed. Attempted to see patient for skilled OT at [address] MD in room currently having a discussion with patient. Will continue to follow and see patient when available/as appropriate. Second attempt to see patient at [phone], pt currently off unit. Will continue to follow and see patient when available/as appropriate.             Thank you for the referral,  Danielle Lee MS, OTR/L

## 2019-10-09 NOTE — PROGRESS NOTES
Pt c/o pain in mouth and throat and states he is having trouble swallowing. Does not want tylenol because he says he cannot swallow. Will notify MD and continue to monitor.

## 2019-10-09 NOTE — ROUTINE PROCESS
Bedside and Verbal shift change report given to Master Lowe RN (oncoming nurse) by Yesenia Alamo RN (offgoing nurse). Report included the following information SBAR, Kardex, ED Summary, Procedure Summary, MAR, Recent Results and Cardiac Rhythm sinus rhythm.

## 2019-10-09 NOTE — PROGRESS NOTES
NUTRITION    Nutrition Consult: General Nutrition Management & Supplements     RECOMMENDATIONS / PLAN:     - Monitor diet tolerance and readiness for diet advancement. - Add supplements: Ensure Enlive, TID.  - Add daily liquid multivitamin  - Continue RD inpatient monitoring and evaluation. NUTRITION INTERVENTIONS & DIAGNOSIS:     - Meals/snacks: modify composition  - Medical food supplement therapy: initiate  - IV fluids: NS at 100 mL/hr  - Vitamin and mineral supplement therapy: add MVI    Nutrition Diagnosis: Acute disease or condition related malnutrition related to catabolic illness as evidenced by inadequate energy intake with muscle loss in multiple body regions. Patient meets criteria for Moderate Protein Calorie Malnutrition as evidenced by:   ASPEN Malnutrition Criteria  Acute Illness, Chronic Illness, or Social/Enviornmental: Acute illness  Energy Intake: Less than/equal to 50% est energy req for greater than/equal to 5 days  Muscle Mass Loss: Mild(temple & clavicle bone regions)  ASPEN Malnutrition Score - Acute Illness: 7  Acute Illness - Malnutrition Diagnosis: Moderate malnutrition. ASSESSMENT:     Hx of prostate cancer, last radiation March 2019. Now with increased throat pain and difficulty swallowing. Only able to tolerate liquids for the past few weeks with decreased intake and weight loss. Since admission pt consuming some broth, on full liquid diet. Agreeable to supplements. MRI of face/neck today. Limited NFPE completed.      Nutritional intake adequate to meet patients estimated nutritional needs:  No    Diet: DIET FULL LIQUID      Food Allergies: NKFA  Current Appetite: Fair  Appetite/meal intake prior to admission: Poor x months, worse over the past 2 weeks (only consuming a few liquids)  Feeding Limitations:  [x] Swallowing difficulty: SLP consult pending    [] Chewing difficulty    [x] Other: throat pain/throat infection, swollen glands  Current Meal Intake:   Patient Vitals for the past 100 hrs:   % Diet Eaten   10/09/19 1004 100 %       BM: 10/9  Skin Integrity: WDL  Edema:   [x] No     [] Yes   Pertinent Medications: Reviewed: colace, pepcid, steroid, zofran, miralax, 30 mEq KCL    Recent Labs     10/09/19  0250 10/08/19  1200    141   K 3.2* 4.0    105   CO2 27 26   GLU 97 130*   BUN 26* 30*   CREA 1.02 1.31*   CA 8.5 9.9       Intake/Output Summary (Last 24 hours) at 10/9/2019 1326  Last data filed at 10/9/2019 1004  Gross per 24 hour   Intake 1300 ml   Output 200 ml   Net 1100 ml       Anthropometrics:  Ht Readings from Last 1 Encounters:   10/08/19 5' 9\" (1.753 m)     Last 3 Recorded Weights in this Encounter    10/08/19 1154 10/08/19 2108 10/09/19 0146   Weight: 68.9 kg (152 lb) 71.7 kg (158 lb) 71.7 kg (158 lb)     Body mass index is 23.33 kg/m². Weight History: Pt reports -8 lbs x 2 weeks PTA. Per chart - 9 lbs, 5.4% x 3-4 months PTA. Weight Metrics 10/9/2019 6/17/2019 12/21/2018 12/17/2018 11/5/2018 10/17/2018 10/10/2018   Weight 158 lb 167 lb 173 lb 4 oz 174 lb 167 lb 167 lb 167 lb   BMI 23.33 kg/m2 24.66 kg/m2 25.58 kg/m2 25.7 kg/m2 24.66 kg/m2 24.66 kg/m2 24.66 kg/m2        Admitting Diagnosis: Thrombocytopenia ([facility] [address] [D69.6]  Pertinent PMHx: HTN, prostate cancer, melanoma of skin    Education Needs:        [x] None identified  [] Identified - Not appropriate at this time  []  Identified and addressed - refer to education log  Learning Limitations:   [x] None identified  [] Identified    Cultural, Yazidi & ethnic food preferences:  [x] None identified    [] Identified and addressed     ESTIMATED NUTRITION NEEDS:     Calories: 1513-3567 kcal (HBEx1.2-1.5) based on  [x] Actual BW: 72 kg      [] IBW   Protein: 72-86 gm (1-1.2 gm/kg) based on  [x] Actual BW      [] IBW   Fluid: 1 mL/kcal     MONITORING & EVALUATION:     Nutrition Goal(s):   - PO nutrition intake will meet >75% of patient estimated nutritional needs within the next 7 days.    Outcome: New/Initial goal     Monitoring:   [x] Food and beverage intake   [x] Diet order   [x] Nutrition-focused physical findings   [x] Treatment/therapy   [] Weight   [] Enteral nutrition intake    Previous Recommendations (for follow-up assessments only):  Not Applicable     Discharge Planning: oral diet as tolerated + Ensure Enlive or comparable supplement 2-3x daily  [x] Participated in care planning, discharge planning, & interdisciplinary rounds as appropriate      Francisco Hodgson RD   Pager: [phone]

## 2019-10-10 ENCOUNTER — APPOINTMENT (OUTPATIENT)
Dept: MRI IMAGING | Age: 79
DRG: 835 | End: 2019-10-10
Attending: INTERNAL MEDICINE
Payer: MEDICARE

## 2019-10-10 LAB
ANION GAP SERPL CALC-SCNC: 9 MMOL/L (ref 3–18)
APTT PPP: 26.2 SEC (ref 23–36.4)
BASOPHILS # BLD: 0 K/UL (ref 0–0.06)
BASOPHILS NFR BLD: 0 % (ref 0–3)
BLASTS NFR BLD MANUAL: 80 %
BUN SERPL-MCNC: 20 MG/DL (ref 7–18)
BUN/CREAT SERPL: 22 (ref 12–20)
CALCIUM SERPL-MCNC: 8.2 MG/DL (ref 8.5–10.1)
CHLORIDE SERPL-SCNC: 105 MMOL/L (ref 100–111)
CO2 SERPL-SCNC: 22 MMOL/L (ref 21–32)
CREAT SERPL-MCNC: 0.91 MG/DL (ref 0.6–1.3)
DIFFERENTIAL METHOD BLD: ABNORMAL
EOSINOPHIL # BLD: 0 K/UL (ref 0–0.4)
EOSINOPHIL NFR BLD: 0 % (ref 0–5)
ERYTHROCYTE [DISTWIDTH] IN BLOOD BY AUTOMATED COUNT: 15.3 % (ref 11.6–14.5)
GLUCOSE SERPL-MCNC: 182 MG/DL (ref 74–99)
HCT VFR BLD AUTO: 23.7 % (ref 36–48)
HGB BLD-MCNC: 8 G/DL (ref 13–16)
INR PPP: 1.2 (ref 0.8–1.2)
LYMPHOCYTES # BLD: 3 K/UL (ref 0.8–3.5)
LYMPHOCYTES NFR BLD: 14 % (ref 20–51)
MCH RBC QN AUTO: 30.3 PG (ref 24–34)
MCHC RBC AUTO-ENTMCNC: 33.8 G/DL (ref 31–37)
MCV RBC AUTO: 89.8 FL (ref 74–97)
MONOCYTES # BLD: 0.6 K/UL (ref 0–1)
MONOCYTES NFR BLD: 3 % (ref 2–9)
NEUTS BAND NFR BLD MANUAL: 1 % (ref 0–5)
NEUTS SEG # BLD: 0.6 K/UL (ref 1.8–8)
NEUTS SEG NFR BLD: 2 % (ref 42–75)
PLATELET # BLD AUTO: 22 K/UL (ref 135–420)
PLATELET COMMENTS,PCOM: ABNORMAL
PMV BLD AUTO: 10.5 FL (ref 9.2–11.8)
POTASSIUM SERPL-SCNC: 3.7 MMOL/L (ref 3.5–5.5)
PROTHROMBIN TIME: 15.4 SEC (ref 11.5–15.2)
RBC # BLD AUTO: 2.64 M/UL (ref 4.7–5.5)
RBC MORPH BLD: ABNORMAL
SODIUM SERPL-SCNC: 136 MMOL/L (ref 136–145)
WBC # BLD AUTO: 21.6 K/UL (ref 4.6–13.2)
WBC MORPH BLD: ABNORMAL

## 2019-10-10 PROCEDURE — 74011250636 HC RX REV CODE- 250/636: Performed by: NURSE PRACTITIONER

## 2019-10-10 PROCEDURE — 65660000000 HC RM CCU STEPDOWN

## 2019-10-10 PROCEDURE — 74011250637 HC RX REV CODE- 250/637: Performed by: INTERNAL MEDICINE

## 2019-10-10 PROCEDURE — 85730 THROMBOPLASTIN TIME PARTIAL: CPT

## 2019-10-10 PROCEDURE — 80048 BASIC METABOLIC PNL TOTAL CA: CPT

## 2019-10-10 PROCEDURE — 36415 COLL VENOUS BLD VENIPUNCTURE: CPT

## 2019-10-10 PROCEDURE — 97165 OT EVAL LOW COMPLEX 30 MIN: CPT

## 2019-10-10 PROCEDURE — 85025 COMPLETE CBC W/AUTO DIFF WBC: CPT

## 2019-10-10 PROCEDURE — 74011250636 HC RX REV CODE- 250/636: Performed by: INTERNAL MEDICINE

## 2019-10-10 PROCEDURE — 85610 PROTHROMBIN TIME: CPT

## 2019-10-10 PROCEDURE — 74011000250 HC RX REV CODE- 250: Performed by: NURSE PRACTITIONER

## 2019-10-10 PROCEDURE — 97161 PT EVAL LOW COMPLEX 20 MIN: CPT

## 2019-10-10 PROCEDURE — 70540 MRI ORBIT/FACE/NECK W/O DYE: CPT

## 2019-10-10 RX ORDER — DIPHENHYDRAMINE HYDROCHLORIDE 50 MG/ML
25 INJECTION, SOLUTION INTRAMUSCULAR; INTRAVENOUS
Status: COMPLETED | OUTPATIENT
Start: 2019-10-10 | End: 2019-10-10

## 2019-10-10 RX ORDER — HALOPERIDOL 5 MG/ML
5 INJECTION INTRAMUSCULAR ONCE
Status: COMPLETED | OUTPATIENT
Start: 2019-10-10 | End: 2019-10-10

## 2019-10-10 RX ADMIN — METHYLPREDNISOLONE SODIUM SUCCINATE 30 MG: 40 INJECTION, POWDER, FOR SOLUTION INTRAMUSCULAR; INTRAVENOUS at 20:32

## 2019-10-10 RX ADMIN — Medication 30 ML: at 09:04

## 2019-10-10 RX ADMIN — FLUCONAZOLE 200 MG: 200 TABLET ORAL at 09:03

## 2019-10-10 RX ADMIN — METHYLPREDNISOLONE SODIUM SUCCINATE 30 MG: 40 INJECTION, POWDER, FOR SOLUTION INTRAMUSCULAR; INTRAVENOUS at 09:04

## 2019-10-10 RX ADMIN — FAMOTIDINE: 10 INJECTION INTRAVENOUS at 09:04

## 2019-10-10 RX ADMIN — FAMOTIDINE 20 MG: 10 INJECTION INTRAVENOUS at 20:32

## 2019-10-10 RX ADMIN — DIPHENHYDRAMINE HYDROCHLORIDE 25 MG: 50 INJECTION INTRAMUSCULAR; INTRAVENOUS at 17:21

## 2019-10-10 RX ADMIN — HALOPERIDOL LACTATE 5 MG: 5 INJECTION INTRAMUSCULAR at 17:21

## 2019-10-10 RX ADMIN — ACETAMINOPHEN 650 MG: 650 SOLUTION ORAL at 15:21

## 2019-10-10 NOTE — PROGRESS NOTES
Problem: Self Care Deficits Care Plan (Adult)  Goal: *Acute Goals and Plan of Care (Insert Text)  Outcome: Resolved/Met     OCCUPATIONAL THERAPY EVALUATION/DISCHARGE    Patient: Honey Garcia Jr. (79 y.o. male)  Date: 10/10/2019  Primary Diagnosis: Thrombocytopenia ([facility] [D69.6]  Precautions: (standard)  PLOF: Patient was independent with self-care and functional mobility PTA. ASSESSMENT AND RECOMMENDATIONS:  Pt cleared to participate in OT evaluation by RN. Upon entering the room, pt was supine in bed, alert, and agreeable to participate in OT evaluation. Pt was seen with PT to maximize pt safety and participation. Patient is independent with basic self-care seated and standing, independent with bed mobility and independent with functional transfers using no AD. Based on the objective data described below, the patient presents with no deficits that impede pt function with ADLs, functional transfers, and functional mobility. At this time patient is safe to d/c home with family support. OT to d/c from caseload at this time. Skilled occupational therapy is not indicated at this time.   Discharge Recommendations: None  Further Equipment Recommendations for Discharge: N/A      SUBJECTIVE:   Patient stated my wife is home with me    OBJECTIVE DATA SUMMARY:     Past Medical History:   Diagnosis Date    Hypertension     Melanoma of skin ([facility]     Nausea & vomiting     Prostate CA ([facility] 2018     Past Surgical History:   Procedure Laterality Date    HX OTHER SURGICAL      melanoma's removed x8 per patient    HX TONSILLECTOMY      child    HX VASECTOMY       Barriers to Learning/Limitations: None  Compensate with: visual, verbal, tactile, kinesthetic cues/model    Home Situation:   Home Situation  Home Environment: Private residence  # Steps to Enter: 3  Rails to Enter: No  One/Two Story Residence: One story  Living Alone: No  Support Systems: Family member(s)  Patient Expects to be Discharged to[de-identified] Private residence  Current DME Used/Available at Home: Christine Deacon, straight, Walker, rolling  Tub or Shower Type: Shower  ? Right hand dominant   ? Left hand dominant    Cognitive/Behavioral Status:  Neurologic State: Alert  Orientation Level: Oriented to time  Cognition: Follows commands  Safety/Judgement: Fall prevention; Awareness of environment    Skin: Intact  Edema: None noted    Vision/Perceptual:    Acuity: Within Defined Limits      Coordination: BUE  Coordination: Within functional limits  Fine Motor Skills-Upper: Left Intact; Right Intact    Gross Motor Skills-Upper: Left Intact; Right Intact    Balance:  Sitting: Intact  Standing: Intact    Strength: BUE  Strength: Within functional limits    Tone & Sensation: BUE  Tone: Normal  Sensation: Intact    Range of Motion: BUE  AROM: Within functional limits      Functional Mobility and Transfers for ADLs:  Bed Mobility:  Rolling: Independent  Supine to Sit: Independent  Sit to Supine: Independent  Scooting: Independent    Transfers:  Sit to Stand: Independent  Stand to Sit: Independent      ADL Assessment:  Feeding: Independent    Oral Facial Hygiene/Grooming: Independent    Bathing: Independent     Upper Body Dressing: Independent    Lower Body Dressing: Independent    Toileting: Independent    ADL Intervention:  Upper Body Dressing Assistance  Dressing Assistance: Independent  Shirt simulation with hospital gown: Independent    Lower Body Dressing Assistance  Dressing Assistance: Independent  Socks: Independent  Leg Crossed Method Used: Yes  Position Performed: Seated edge of bed    Cognitive Retraining  Safety/Judgement: Fall prevention; Awareness of environment    Pain:  Pain level pre-treatment: 4/10, throat  Pain level post-treatment: 4/10, throat  Pain Intervention(s): Medication (see MAR); Response to intervention:  See doc flow    Activity Tolerance:   Patient demonstrated good activity tolerance during OT evaluation.      Please refer to the flowsheet for vital signs taken during this treatment. After treatment:   ?  Patient left in no apparent distress sitting up in chair  ? Patient left in no apparent distress in bed  ? Call bell left within reach  ? Nursing notified  ? Caregiver present  ? Bed alarm activated    COMMUNICATION/EDUCATION:   ?      Role of Occupational Therapy in the acute care setting  ? Home safety education was provided and the patient/caregiver indicated understanding. ? Patient/family have participated as able and agree with findings and recommendations. ?      Patient is unable to participate in plan of care at this time. Thank you for this referral.  Dontrell Lopez OTR/L  Time Calculation: 13 mins      Eval Complexity: History: LOW Complexity : Brief history review ; Examination: LOW Complexity : 1-3 performance deficits relating to physical, cognitive , or psychosocial skils that result in activity limitations and / or participation restrictions ;    Decision Making:LOW Complexity : No comorbidities that affect functional and no verbal or physical assistance needed to complete eval tasks

## 2019-10-10 NOTE — CDMP QUERY
Patient admitted with severe throat infection history , failed out patient antibiotics , here in hospital patient has severe leucocytosis , Anemia and low Plt. If possible, please document in progress notes and d/c summary if you are evaluating and/or treating any of the following: 
 
 
    >Moderate Protein Calorie Malnutrition >Other Explanation of clinical findings >Clinically Undetermined (no explanation for clinical findings) The medical record reflects the following: 
 
   Risk Factors: Hx prostate Ca, Now with increased throat pain and difficulty swallowing. Only able to tolerate liquids for the past few weeks with decreased intake and weight loss. Clinical Indicators: Energy Intake: Less than/equal to 50% est energy req for greater than/equal to 5 days Muscle Mass Loss: Mild(temple & clavicle bone regions) Treatment: - Meals/snacks: NPO 
- Medical food supplement therapy: Ensure Enlive, TID- held - IV fluids: NS at 100 mL/hr 
- Vitamin and mineral supplement therapy: liquid MVI Thank you, [address] [facility] [place],[address], [address]

## 2019-10-10 NOTE — PROGRESS NOTES
Hematology/Medical Oncology Progress Note             Name: Kirk Dear. : 1940   MRN: [id number]   Date: 10/10/2019 7:40 AM     [x]I have reviewed the flowsheet and previous days notes. Events overnight reviewed and discussed with nursing staff. Vital signs and records reviewed. Peola Sicard a 70 y. o. male who presents with fatigue and weakness. Weight loss 6 lbs in 2 months. Difficulty with swallowing and eating for 3-4 days secondary to swollen glands and sore throat. Diagnosed with gingivitis and taking PO antibiotics ( Clindamycin) since 19. Was evaluated at [facility]" on 10/6/19 rapid strep negative and negative fungal swab. Platelets at that time 9,000. History of prostate cancer with last radiation therapy mid 2019, Nuprene injection 2019 and last PSA  2019. No oncologist. Urology following up with prostate cancer. CT abd 10/9/19    IMPRESSION:             1.  Left lower lobe nodular structure, waxing and waning in size. Overall  appearance appears similar to prior studies. Query round atelectasis vs. benign  pleura-based nodule. Recommend continued surveillance.      2. Interval development of multiple axillary, mediastinal, abdominal and pelvic  nodes. Of these, groin nodes may be most readily amenable to tissue sampling. The most prominent isolated node is identified adjacent to the stomach. This  node may be amenable to EUS.       3. Irregular appearance of the gallbladder. Ultrasound correlation would be  helpful for further delineation.     4.  Prostate enlargement. S/p brachytherapy. Still tolerating full liquid diets. ROS:   Constitutional:  Positive for fever,fatigue, generalized weakness. HENT:Positive for mouth sores, trouble swallowing solids,neck swelling. Eyes: Negative for photophobia, pain, discharge and itching.    Respiratory: Negative for apnea, cough, choking, chest tightness, wheezing and stridor. Cardiovascular: Negative for chest pain, palpitations and leg swelling. Gastrointestinal: Negative for abdominal pain. Negative for nausea, diarrhea, constipation, blood in stool and rectal pain. Genitourinary: Negative for dysuria, urgency, hematuria, flank pain and difficulty urinating. Musculoskeletal: Negative for back pain, joint swelling, arthralgias and gait problem. Skin: Negative for color change, pallor, rash and wound. Neurological:  Negative for dizziness, facial asymmetry, speech difficulty, light-headedness and headaches. Hematological: Negative for adenopathy. Does not bruise/bleed easily. Psychiatric/Behavioral: Negative for hallucinations, confusion, disturbed wake/sleep cycle and agitation. Vital Signs:    Visit Vitals  /87 (BP Patient Position: Sitting)   Pulse 91   Temp 96.8 °F (36 °C)   Resp 18   Ht 5' 9\" (1.753 m)   Wt 71.7 kg (158 lb)   SpO2 100%   BMI 23.33 kg/m²       O2 Device: Room air       Temp (24hrs), Av.1 °F (36.7 °C), Min:96.8 °F (36 °C), Max:99.1 °F (37.3 °C)       Intake/Output:   Last shift:      No intake/output data recorded. Last 3 shifts: 10/08 1901 - 10/10 0700  In: 3128.3 [P.O.:600; I.V.:2200]  Out: 900 [Urine:900]    Intake/Output Summary (Last 24 hours) at 10/10/2019 0740  Last data filed at 10/9/2019 2345  Gross per 24 hour   Intake 2128.3 ml   Output 700 ml   Net 1428.3 ml       Physical Exam:  General: Appears comfortable, mild distress noted.   HEENT:  Anicteric sclerae; pink palpebral conjunctivae; mucosa moist. Anterior Cervical lymphadenopathy  Resp:  Symmetrical chest expansion, no accessory muscle use; good airway entry; no rales/ wheezing/ rhonchi noted  CV:  S1, S2 present; regular rate and rhythm  GI:  Abdomen soft, non-tender; (+) active bowel sounds  Extremities:  +2 pulses on all extremities; no edema/ cyanosis/ clubbing noted  Skin:  Warm; no rashes/ lesions noted  Neurologic:  Non-focal       DATA:   Current Facility-Administered Medications   Medication Dose Route Frequency    methylPREDNISolone (PF) (SOLU-MEDROL) injection 30 mg  30 mg IntraVENous Q12H    0.9% sodium chloride infusion 250 mL  250 mL IntraVENous PRN    famotidine (PF) (PEPCID) 20 mg in sodium chloride 0.9% 10 mL injection  20 mg IntraVENous T03W    multivit-folic acid-herbal 781 (WELLESSE PLUS) oral liquid 30 mL  30 mL Oral DAILY    fluconazole (DIFLUCAN) tablet 200 mg  200 mg Oral DAILY    acetaminophen (TYLENOL) solution 650 mg  650 mg Oral Q4H PRN    0.9% sodium chloride infusion 250 mL  250 mL IntraVENous PRN    0.9% sodium chloride infusion 250 mL  250 mL IntraVENous PRN    ondansetron (ZOFRAN) injection 4 mg  4 mg IntraVENous Q4H PRN    polyethylene glycol (MIRALAX) packet 17 g  17 g Oral DAILY    docusate sodium (COLACE) capsule 100 mg  100 mg Oral DAILY    0.9% sodium chloride infusion  100 mL/hr IntraVENous CONTINUOUS                    Labs:  Recent Labs     10/09/19  2138 10/09/19  1006 10/09/19  0250 10/08/19  2348 10/08/19  1200   WBC  --  61.9* 85.4*  --  64.7*   HGB 8.1* 7.1* 6.5*  --  8.1*   HCT 23.8* 21.2* 19.2*  --  23.8*   PLT  --  26* 23* 25* 7*     Recent Labs     10/10/19  0500 10/09/19  0250 10/08/19  1200    138 141   K 3.7 3.2* 4.0    105 105   CO2 22 27 26   * 97 130*   BUN 20* 26* 30*   CREA 0.91 1.02 1.31*   CA 8.2* 8.5 9.9     No results for input(s): PH, PCO2, PO2, HCO3, FIO2 in the last 72 hours. IMPRESSION:   · Anemia  · Thrombocytopenia  · Hx prostate Ca        PLAN:   · H/H is  8.0/23.7  s/p 2 units of PRBC Iron is 118, TIBC 151, Iron % sat 70, Ferritin is 745. Platelet count today is  22,000 continue Solumedrol 30 mg BID IV (  No po 2/2 dsyphagia). · [address]  pending , His WBC  21.6  · PSA <0.1 on 9/3/19. Pathology report from peripheral smear 10/8/19 demonstrates numerous immature cells with features consistent with blasts. Flow cytometry results are pending.   · CT Abd results above: Liver, Spleen, Pancreas:Unremarkable. Interval development of multiple axillary,mediastinal, abdominal and pelvic nodes. See above for complete results. I will consult with IR for Bone Marrow bx. · Continue Nystatin swish and swallow. · MRI ORB Face- pending. Pt has claustrophobia and apparently did not respond well to pre-procedure ativan on yesterday.   · I have discussed Bone Marrow Bx with patient and answered all questions asked.    ·        The patient is: [x] acutely ill Risk of deterioration: [] moderate    [] critically ill  [] high         My assessment/plan was discussed with:  [x]nursing []PT/OT    []respiratory therapy [x]Dr.Lloyd An Gandara MD      []family []       Maru Loving NP

## 2019-10-10 NOTE — PROGRESS NOTES
Infectious Disease  Note    Requested by: dr. Karen Bautista    Reason: evaluate for gingivitis, throat infection, leukocytosis    Current abx Prior abx   Fluconazole since 10/9/19      Lines:       Assessment :    70 y. o. male with h/o prostate cancer presented to ed on 10/8/19 with fatigue and weakness. Now with difficulty swallowing, gingival hyperplasia, significant leukocytosis, anemia, thrombocytopenia. Highly complex clinical picture. Dysphagia could be due to candidiasis, cervical lymphadenopathy. Gingival hyperplasia could be related to the systemic illness/?hematological malignancy (profound leukocytosis in absence of definitive infection likely suggest this). Hematology consult appreciated. Decreased wbc off abx. Improved dysphagia. CT c/a/p 10/9 suggestive of generalized lymphadenopathy. Recommendations:    1. cont fluconazole. 2. F/u MRI face/neck to rule out dental abscess  3. Monitor cbc, clinically  4. F/u hematology recommendations regarding bone marrow biopsy,further w/u     Advance Care planning:full code: discussed  with patient/surrogate decision maker; nikki carpenter: [phone]  Above plan was discussed in details with patient, and dr Charlene Santiago. Please call me if any further questions or concerns. Will continue to participate in the care of this patient. HPI:    Feels better. Improved ability to swallow. Patient denies fever, headaches, visual disturbances, runny nose, earaches, cp, sob, chills, cough, abdominal pain, diarrhea, burning micturition, pain or weakness in extremities. He denies back pain/flank pain. home Medication List    Details   clindamycin (CLEOCIN) 150 mg capsule Take 150 mg by mouth every six (6) hours. bicalutamide (CASODEX) 50 mg tablet Take 1 Tab by mouth daily. Qty: 90 Tab, Refills: 3    Associated Diagnoses: Prostate cancer ([facility]      lisinopril-hydroCHLOROthiazide (PRINZIDE, ZESTORETIC) 10-12.5 mg per tablet Take 1 Tab by mouth daily.   Qty: 30 Tab, Refills: 0             Current Facility-Administered Medications   Medication Dose Route Frequency    diphenhydrAMINE (BENADRYL) injection 25 mg  25 mg IntraVENous ONCE PRN    haloperidol lactate (HALDOL) injection 5 mg  5 mg IntraMUSCular ONCE    methylPREDNISolone (PF) (SOLU-MEDROL) injection 30 mg  30 mg IntraVENous Q12H    0.9% sodium chloride infusion 250 mL  250 mL IntraVENous PRN    famotidine (PF) (PEPCID) 20 mg in sodium chloride 0.9% 10 mL injection  20 mg IntraVENous P61K    multivit-folic acid-herbal 626 (WELLESSE PLUS) oral liquid 30 mL  30 mL Oral DAILY    fluconazole (DIFLUCAN) tablet 200 mg  200 mg Oral DAILY    acetaminophen (TYLENOL) solution 650 mg  650 mg Oral Q4H PRN    ondansetron (ZOFRAN) injection 4 mg  4 mg IntraVENous Q4H PRN    polyethylene glycol (MIRALAX) packet 17 g  17 g Oral DAILY    docusate sodium (COLACE) capsule 100 mg  100 mg Oral DAILY    0.9% sodium chloride infusion  100 mL/hr IntraVENous CONTINUOUS       Allergies: Sulfa (sulfonamide antibiotics)    Temp (24hrs), Av °F (36.7 °C), Min:96.8 °F (36 °C), Max:99.1 °F (37.3 °C)    Visit Vitals  /81 (BP Patient Position: Sitting)   Pulse 88   Temp 97.5 °F (36.4 °C)   Resp 18   Ht 5' 9\" (1.753 m)   Wt 74 kg (163 lb 3.2 oz)   SpO2 96%   BMI 24.10 kg/m²       ROS: 12 point ROS obtained in details. Pertinent positives as mentioned in HPI,   otherwise negative    Physical Exam:    Constitutional: He is oriented to person, place, and time. He appears well-developed and well-nourished. HENT:   Head: Normocephalic and atraumatic. Swollen gum. Missing right upper tooth. No erythema of the gums. White patches on gums, posterior pharynx  Eyes: Right eye exhibits no discharge. Left eye exhibits no discharge. Neck: Normal range of motion. Neck supple. Cardiovascular: Normal rate, regular rhythm, normal heart sounds and intact distal pulses. No murmur heard.   Pulmonary/Chest: Effort normal and breath sounds normal. No stridor. No respiratory distress. He has no wheezes. He has no rales. He exhibits no tenderness. Abdominal: Soft. Bowel sounds are normal. He exhibits no distension. There is no tenderness. There is no rebound and no guarding. Musculoskeletal: Normal range of motion. Lymphadenopathy:     He has cervical lymphadenopathy, axillary, inguinal lymphadenopathy - non tender  Neurological: He is alert and oriented to person, place, and time. Skin: Skin is warm and dry. Psychiatric: He has a normal mood and affect. Nursing note and vitals reviewed.       Labs: Results:   Chemistry Recent Labs     10/10/19  0500 10/09/19  0250 10/08/19  1200   * 97 130*    138 141   K 3.7 3.2* 4.0    105 105   CO2 22 27 26   BUN 20* 26* 30*   CREA 0.91 1.02 1.31*   CA 8.2* 8.5 9.9   AGAP 9 6 10   BUCR 22* 25* 23*      CBC w/Diff Recent Labs     10/10/19  0500 10/09/19  2138 10/09/19  1006 10/09/19  0250   WBC 21.6*  --  61.9* 85.4*   RBC 2.64*  --  2.33* 2.12*   HGB 8.0* 8.1* 7.1* 6.5*   HCT 23.7* 23.8* 21.2* 19.2*   PLT 22*  --  26* 23*   GRANS 2*  --  3* 2*   LYMPH 14*  --  10* 10*   EOS 0  --  0 0      Microbiology No results for input(s): CULT in the last 72 hours.        RADIOLOGY:    All available imaging studies/reports in [facility] for this admission were reviewed    Dr. Willow Patino, Infectious Disease Specialist  [phone]  October 10, 2019  2:16 PM

## 2019-10-10 NOTE — ROUTINE PROCESS
Pt unable to tolerate MRI despite 1mg Ativan given prior to test. Dr. Armand Watkins paged for additional medication order. Opted for pt to redo test in the morning.

## 2019-10-10 NOTE — PROGRESS NOTES
Arrived to room to do vitals and found pt standing in the corner of the room with blood splattered on the floor from pulling out his IV. Pt disoriented to time and place and could not remember what happened. Reoriented patient and asked pt orientation questions 10 minutes later. Was able to answer all questions correctly. Pt apologetic to lapse in memory. New 22g IV placed in right hand.

## 2019-10-10 NOTE — ROUTINE PROCESS
Bedside shift change report received from Danii Aaron, [address] [place] [address]. Report included SBAR, Kardex, MAR, Recent Results, Summary of Care, and Cardiac Rhythm (Sinus). Pt in bed watching tv with call light in reach.

## 2019-10-10 NOTE — PROGRESS NOTES
NUTRITION    Nutrition Consult: General Nutrition Management & Supplements     RECOMMENDATIONS / PLAN:     - Monitor diet tolerance and readiness for diet resumption/advancement. - Continue RD inpatient monitoring and evaluation. NUTRITION INTERVENTIONS & DIAGNOSIS:     - Meals/snacks: NPO  - Medical food supplement therapy: Ensure Enlive, TID- held  - IV fluids: NS at 100 mL/hr  - Vitamin and mineral supplement therapy: liquid MVI    Nutrition Diagnosis: Acute disease or condition related malnutrition related to catabolic illness as evidenced by inadequate energy intake with muscle loss in multiple body regions. Patient meets criteria for Moderate Protein Calorie Malnutrition as evidenced by:   ASPEN Malnutrition Criteria  Acute Illness, Chronic Illness, or Social/Enviornmental: Acute illness  Energy Intake: Less than/equal to 50% est energy req for greater than/equal to 5 days  Muscle Mass Loss: Mild(temple & clavicle bone regions)  ASPEN Malnutrition Score - Acute Illness: 7  Acute Illness - Malnutrition Diagnosis: Moderate malnutrition. ASSESSMENT:     10/10: Pt with good intake of liquids and consuming supplements. Unable to do MRI yesterday, scheduled for today, made NPO after breakfast meal today. Noted pt disoriented overnight. 10/9: Hx of prostate cancer, last radiation March 2019. Now with increased throat pain and difficulty swallowing. Only able to tolerate liquids for the past few weeks with decreased intake and weight loss. Since admission pt consuming some broth, on full liquid diet. Agreeable to supplements. MRI of face/neck today. Limited NFPE completed.      Nutritional intake adequate to meet patients estimated nutritional needs:  Unable to determine at this time    Diet: DIET NUTRITIONAL SUPPLEMENTS Breakfast, Lunch, Dinner, All Meals; ENSURE ENLIVE  DIET NPO      Food Allergies: NKFA  Current Appetite: Good  Appetite/meal intake prior to admission: Poor x months, worse over the past 2 weeks (only consuming a few liquids)  Feeding Limitations:  [x] Swallowing difficulty: SLP following    [] Chewing difficulty    [x] Other: throat pain/throat infection, swollen glands  Current Meal Intake:   Patient Vitals for the past 100 hrs:   % Diet Eaten   10/10/19 0929 100 %   10/09/19 1435 75 %   10/09/19 1004 100 %       BM: 10/10- loose  Skin Integrity: WDL  Edema:   [x] No     [] Yes   Pertinent Medications: Reviewed: colace, pepcid, steroid, zofran, miralax    Recent Labs     10/10/19  0500 10/09/19  0250 10/08/19  1200    138 141   K 3.7 3.2* 4.0    105 105   CO2 22 27 26   * 97 130*   BUN 20* 26* 30*   CREA 0.91 1.02 1.31*   CA 8.2* 8.5 9.9       Intake/Output Summary (Last 24 hours) at 10/10/2019 1156  Last data filed at 10/10/2019 0929  Gross per 24 hour   Intake 3348.3 ml   Output 700 ml   Net 2648. 3 ml       Anthropometrics:  Ht Readings from Last 1 Encounters:   10/08/19 5' 9\" (1.753 m)     Last 3 Recorded Weights in this Encounter    10/08/19 2108 10/09/19 0146 10/10/19 0630   Weight: 71.7 kg (158 lb) 71.7 kg (158 lb) 74 kg (163 lb 3.2 oz)     Body mass index is 24.1 kg/m². Weight History: Pt reports -8 lbs x 2 weeks PTA. Per chart - 9 lbs, 5.4% x 3-4 months PTA. Weight Metrics 10/10/2019 6/17/2019 12/21/2018 12/17/2018 11/5/2018 10/17/2018 10/10/2018   Weight 163 lb 3.2 oz 167 lb 173 lb 4 oz 174 lb 167 lb 167 lb 167 lb   BMI 24.1 kg/m2 24.66 kg/m2 25.58 kg/m2 25.7 kg/m2 24.66 kg/m2 24.66 kg/m2 24.66 kg/m2        Admitting Diagnosis:  Thrombocytopenia ([address] [D69.6]  Pertinent PMHx: HTN, prostate cancer, melanoma of skin    Education Needs:        [x] None identified  [] Identified - Not appropriate at this time  []  Identified and addressed - refer to education log  Learning Limitations:   [x] None identified  [] Identified    Cultural, Advent & ethnic food preferences:  [x] None identified    [] Identified and addressed     ESTIMATED NUTRITION NEEDS: Calories: 8545-1139 kcal (HBEx1.2-1.5) based on  [x] Actual BW: 72 kg      [] IBW   Protein: 72-86 gm (1-1.2 gm/kg) based on  [x] Actual BW      [] IBW   Fluid: 1 mL/kcal     MONITORING & EVALUATION:     Nutrition Goal(s):   - PO nutrition intake will meet >75% of patient estimated nutritional needs within the next 7 days.    Outcome: Progressing towards goal     Monitoring:   [x] Food and beverage intake   [x] Diet order   [x] Nutrition-focused physical findings   [x] Treatment/therapy   [] Weight   [] Enteral nutrition intake    Previous Recommendations (for follow-up assessments only):  Not Applicable     Discharge Planning: oral diet as tolerated + Ensure Enlive or comparable supplement 2-3x daily  [x] Participated in care planning, discharge planning, & interdisciplinary rounds as appropriate      Shama Mistry RD   Pager: [phone]

## 2019-10-10 NOTE — PROGRESS NOTES
PHYSICAL THERAPY EVALUATION AND DISCHARGE    Patient: Hortensia Mckeon (79 y.o. male)  Date: 10/10/2019  Primary Diagnosis: Thrombocytopenia ([facility] [address] [D69.6]        Precautions:   (standard)    PLOF: Independent with mobility including gait no AD. ASSESSMENT :  PT orders received and patient cleared by nursing to participate with therapy. Patient is a 70 y.o. male admitted to the hospital due to fatigue and weakness. Patient consents to PT evaluation and treatment. Pt has functional B LE strength and good sitting/standing balance. Pt is independent and safe with bed mobility, transfers, and gait. Gait 250 feet no AD and no LOB. Pt does have narrow MEGHANN at times but no LOB. Pt able to march in place no HHA independently to clear steps. Pt reports he is at his baseline. Pt educated on safety while in the hospital and at home. Pt ended therapy supine in bed with all needs met. Patient does not require further skilled physical therapy at this level of care. PLAN :  Recommendations and Planned Interventions:    No skilled inpatient physical therapy needs identified at this time. Discharge Recommendations: None  Further Equipment Recommendations for Discharge: N/A     SUBJECTIVE:   Patient stated I feel good.     OBJECTIVE DATA SUMMARY:     Past Medical History:   Diagnosis Date    Hypertension     Melanoma of skin ([facility] [address]     Nausea & vomiting     Prostate CA ([facility] [address] 2018     Past Surgical History:   Procedure Laterality Date    HX OTHER SURGICAL      melanoma's removed x8 per patient    HX TONSILLECTOMY      child    HX VASECTOMY       Barriers to Learning/Limitations: None  Compensate with: N/A  Home Situation:   Home Situation  Home Environment: Private residence  # Steps to Enter: 3  Rails to Enter: No  One/Two Story Residence: One story  Living Alone: No  Support Systems: Family member(s)  Patient Expects to be Discharged to[de-identified] Private residence  Current DME Used/Available at Home: Cane, straight, Walker, rolling  Critical Behavior:  Neurologic State: Alert  Orientation Level: Oriented X4  Cognition: Follows commands  Safety/Judgement: Fall prevention  Psychosocial  Patient Behaviors: Calm; Cooperative  Purposeful Interaction: Yes  Pt Identified Daily Priority: Clinical issues (comment)  Caritas Process: Nurture loving kindness;Establish trust;Teaching/learning; Attend basic human needs;Create healing environment  Caring Interventions: Therapeutic modalities; Reassure  Reassure: Therapeutic listening;Caring rounds  Therapeutic Modalities: Intentional therapeutic touch                 B LE Strength:    Strength: Within functional limits              B LE Tone & Sensation:   Tone: Normal          Sensation: Intact           B LE Range Of Motion:  AROM: Within functional limits                 Posture:  Posture (WDL): Within defined limits     Functional Mobility:  Bed Mobility:  Rolling: Independent  Supine to Sit: Independent  Sit to Supine: Independent  Scooting: Independent  Transfers:  Sit to Stand: Independent  Stand to Sit: Independent                       Balance:   Sitting: Intact  Standing: Intact    Ambulation/Gait Training:  Gait Description (WDL): (250 feet no AD, no LOB, mild narrow MEGHANN a times)       Therapeutic Exercises:   Reviewed and performed ankle pumps. Pain:  No pain noted before, during, or at end of session. Activity Tolerance:   good  Please refer to the flowsheet for vital signs taken during this treatment. After treatment:   ?         Patient left in no apparent distress sitting up in chair  ? Patient left in no apparent distress in bed  ? Call bell left within reach  ? Personal items in reach  ? Nursing notified Shoaib Boyd  ? Caregiver present  ? Bed/chair alarm activated  ? SCDs applied       COMMUNICATION/EDUCATION:   ?         Role of Physical Therapy in the acute care setting.   ?         Fall prevention education was provided and the patient/caregiver indicated understanding. ? Patient/family have participated as able in goal setting and plan of care. ?         Patient/family agree to work toward stated goals and plan of care. ?         Patient understands intent and goals of therapy, but is neutral about his/her participation. ? Patient is unable to participate in goal setting/plan of care: ongoing with therapy staff. ?         Out of bed with nursing assistance 3-5 times a day. ? Other:     Thank you for this referral.  Sid Dailey, PT, DPT   Time Calculation: 14 mins      Eval Complexity: History: MEDIUM  Complexity : 1-2 comorbidities / personal factors will impact the outcome/ POC Exam:HIGH Complexity : 4+ Standardized tests and measures addressing body structure, function, activity limitation and / or participation in recreation  Presentation: LOW Complexity : Stable, uncomplicated  Clinical Decision Making:Low Complexity    Overall Complexity:LOW

## 2019-10-10 NOTE — ROUTINE PROCESS
Bedside shift change report given to Caitlin Ashby RN. Report included SBAR, Kardex, MAR, Recent Results, Plan of Care, and Cardiac Rhythm (Sinus). Pt in bed resting quietly with call light in reach.

## 2019-10-10 NOTE — DIABETES MGMT
GLYCEMIC CONTROL SCREENING INITIATED:    No results found for: HBA1C, HGBE8, NGF4EYMY   Lab Results   Component Value Date/Time    Glucose 182 (H) 10/10/2019 05:00 AM         [x]  Glucose values exceeding inpatient target range: -180mg/dl            non-ICU 70-180mg/dl      [x]  Recommend corrective insulin per IP Insulin order set. [x]  Standard insulin scale []  Very insuln resistant scale      [x]  Recommend blood glucose monitoring while patient is on steroids.     Beverly Tao RD, CDE

## 2019-10-10 NOTE — PROGRESS NOTES
Bedside shift change report given to Wilson Elise (oncoming nurse) by Aydee Trammell RN (offgoing nurse). Report included the following information SBAR, Kardex and MAR.

## 2019-10-10 NOTE — PROGRESS NOTES
[facility]  Progress Note    Patient: Lauren Garcia Jr. Age: 70 y.o. : 1940 MR#: [id number] SSN: [id number]  Date/Time: 10/10/2019     Subjective:     Review of systems    C/o Throat pain and dysphagia     To day he appears comfortable   No CP  No SOB  No NVD     Case d/w hematology - plan for bone marrow biopsy     Assessment/Plan:   Admitted with severe throat infection history , failed out patient antibiotics , here in hospital patient has severe leucocytosis , Anemia and low Plt . With Blasts      1. Acute Throat infection   - continue antibiotics   - Could not do MRI due to claustrophobia in spite of Ativan , retry with haldol and Benadryl   - Continue current diet   - Still lot of white patches in oropharynx     2 Leucocytosis / Thrombocytopenia / Anemia     - Bine marrow biospy per IR   - s/p BT   - Follow with oncology        Full CODE     GI prophylaxis           Case discussed with:  [x]Patient  [x] Family ( In room with patient )    [x]Nursing  [x]Case Management  DVT Prophylaxis:  []Lovenox  []Hep SQ  [x]SCDs  []Coumadin   []On Heparin gtt          Objective:   VS:   Visit Vitals  /72 (BP 1 Location: Left arm, BP Patient Position: At rest)   Pulse 81   Temp 98.2 °F (36.8 °C)   Resp 18   Ht 5' 9\" (1.753 m)   Wt 74 kg (163 lb 3.2 oz)   SpO2 95%   BMI 24.10 kg/m²      Tmax/24hrs: Temp (24hrs), Av.9 °F (36.6 °C), Min:96.8 °F (36 °C), Max:98.6 °F (37 °C)  IOBRIEF    Intake/Output Summary (Last 24 hours) at 10/10/2019 1400  Last data filed at 10/10/2019 0929  Gross per 24 hour   Intake 3348.3 ml   Output 700 ml   Net 2648. 3 ml       General:  Alert, cooperative, no acute distress   HEENT :  Dirty white  patches over oropharyngeal  area   Cardiovascular: S1S2 - regular , No Murmur   Pulmonary: Equal expansion , No Use of accessory muscles , No Rales No Rhonchi    GI:  +BS in all four quadrants, soft, non-tender  Extremities:  No edema; 2+ dorsalis pedis pulses bilaterally  Neuro: Alert and oriented X 2.        Medications:   Current Facility-Administered Medications   Medication Dose Route Frequency    diphenhydrAMINE (BENADRYL) injection 25 mg  25 mg IntraVENous ONCE PRN    haloperidol lactate (HALDOL) injection 5 mg  5 mg IntraMUSCular ONCE    methylPREDNISolone (PF) (SOLU-MEDROL) injection 30 mg  30 mg IntraVENous Q12H    0.9% sodium chloride infusion 250 mL  250 mL IntraVENous PRN    famotidine (PF) (PEPCID) 20 mg in sodium chloride 0.9% 10 mL injection  20 mg IntraVENous P47L    multivit-folic acid-herbal 567 (WELLESSE PLUS) oral liquid 30 mL  30 mL Oral DAILY    fluconazole (DIFLUCAN) tablet 200 mg  200 mg Oral DAILY    acetaminophen (TYLENOL) solution 650 mg  650 mg Oral Q4H PRN    ondansetron (ZOFRAN) injection 4 mg  4 mg IntraVENous Q4H PRN    polyethylene glycol (MIRALAX) packet 17 g  17 g Oral DAILY    docusate sodium (COLACE) capsule 100 mg  100 mg Oral DAILY    0.9% sodium chloride infusion  100 mL/hr IntraVENous CONTINUOUS       Labs:    Recent Labs     10/10/19  0500 10/09/19  2138 10/09/19  1006 10/09/19  0250   WBC 21.6*  --  61.9* 85.4*   HGB 8.0* 8.1* 7.1* 6.5*   HCT 23.7* 23.8* 21.2* 19.2*   PLT 22*  --  26* 23*     Recent Labs     10/10/19  1133 10/10/19  0500 10/09/19  0250 10/08/19  1200   NA  --  136 138 141   K  --  3.7 3.2* 4.0   CL  --  105 105 105   CO2  --  22 27 26   GLU  --  182* 97 130*   BUN  --  20* 26* 30*   CREA  --  0.91 1.02 1.31*   CA  --  8.2* 8.5 9.9   INR 1.2  --   --   --          Signed By: Neetu Ibarra MD     October 10, 2019

## 2019-10-10 NOTE — PROGRESS NOTES
Problem: Hypertension  Goal: *Blood pressure within specified parameters  Outcome: Progressing Towards Goal  Goal: *Fluid volume balance  Outcome: Progressing Towards Goal  Goal: *Labs within defined limits  Outcome: Progressing Towards Goal     Problem: Falls - Risk of  Goal: *Absence of Falls  Description  Document Vince Calderon Fall Risk and appropriate interventions in the flowsheet.   Outcome: Progressing Towards Goal  Note:   Fall Risk Interventions:       Mentation Interventions: Evaluate medications/consider consulting pharmacy    Medication Interventions: Teach patient to arise slowly                   Problem: Patient Education: Go to Patient Education Activity  Goal: Patient/Family Education  Outcome: Progressing Towards Goal     Problem: Nutrition Deficit  Goal: *Optimize nutritional status  Outcome: Progressing Towards Goal     Problem: Patient Education: Go to Patient Education Activity  Goal: Patient/Family Education  Outcome: Progressing Towards Goal

## 2019-10-11 VITALS
WEIGHT: 165.9 LBS | BODY MASS INDEX: 24.57 KG/M2 | RESPIRATION RATE: 20 BRPM | OXYGEN SATURATION: 98 % | HEIGHT: 69 IN | TEMPERATURE: 97.7 F | DIASTOLIC BLOOD PRESSURE: 94 MMHG | HEART RATE: 101 BPM | SYSTOLIC BLOOD PRESSURE: 184 MMHG

## 2019-10-11 LAB
ANION GAP SERPL CALC-SCNC: 5 MMOL/L (ref 3–18)
BASOPHILS # BLD: 0 K/UL (ref 0–0.06)
BASOPHILS NFR BLD: 0 % (ref 0–3)
BLASTS NFR BLD MANUAL: 60 %
BUN SERPL-MCNC: 19 MG/DL (ref 7–18)
BUN/CREAT SERPL: 23 (ref 12–20)
CALCIUM SERPL-MCNC: 8.2 MG/DL (ref 8.5–10.1)
CHLORIDE SERPL-SCNC: 108 MMOL/L (ref 100–111)
CO2 SERPL-SCNC: 24 MMOL/L (ref 21–32)
CREAT SERPL-MCNC: 0.82 MG/DL (ref 0.6–1.3)
DIFFERENTIAL METHOD BLD: ABNORMAL
EOSINOPHIL # BLD: 0 K/UL (ref 0–0.4)
EOSINOPHIL NFR BLD: 0 % (ref 0–5)
ERYTHROCYTE [DISTWIDTH] IN BLOOD BY AUTOMATED COUNT: 15.6 % (ref 11.6–14.5)
GLUCOSE SERPL-MCNC: 126 MG/DL (ref 74–99)
HCT VFR BLD AUTO: 24.7 % (ref 36–48)
HGB BLD-MCNC: 8.3 G/DL (ref 13–16)
LYMPHOCYTES # BLD: 9.7 K/UL (ref 0.8–3.5)
LYMPHOCYTES NFR BLD: 11 % (ref 20–51)
MCH RBC QN AUTO: 30.6 PG (ref 24–34)
MCHC RBC AUTO-ENTMCNC: 33.6 G/DL (ref 31–37)
MCV RBC AUTO: 91.1 FL (ref 74–97)
METAMYELOCYTES NFR BLD MANUAL: 9 %
MONOCYTES # BLD: 10.6 K/UL (ref 0–1)
MONOCYTES NFR BLD: 12 % (ref 2–9)
NEUTS BAND NFR BLD MANUAL: 1 % (ref 0–5)
NEUTS SEG # BLD: 2.7 K/UL (ref 1.8–8)
NEUTS SEG NFR BLD: 2 % (ref 42–75)
OTHER CELLS NFR BLD MANUAL: 5 %
PLATELET # BLD AUTO: 18 K/UL (ref 135–420)
PLATELET COMMENTS,PCOM: ABNORMAL
PMV BLD AUTO: 10.2 FL (ref 9.2–11.8)
POTASSIUM SERPL-SCNC: 4.3 MMOL/L (ref 3.5–5.5)
RBC # BLD AUTO: 2.71 M/UL (ref 4.7–5.5)
RBC MORPH BLD: ABNORMAL
SODIUM SERPL-SCNC: 137 MMOL/L (ref 136–145)
WBC # BLD AUTO: 88.5 K/UL (ref 4.6–13.2)

## 2019-10-11 PROCEDURE — 85025 COMPLETE CBC W/AUTO DIFF WBC: CPT

## 2019-10-11 PROCEDURE — 80048 BASIC METABOLIC PNL TOTAL CA: CPT

## 2019-10-11 PROCEDURE — 74011250637 HC RX REV CODE- 250/637: Performed by: INTERNAL MEDICINE

## 2019-10-11 PROCEDURE — 74011250636 HC RX REV CODE- 250/636: Performed by: NURSE PRACTITIONER

## 2019-10-11 PROCEDURE — 36415 COLL VENOUS BLD VENIPUNCTURE: CPT

## 2019-10-11 PROCEDURE — 74011000250 HC RX REV CODE- 250: Performed by: NURSE PRACTITIONER

## 2019-10-11 RX ORDER — FAMOTIDINE 10 MG/1
10 TABLET ORAL 2 TIMES DAILY
Qty: 60 TAB | Refills: 0 | Status: SHIPPED | OUTPATIENT
Start: 2019-10-11 | End: 2019-12-13

## 2019-10-11 RX ORDER — FLUCONAZOLE 200 MG/1
200 TABLET ORAL DAILY
Qty: 7 TAB | Refills: 0 | Status: SHIPPED | OUTPATIENT
Start: 2019-10-12 | End: 2019-10-19

## 2019-10-11 RX ORDER — LISINOPRIL AND HYDROCHLOROTHIAZIDE 10; 12.5 MG/1; MG/1
1 TABLET ORAL DAILY
COMMUNITY

## 2019-10-11 RX ORDER — ONDANSETRON 2 MG/ML
4 INJECTION INTRAMUSCULAR; INTRAVENOUS
Qty: 1 VIAL | Refills: 0 | Status: SHIPPED
Start: 2019-10-11 | End: 2019-10-18

## 2019-10-11 RX ORDER — POLYETHYLENE GLYCOL 3350 17 G/17G
17 POWDER, FOR SOLUTION ORAL DAILY
Qty: 30 PACKET | Refills: 0 | Status: SHIPPED | OUTPATIENT
Start: 2019-10-12 | End: 2019-12-13

## 2019-10-11 RX ADMIN — Medication 30 ML: at 08:35

## 2019-10-11 RX ADMIN — FAMOTIDINE 20 MG: 10 INJECTION INTRAVENOUS at 21:23

## 2019-10-11 RX ADMIN — FLUCONAZOLE 200 MG: 200 TABLET ORAL at 08:35

## 2019-10-11 RX ADMIN — FAMOTIDINE 20 MG: 10 INJECTION INTRAVENOUS at 08:35

## 2019-10-11 RX ADMIN — METHYLPREDNISOLONE SODIUM SUCCINATE 30 MG: 40 INJECTION, POWDER, FOR SOLUTION INTRAMUSCULAR; INTRAVENOUS at 21:22

## 2019-10-11 RX ADMIN — METHYLPREDNISOLONE SODIUM SUCCINATE 30 MG: 40 INJECTION, POWDER, FOR SOLUTION INTRAMUSCULAR; INTRAVENOUS at 08:35

## 2019-10-11 NOTE — PROGRESS NOTES
Hematology/Medical Oncology Progress Note             Name: Fransisco Solorzano. : 1940   MRN: [id number]   Date: 10/11/2019 7:40 AM     [x]I have reviewed the flowsheet and previous days notes. Events overnight reviewed and discussed with nursing staff. Vital signs and records reviewed. Shellie Davidson a 70 y. o. male who presents with fatigue and weakness. Weight loss 6 lbs in 2 months. Difficulty with swallowing and eating for 3-4 days secondary to swollen glands and sore throat. Diagnosed with gingivitis and taking PO antibiotics ( Clindamycin) since 19. Was evaluated at [facility]" on 10/6/19 rapid strep negative and negative fungal swab. Platelets at that time 9,000. History of prostate cancer with last radiation therapy mid 2019, Nuprene injection 2019 and last PSA  2019. No oncologist. Urology following up with prostate cancer. Still tolerating full liquid diets. ROS:   Constitutional:  Positive for fever,fatigue, generalized weakness. HENT:Positive for mouth sores, trouble swallowing solids,neck swelling. Eyes: Negative for photophobia, pain, discharge and itching. Respiratory: Negative for apnea, cough, choking, chest tightness, wheezing and stridor. Cardiovascular: Negative for chest pain, palpitations and leg swelling. Gastrointestinal: Negative for abdominal pain. Negative for nausea, diarrhea, constipation, blood in stool and rectal pain. Genitourinary: Negative for dysuria, urgency, hematuria, flank pain and difficulty urinating. Musculoskeletal: Negative for back pain, joint swelling, arthralgias and gait problem. Skin: Negative for color change, pallor, rash and wound. Neurological:  Negative for dizziness, facial asymmetry, speech difficulty, light-headedness and headaches. Hematological: Negative for adenopathy. Does not bruise/bleed easily.    Psychiatric/Behavioral: Negative for hallucinations, confusion, disturbed wake/sleep cycle and agitation. Vital Signs:    Visit Vitals  /71 (BP 1 Location: Right arm, BP Patient Position: At rest)   Pulse 73   Temp 97.6 °F (36.4 °C)   Resp 16   Ht 5' 9\" (1.753 m)   Wt 75.3 kg (165 lb 14.4 oz)   SpO2 100%   BMI 24.50 kg/m²       O2 Device: Room air       Temp (24hrs), Av.9 °F (36.6 °C), Min:97.5 °F (36.4 °C), Max:98.4 °F (36.9 °C)       Intake/Output:   Last shift:      No intake/output data recorded. Last 3 shifts: 10/09 1901 - 10/11 0700  In: 1520 [P.O.:420; I.V.:1100]  Out: 550 [Urine:550]    Intake/Output Summary (Last 24 hours) at 10/11/2019 0706  Last data filed at 10/11/2019 0113  Gross per 24 hour   Intake 1340 ml   Output 250 ml   Net 1090 ml       Physical Exam:  General: Appears comfortable.   HEENT:  Anicteric sclerae; pink palpebral conjunctivae; mucosa moist. Anterior Cervical lymphadenopathy  Resp:  Symmetrical chest expansion, no accessory muscle use; good airway entry; no rales/ wheezing/ rhonchi noted  CV:  S1, S2 present; regular rate and rhythm  GI:  Abdomen soft, non-tender; (+) active bowel sounds  Extremities:  +2 pulses on all extremities; no edema/ cyanosis/ clubbing noted  Skin:  Warm; no rashes/ lesions noted  Neurologic:  Non-focal       DATA:   Current Facility-Administered Medications   Medication Dose Route Frequency    methylPREDNISolone (PF) (SOLU-MEDROL) injection 30 mg  30 mg IntraVENous Q12H    0.9% sodium chloride infusion 250 mL  250 mL IntraVENous PRN    famotidine (PF) (PEPCID) 20 mg in sodium chloride 0.9% 10 mL injection  20 mg IntraVENous C24Q    multivit-folic acid-herbal 103 (WELLESSE PLUS) oral liquid 30 mL  30 mL Oral DAILY    fluconazole (DIFLUCAN) tablet 200 mg  200 mg Oral DAILY    acetaminophen (TYLENOL) solution 650 mg  650 mg Oral Q4H PRN    ondansetron (ZOFRAN) injection 4 mg  4 mg IntraVENous Q4H PRN    polyethylene glycol (MIRALAX) packet 17 g  17 g Oral DAILY    docusate sodium (COLACE) capsule 100 mg  100 mg Oral DAILY    0.9% sodium chloride infusion  100 mL/hr IntraVENous CONTINUOUS                    Labs:  Recent Labs     10/10/19  0500 10/09/19  2138 10/09/19  1006 10/09/19  0250   WBC 21.6*  --  61.9* 85.4*   HGB 8.0* 8.1* 7.1* 6.5*   HCT 23.7* 23.8* 21.2* 19.2*   PLT 22*  --  26* 23*     Recent Labs     10/11/19  0413 10/10/19  1133 10/10/19  0500 10/09/19  0250     --  136 138   K 4.3  --  3.7 3.2*     --  105 105   CO2 24  --  22 27   *  --  182* 97   BUN 19*  --  20* 26*   CREA 0.82  --  0.91 1.02   CA 8.2*  --  8.2* 8.5   INR  --  1.2  --   --      No results for input(s): PH, PCO2, PO2, HCO3, FIO2 in the last 72 hours. IMPRESSION:   · Anemia  · Thrombocytopenia  · Hx prostate Ca        PLAN:   · H/H is  8.3/24.7    · Iron is 118, TIBC 151, Iron % sat 70, Ferritin is 745. Platelet count today is 18.000  continue Solumedrol 30 mg BID IV (  No po 2/2 dsyphagia). · ANC 2.7  , His WBC 88.5  · PSA <0.1 on 9/3/19. Pathology report from peripheral smear 10/8/19 demonstrates numerous immature cells with features consistent with blasts. Flow cytometry results are pending. · CT Abd results above: Liver, Spleen, Pancreas:Unremarkable. Interval development of multiple axillary,mediastinal, abdominal and pelvic nodes. · Preliminary BM Bx results are looking this the patient has an acute myeliod Leukemia. We are awaiting final results including chromosomal testing  to r/o an acute promyelocytic leukemia vs AML. I have discussed the case with [facility];and they will accept the patient as a transfer tentatively today; we will hold Bone Marrow Bx and this will be done at [facility]; Dr. Idania Randall has had face to face discussion with the patient this morning regarding the aforementioned Dx and plans for further treatment. · MRI ORB Face- results now pending.   ·        The patient is: [x] acutely ill Risk of deterioration: [] moderate    [] critically ill  [] high         My assessment/plan was discussed with:  [x]nursing []PT/OT    []respiratory therapy [x]Dr.Lloyd Korina Harden MD      []family []       Lili Santana NP

## 2019-10-11 NOTE — PROGRESS NOTES
Pt has a bed at ICU 132A at [facility]. [facility] transport arranged for 8:30pm today. No further needs at this time.     Sendy Carrillo RN BSN  Care Manager  [phone]

## 2019-10-11 NOTE — PROGRESS NOTES
Infectious Disease  Note    Requested by: dr. Soha Colon    Reason: evaluate for gingivitis, throat infection, leukocytosis    Current abx Prior abx   Fluconazole since 10/9/19      Lines:       Assessment :    70 y. o. male with h/o prostate cancer presented to ed on 10/8/19 with fatigue and weakness. Now with difficulty swallowing, gingival hyperplasia, significant leukocytosis, anemia, thrombocytopenia. Highly complex clinical picture. Dysphagia could be due to candidiasis, cervical lymphadenopathy. Gingival hyperplasia could be related to the systemic illness/?hematological malignancy (profound leukocytosis in absence of definitive infection likely suggest this). Hematology consult appreciated. 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 Plans to transfer patient to [facility] noted. Decreased wbc off abx. Improved dysphagia. CT c/a/p 10/9 suggestive of generalized lymphadenopathy. Recommendations:    1. cont fluconazole till 10/23/19  2. F/u hematology recommendations     Will sign off. F/u prn. thanks     Advance Care planning:full code: discussed  with patient/surrogate decision maker; nikki carpenter: [phone]  Above plan was discussed in details with patient, and dr Claudine Cornejo. Please call me if any further questions or concerns. Will continue to participate in the care of this patient. HPI:    Feels better. Improved ability to swallow. Patient denies fever, headaches, visual disturbances, runny nose, earaches, cp, sob, chills, cough, abdominal pain, diarrhea, burning micturition, pain or weakness in extremities. He denies back pain/flank pain. home Medication List    Details   clindamycin (CLEOCIN) 150 mg capsule Take 150 mg by mouth every six (6) hours. bicalutamide (CASODEX) 50 mg tablet Take 1 Tab by mouth daily. Qty: 90 Tab, Refills: 3    Associated Diagnoses: Prostate cancer ([facility] [address]      lisinopril-hydroCHLOROthiazide (PRINZIDE, ZESTORETIC) 10-12.5 mg per tablet Take 1 Tab by mouth daily.   Qty: 30 Tab, Refills: 0 Current Facility-Administered Medications   Medication Dose Route Frequency    methylPREDNISolone (PF) (SOLU-MEDROL) injection 30 mg  30 mg IntraVENous Q12H    0.9% sodium chloride infusion 250 mL  250 mL IntraVENous PRN    famotidine (PF) (PEPCID) 20 mg in sodium chloride 0.9% 10 mL injection  20 mg IntraVENous W70N    multivit-folic acid-herbal 038 (WELLESSE PLUS) oral liquid 30 mL  30 mL Oral DAILY    fluconazole (DIFLUCAN) tablet 200 mg  200 mg Oral DAILY    acetaminophen (TYLENOL) solution 650 mg  650 mg Oral Q4H PRN    ondansetron (ZOFRAN) injection 4 mg  4 mg IntraVENous Q4H PRN    polyethylene glycol (MIRALAX) packet 17 g  17 g Oral DAILY    docusate sodium (COLACE) capsule 100 mg  100 mg Oral DAILY    0.9% sodium chloride infusion  100 mL/hr IntraVENous CONTINUOUS       Allergies: Sulfa (sulfonamide antibiotics)    Temp (24hrs), Av.1 °F (36.7 °C), Min:97.6 °F (36.4 °C), Max:98.9 °F (37.2 °C)    Visit Vitals  /75 (BP 1 Location: Right arm, BP Patient Position: At rest)   Pulse 75   Temp 98.9 °F (37.2 °C)   Resp 15   Ht 5' 9\" (1.753 m)   Wt 75.3 kg (165 lb 14.4 oz)   SpO2 100%   BMI 24.50 kg/m²       ROS: 12 point ROS obtained in details. Pertinent positives as mentioned in HPI,   otherwise negative    Physical Exam:    Constitutional: He is oriented to person, place, and time. He appears well-developed and well-nourished. HENT:   Head: Normocephalic and atraumatic. Swollen gum. Missing right upper tooth. No erythema of the gums. White patches on gums, posterior pharynx  Eyes: Right eye exhibits no discharge. Left eye exhibits no discharge. Neck: Normal range of motion. Neck supple. Cardiovascular: Normal rate, regular rhythm, normal heart sounds and intact distal pulses. No murmur heard. Pulmonary/Chest: Effort normal and breath sounds normal. No stridor. No respiratory distress. He has no wheezes. He has no rales. He exhibits no tenderness. Abdominal: Soft.  Bowel sounds are normal. He exhibits no distension. There is no tenderness. There is no rebound and no guarding. Musculoskeletal: Normal range of motion. Lymphadenopathy:     He has cervical lymphadenopathy, axillary, inguinal lymphadenopathy - non tender  Neurological: He is alert and oriented to person, place, and time. Skin: Skin is warm and dry. Psychiatric: He has a normal mood and affect. Nursing note and vitals reviewed.       Labs: Results:   Chemistry Recent Labs     10/11/19  0413 10/10/19  0500 10/09/19  0250   * 182* 97    136 138   K 4.3 3.7 3.2*    105 105   CO2 24 22 27   BUN 19* 20* 26*   CREA 0.82 0.91 1.02   CA 8.2* 8.2* 8.5   AGAP 5 9 6   BUCR 23* 22* 25*      CBC w/Diff Recent Labs     10/11/19  0733 10/10/19  0500 10/09/19  2138 10/09/19  1006   WBC 88.5* 21.6*  --  61.9*   RBC 2.71* 2.64*  --  2.33*   HGB 8.3* 8.0* 8.1* 7.1*   HCT 24.7* 23.7* 23.8* 21.2*   PLT 18* 22*  --  26*   GRANS 2* 2*  --  3*   LYMPH 11* 14*  --  10*   EOS 0 0  --  0      Microbiology No results for input(s): CULT in the last 72 hours.        RADIOLOGY:    All available imaging studies/reports in [facility] for this admission were reviewed    Dr. Piyush Raza, Infectious Disease Specialist  [phone]  October 11, 2019  2:16 PM

## 2019-10-11 NOTE — PROGRESS NOTES
Gómez Oneill (ANDREA) called [facility] to schedule transport for patient to [address] with Maddy Espinoza. She stated the dispatch is speaking with their supervisor and she will call me back. Never received a call. I call [facility] and spoke with [place] who put me on hold, she also stated that the dispatch and supervisor were trying to come up a ETA. She will call me back with a decision. Never received a call. Called [facility] again and spoke with Maddy Espinoza who put me hold hold and came back with transport point time between 8:30 pm and 9:00 pm.  Maddy Espinoza stated she had to have the room assignment. Called Mai to receive the room assignment, she gave the room assignment as stated above. Informed Mai of transportation conversation and arrangement.

## 2019-10-11 NOTE — PROGRESS NOTES
Bedside shift change report given to Sofya Max RN (oncoming nurse) by Joe Ivan RN (offgoing nurse). Report included the following information SBAR, Kardex and MAR.

## 2019-10-11 NOTE — DISCHARGE SUMMARY
Discharge Summary     Patient ID:  Lori Agudelo.  [id number]  41 y.o.  1940  Body mass index is 24.5 kg/m². PCP on record: Deshawn Meeks MD    Admit date: 10/8/2019  Discharge date and time: 10/11/2019    Discharge Diagnoses:                                             1 Oropharyngeal infection - Candida     2 AML suspect     3 Leucocytosis     4 Thrombocytopenia     5 Anemia - s/p BT     6 HTN     7 Cachexia - weight loss     8 Moderate protein calorie malnutrition           Consults: ID and Hematology/Oncology          Hospital Course by problems:    Patient with PMH of HTN , Ca prostate admitted with h/o failed out patient antibiotic therapy for throat infection / pain , swelling and leading to dysphagia . Here in hospital he had significant leucocytosis with blasts , oncology consulted , Patient is now being transferred to [facility] for further care for suspected AML. Dysphagia and throat pain improved - he is still on full liquid diet     MRI upper neck and face and head - no acute changes / no lesions / No abscess   CT c/a/p 10/9 suggestive of generalized lymphadenopathy. Not on antibiotics now     Diflucan treatment for 1 week - last dose 10-     Otherwise stable and overall improved symptomatology     Not expecting any acute deterioration during transport to [facility]     D/W [facility]     D/W [facility]     Oncology at [facility] d/w oncology at [facility] - accepting MD Yokasta Amin MD       Patient seen and examined by me on discharge day.   Pertinent Findings:    Patient is Alert Awake and oriented   HEENT - NAD    RS - Clear , no rales no rhonchi   CVS - regular rhythm and rate acceptable    abd - benign, BS present , no Distension   EXT - no edema , no calf tenderness   Neuro - alert and awake , grossly motor and sensory intact         Significant Diagnostic Studies:    RECENT:     Results   CT CHEST ABD PELV W CONT (Accession [id number]) (Order [id number])   Allergies      Not Specified: Sulfa (Sulfonamide Antibiotics)   Exam Information     Status Exam Begun  Exam Ended    Final [99] 10/09/2019 11:03 10/09/2019 11:49   Result Information     Status: Final result (Exam End: 10/9/2019 11:49) Provider Status: Open   Study Result     EXAM:  CT Chest, Abdomen, Pelvis with Contrast.              CLINICAL INDICATION:  Neoplasm - lymphoma              COMPARISON:  CT chest 6 11/12/18, CT abdomen-pelvis 10/22/18, CTA chest  02/01/17. TECHNIQUE:       - Helically scanned volumetric axial sections of the chest, abdomen and pelvis  are obtained following IV and oral contrast administration. Coronal and  sagittal reconstruction images are generated to improve anatomic delineation.  - IV contrast dose 100 mL Isovue-300. - Radiation dose optimization techniques are utilized as appropriate to the  exam, with combination of automated exposure control, adjustment of the mA  and/or kV according to patient's size (Including appropriate matching for  site-specific examinations), or use of iterative reconstruction technique.      FINDINGS:     CT Chest     Lungs:     - Left lower lobe posterior medial aspect lobulated nodular structure is  subcutaneous and observed, with broad pleural interface. The nodule measures  about 3.0 x 2.0 x 2.4 cm (axial #41, coronal #54, sagittal #47). This nodule  measured 2.4 x 1.7 x 2.6 cm on 11/12/18, 2.4 x 1.6 x 2.8 cm on 10/22/18 and 3.1  x 1.7 x 2.9 cm on 02/01/17. Of note, the adjacent pulmonary bronchovascular  markings appear to be pulled in towards this nodular structure instead of being  splayed apart. - Left lower lobe anterior basal region adjacent to the major fissure reveals a  focal peripheral triangular density measuring about 1.7 x 0.8 cm.   This density  may correspond to an area of focal scarring, especially in light of bandlike  pattern rest appreciated on the sagittal reconstruction images.  - No convincing evidence of new parenchymal lung mass or suspicious nodular  structure is identified.     Pleura:  No significant effusion.     Mediastinum-Alejandra:  Interval development of increased number of lymph nodes in  the mediastinum, with distinct increase in the size of the lymph nodes. There  is a precarinal node measures up to about 2.4 x 1.6 cm. As recently as  11/12/18, this precarinal node measured only about 1.2 x 1.0 cm. The subcarinal  node measures up to about 3.1 x 1.47 m (axial #29). The subcarinal node  previously measured about 1.5 x 0.7 cm on 11/12/18. No definite hilar fullness.     Base of Neck:  A few shotty nodes at the base of neck bilaterally. No definite  pathologic enlargement.       Axillae: Both axillae demonstrate increased number of lymph nodes, with  increased conspicuity. The largest axillary node in the left axillary region  measures about 1.5 x 1.1 cm (axial #19). The largest node in the right axillary  region measures about 1.4 x 1.1 cm (axial #11). These nodes appear distinctly  increased compared to 11/12/18.     Esophagus:  Grossly unremarkable. CT Abdomen-Pelvis     Liver, Spleen, Pancreas:  Unremarkable.     Gallbladder:  Irregular contour of the gallbladder. Questionable gallbladder  wall thickening. Perhaps related to presence of gallstones. Recommend  correlation with ultrasound to confirm. No pericholecystic edematous changes.     Adrenal Glands:  Slight right adrenal gland nodule measuring 1.3 x 0.9 cm. Left  adrenal gland with a nodular subtle nodularity measuring 1.3 x 1.0 cm. These  adrenal nodules appear stable dating back to 10/22/18.     Kidneys:  No discrete renal mass. No hydronephrosis. No intrarenal stone.     GI Tract:  Unremarkable stomach and small bowel. The appendix is normal.  No  acute colitis or diverticulitis is detected along the colon.     Nodes:  Enlarged node between the stomach, caudate lobe of the liver and in the  left adrenal gland, measuring about 3.0 x 1.9 cm.   This node was not apparent on  the 10/22/18 study.     Vascular:  No acute aortic abnormalities.     Bladder:  Underdistended. Slight urinary bladder wall thickening, most likely  an artifact related to incomplete distention.     Prostate:  Enlarged prostate, measuring about 5.1 x 4.0 x 5.0 cm. Prior  brachytherapy with at least 3 distinct pellets.     Rectum:  Grossly unremarkable.     Pelvic Sidewall:  Multiple groin nodes are noted bilaterally. The largest node  on the left side measures about 1.7 x 1.2 cm (axial #91). The largest node on  the right side measures about 1.8 x 1.0 cm (axial #90). Multiple small nodes  along the iliac chain bilaterally. Right inguinal canal with low attenuation  ovoid structure probably representing a focal area of sequestered fluid from the  peritoneal cavity, measuring about 2.1 x 1.6 cm (axial #78). IMPRESSION  IMPRESSION:             1.  Left lower lobe nodular structure, waxing and waning in size. Overall  appearance appears similar to prior studies. Query round atelectasis vs. benign  pleura-based nodule. Recommend continued surveillance.      2. Interval development of multiple axillary, mediastinal, abdominal and pelvic  nodes. Of these, groin nodes may be most readily amenable to tissue sampling. The most prominent isolated node is identified adjacent to the stomach. This  node may be amenable to EUS.       3. Irregular appearance of the gallbladder. Ultrasound correlation would be  helpful for further delineation.     4.  Prostate enlargement.   S/p brachytherapy             =====================================================================  =====================================================================  Results   CT ABD PELV W CONT (Accession [id number]) (Order [id number])   Allergies      Not Specified: Sulfa (Sulfonamide Antibiotics)   Exam Information     Status Exam Begun  Exam Ended    Final [99] 10/22/2018 09:06 10/22/2018 09:35   Result Information Status: Final result (Exam End: 10/22/2018 09:35) Provider Status: Reviewed   Study Result     EXAM:  CT ABDOMEN, PELVIS WITH CONTRAST     CLINICAL INDICATION: 79-year-old male with recent diagnosis of prostate cancer      COMPARISON:  CT chest of 2/1/2017     TECHNIQUE:  Helical volumetric CT imaging of the abdomen and pelvis is performed  following IV contrast administration. Coronal and sagittal multiplanar  reconstruction images are generated for improved anatomic delineation.     - IV contrast dose:  100 mL Isovue-300     - All CT scans at this facility are performed using dose optimization technique  as appropriate to the performed exam, to include automated exposure control,  adjustment of the mA and/or kV according to patient's size (Including  appropriate matching for site-specific examinations), or use of iterative  reconstruction technique.     FINDINGS:     ABDOMEN     Lung Bases: Left basal posterior pleural based nodule measures 2.2 x 1.6 cm     Liver: No focal lesion or intrahepatic biliary dilation. Gallbladder: Cholesterol gallstones  The right and left portal veins: Opacified with contrast agent.     Pancreas, Spleen: Normal  Adrenal Glands: Normal right adrenal, slightly prominent left adrenal  Kidneys: Demonstrate symmetric enhancement and filtration of the contrast agent. No calculus, mass, or hydronephrosis.     Gastrointestinal Tract, Abdominal Wall, Mesentery:       - Unremarkable stomach. Small hiatal hernia is noted. - No acute findings along the small bowel. No small bowel obstruction.    - Normal appendix.    - No acute diverticulitis or colitis. - No abdominal wall defect. Fat-containing bilateral inguinal hernias noted.   - No mesenteric adenopathy or abnormal mesenteric fat stranding.     Retroperitoneum:  No adenopathy.       Vascular:  Non-aneurysmal aorta with mild atheromatous changes.     PELVIS     Urinary bladder: Normal, enlarged prostate is noted  Rectum: Unremarkable.     Pelvic sidewall:  No adenopathy. No free fluid.     Bones: Degenerative changes of the symphysis pubis, SI joints, bilateral hips,  and spine, severe bilateral foraminal narrowing at L5-S1     IMPRESSION  IMPRESSION:  1. Pleural-based mass in the left lung base. Recommend CT of chest for further  evaluation. 2. Cholelithiasis. 3. No abdominal/pelvic soft tissue mass or pathologic lymphadenopathy. 4. Enlarged prostate. Results   CT CHEST W CONT (Accession [id number]) (Order [id number])   Allergies      Not Specified: Sulfa (Sulfonamide Antibiotics)   Exam Information     Status Exam Begun  Exam Ended    Final [99] 11/12/2018 10:35 11/12/2018 11:01   Result Information     Status: Final result (Exam End: 11/12/2018 11:01) Provider Status: Reviewed   Study Result     CT CHEST WITH IV CONTRAST        COMPARISON: October 22, 2018 and earlier.     INDICATIONS: Lung nodule.     TECHNIQUE: Following intravenous administration of 100 cc of  Isovue-300volumetric data acquisition was performed through the chest with a  multislice scanner. Reconstructions were created in the axial, coronal, and  sagittal planes.     FINDINGS:      Thyroid/Base Of Neck:  Unremarkable  .     Lungs:   No acute infiltrates are evident. .   The pleural-based opacity at the left posterior chest is again noted measuring  1.7 x 2.2 cm, not significantly changed from October 18. This measures 1.8 x 3.3  cm 2017 February there is a second linear nodular opacity just above scarring in  the lingula is also stable. The bronchi appear unremarkable.        Pleural Spaces: There is no pneumothorax or pleural fluid evident. No pleural plaques are seen     Lymph Nodes:   Axillae: Normal in size and number. Mediastinum / Alejandra: Normal in size and number.     Mediastinum, Great Vessels And Heart: There is no mediastinal mass.   The heart and great vessels appear unremarkable.     Abdomen Structures Included:  Gallstones are seen within the gallbladder. The biliary tree is not dilated. Epigastric abdominal structures otherwise unremarkable.     Osseous Structures: Unremarkable for age.     IMPRESSION  IMPRESSION:      Stable pleural-based nodule posteriorly on the left unchanged from the most  recent CT, decreased when compared with 2017        All CT scans at this facility are performed using dose optimization technique as  appropriate to the performed exam, to include automated exposure control,  adjustment of the mA and/or kV according to patient's size (Including  appropriate matching for site-specific examinations), or use of iterative  reconstruction technique. Pertinent Lab Data:  Recent Labs     10/11/19  0733 10/10/19  0500 10/09/19  2138 10/09/19  1006   WBC 88.5* 21.6*  --  61.9*   HGB 8.3* 8.0* 8.1* 7.1*   HCT 24.7* 23.7* 23.8* 21.2*   PLT 18* 22*  --  26*     Recent Labs     10/11/19  0413 10/10/19  1133 10/10/19  0500 10/09/19  0250     --  136 138   K 4.3  --  3.7 3.2*     --  105 105   CO2 24  --  22 27   *  --  182* 97   BUN 19*  --  20* 26*   CREA 0.82  --  0.91 1.02   CA 8.2*  --  8.2* 8.5   INR  --  1.2  --   --        DISCHARGE MEDICATIONS:   @  Current Discharge Medication List      START taking these medications    Details   famotidine (PEPCID) 10 mg tablet Take 1 Tab by mouth two (2) times a day. Crush the pills  Qty: 60 Tab, Refills: 0      fluconazole (DIFLUCAN) 200 mg tablet Take 1 Tab by mouth daily for 7 days. FDA advises cautious prescribing of oral fluconazole in pregnancy. Last date og Diflucan dose 10-19 , given for oral candida  Qty: 7 Tab, Refills: 0      methylPREDNISolone, PF, (SOLU-MEDROL) 40 mg/mL solution 0.75 mL by IntraVENous route every twelve (12) hours for 7 days.   Qty: 10.5 mL, Refills: 0    Comments: Patient was started on steroids IV as he was not able to take PO , continue until discontinued by [facility]      multivit-folic acid-herbal 838 (WELLESSE PLUS) 400 mcg-200 mg/30 mL liqd oral liquid Take 30 mL by mouth daily for 6 days. Qty: 180 mL, Refills: 0      ondansetron (ZOFRAN) 4 mg/2 mL soln 2 mL by IntraVENous route every four (4) hours as needed for Nausea for up to 7 days. Qty: 1 Vial, Refills: 0      polyethylene glycol (MIRALAX) 17 gram packet Take 1 Packet by mouth daily. Qty: 30 Packet, Refills: 0         CONTINUE these medications which have NOT CHANGED    Details   bicalutamide (CASODEX) 50 mg tablet Take 1 Tab by mouth daily. Qty: 90 Tab, Refills: 3    Associated Diagnoses: Prostate cancer ([facility] [address]         STOP taking these medications       clindamycin (CLEOCIN) 150 mg capsule Comments:   Reason for Stopping:         lisinopril-hydroCHLOROthiazide (PRINZIDE, ZESTORETIC) 10-12.5 mg per tablet Comments:   Reason for Stopping:                 My Recommended Diet, Activity, Wound Care, and follow-up labs are listed in the patient's Discharge Insturctions which I have personally completed and reviewed. Disposition:     [x] Home with family     [] [facility] PT/RN   [] SNF/NH   [] Inpatient Rehab/MOHSEN  Condition at Discharge:  Stable    Follow up with:   PCP : Maikel Ambriz MD      Please follow-up tests/labs that are still pendin.  None  2.    >30 minutes spent coordinating this discharge (review instructions/follow-up, prescriptions, preparing report for sign off)    Signed:  Stacy Escamilla MD  10/11/2019  12:10 PM

## 2019-10-12 LAB
ABO + RH BLD: NORMAL
BLD PROD TYP BPU: NORMAL
BLD PROD TYP BPU: NORMAL
BLOOD GROUP ANTIBODIES SERPL: NORMAL
BPU ID: NORMAL
BPU ID: NORMAL
CALLED TO:,BCALL1: NORMAL
CROSSMATCH RESULT,%XM: NORMAL
CROSSMATCH RESULT,%XM: NORMAL
SPECIMEN EXP DATE BLD: NORMAL
STATUS OF UNIT,%ST: NORMAL
STATUS OF UNIT,%ST: NORMAL
UNIT DIVISION, %UDIV: 0
UNIT DIVISION, %UDIV: 0

## 2019-10-12 NOTE — ROUTINE PROCESS
Bedside verbal report received from Anisha Gilbert RN, reviewed SBAR, VS, [facility] and summary of care. Patient up and about in room, family members at [facility]. Patient denies pain or discomforts, call light in reach.

## 2019-10-12 NOTE — PROGRESS NOTES
Dr Devaughn Arellano called with B/P 178/94, patient's wife at [facility] with home medications to verify Lisinopril dose. No orders given, patient informed.

## 2019-10-12 NOTE — PROGRESS NOTES
Bedside shift change report given to Sandra Rangel (oncoming nurse) by Marcia Harden RN (offgoing nurse). Report included the following information SBAR, Kardex and MAR.

## 2019-10-12 NOTE — ROUTINE PROCESS
Patient transferred/ dischg via ambulance service to [address] [place] [address], wife and daughter at [facility], personal items packed by family members and transported with patient.

## 2019-10-14 LAB
FLOW INTERPRETATION: NORMAL
PATHOLOGIST NAME: NORMAL
SPECIMEN SOURCE: NORMAL

## 2019-12-13 ENCOUNTER — HOSPITAL ENCOUNTER (EMERGENCY)
Age: 79
Discharge: HOME | End: 2019-12-14
Attending: EMERGENCY MEDICINE
Payer: MEDICARE

## 2019-12-13 DIAGNOSIS — D50.8 OTHER IRON DEFICIENCY ANEMIA: Primary | ICD-10-CM

## 2019-12-13 DIAGNOSIS — D69.6 THROMBOCYTOPENIA (HCC): ICD-10-CM

## 2019-12-13 LAB
ALBUMIN SERPL-MCNC: 3 G/DL (ref 3.4–5)
ALBUMIN/GLOB SERPL: 0.9 {RATIO} (ref 0.8–1.7)
ALP SERPL-CCNC: 130 U/L (ref 45–117)
ALT SERPL-CCNC: 27 U/L (ref 16–61)
ANION GAP SERPL CALC-SCNC: 5 MMOL/L (ref 3–18)
APTT PPP: 24.9 SEC (ref 23–36.4)
AST SERPL-CCNC: 13 U/L (ref 10–38)
BASOPHILS # BLD: 0 K/UL (ref 0–0.1)
BASOPHILS NFR BLD: 0 % (ref 0–2)
BILIRUB SERPL-MCNC: 0.7 MG/DL (ref 0.2–1)
BUN SERPL-MCNC: 24 MG/DL (ref 7–18)
BUN/CREAT SERPL: 33 (ref 12–20)
CALCIUM SERPL-MCNC: 8.3 MG/DL (ref 8.5–10.1)
CHLORIDE SERPL-SCNC: 105 MMOL/L (ref 100–111)
CO2 SERPL-SCNC: 26 MMOL/L (ref 21–32)
CREAT SERPL-MCNC: 0.73 MG/DL (ref 0.6–1.3)
DIFFERENTIAL METHOD BLD: ABNORMAL
EOSINOPHIL # BLD: 0 K/UL (ref 0–0.4)
EOSINOPHIL NFR BLD: 0 % (ref 0–5)
ERYTHROCYTE [DISTWIDTH] IN BLOOD BY AUTOMATED COUNT: 17.5 % (ref 11.6–14.5)
GLOBULIN SER CALC-MCNC: 3.2 G/DL (ref 2–4)
GLUCOSE SERPL-MCNC: 105 MG/DL (ref 74–99)
HCT VFR BLD AUTO: 21.3 % (ref 36–48)
HGB BLD-MCNC: 7 G/DL (ref 13–16)
INR PPP: 1.1 (ref 0.8–1.2)
LYMPHOCYTES # BLD: 0.2 K/UL (ref 0.9–3.6)
LYMPHOCYTES NFR BLD: 7 % (ref 21–52)
MCH RBC QN AUTO: 32.3 PG (ref 24–34)
MCHC RBC AUTO-ENTMCNC: 32.9 G/DL (ref 31–37)
MCV RBC AUTO: 98.2 FL (ref 74–97)
MONOCYTES # BLD: 0 K/UL (ref 0.05–1.2)
MONOCYTES NFR BLD: 2 % (ref 3–10)
NEUTS SEG # BLD: 2.1 K/UL (ref 1.8–8)
NEUTS SEG NFR BLD: 91 % (ref 40–73)
PLATELET # BLD AUTO: 2 K/UL (ref 135–420)
PLATELET COMMENTS,PCOM: ABNORMAL
PMV BLD AUTO: 7.2 FL (ref 9.2–11.8)
POTASSIUM SERPL-SCNC: 4.5 MMOL/L (ref 3.5–5.5)
PROT SERPL-MCNC: 6.2 G/DL (ref 6.4–8.2)
PROTHROMBIN TIME: 14.2 SEC (ref 11.5–15.2)
RBC # BLD AUTO: 2.17 M/UL (ref 4.7–5.5)
RBC MORPH BLD: ABNORMAL
SODIUM SERPL-SCNC: 136 MMOL/L (ref 136–145)
WBC # BLD AUTO: 2.3 K/UL (ref 4.6–13.2)

## 2019-12-13 PROCEDURE — 99284 EMERGENCY DEPT VISIT MOD MDM: CPT

## 2019-12-13 PROCEDURE — P9040 RBC LEUKOREDUCED IRRADIATED: HCPCS

## 2019-12-13 PROCEDURE — P9037 PLATE PHERES LEUKOREDU IRRAD: HCPCS

## 2019-12-13 PROCEDURE — 85025 COMPLETE CBC W/AUTO DIFF WBC: CPT

## 2019-12-13 PROCEDURE — 86900 BLOOD TYPING SEROLOGIC ABO: CPT

## 2019-12-13 PROCEDURE — 36430 TRANSFUSION BLD/BLD COMPNT: CPT

## 2019-12-13 PROCEDURE — 85730 THROMBOPLASTIN TIME PARTIAL: CPT

## 2019-12-13 PROCEDURE — 86923 COMPATIBILITY TEST ELECTRIC: CPT

## 2019-12-13 PROCEDURE — 80053 COMPREHEN METABOLIC PANEL: CPT

## 2019-12-13 PROCEDURE — 85610 PROTHROMBIN TIME: CPT

## 2019-12-13 PROCEDURE — 74011250636 HC RX REV CODE- 250/636: Performed by: EMERGENCY MEDICINE

## 2019-12-13 RX ORDER — SODIUM CHLORIDE 9 MG/ML
250 INJECTION, SOLUTION INTRAVENOUS AS NEEDED
Status: DISCONTINUED | OUTPATIENT
Start: 2019-12-13 | End: 2019-12-14 | Stop reason: HOSPADM

## 2019-12-13 RX ORDER — TAMSULOSIN HYDROCHLORIDE 0.4 MG/1
0.4 CAPSULE ORAL DAILY
COMMUNITY

## 2019-12-13 RX ORDER — ACYCLOVIR 400 MG/1
400 TABLET ORAL 2 TIMES DAILY
COMMUNITY

## 2019-12-13 RX ADMIN — SODIUM CHLORIDE 250 ML: 900 INJECTION, SOLUTION INTRAVENOUS at 20:19

## 2019-12-13 RX ADMIN — SODIUM CHLORIDE 250 ML: 900 INJECTION, SOLUTION INTRAVENOUS at 22:19

## 2019-12-13 NOTE — ED PROVIDER NOTES
EMERGENCY DEPARTMENT HISTORY AND PHYSICAL EXAM    1:14 PM  Date: 12/13/2019  Patient Name: Hortensia Kay. History of Presenting Illness     Chief Complaint   Patient presents with    Abnormal Lab Results       History Provided By: patient     HPI: Hortensia Mckeon is a 70 y.o. male with past medical history of AML, state adenocarcinoma, pretension recently completed the course of chemotherapy and [facility] November 15 presents with low hemoglobin and platelets from oncology office. Patient's hemoglobin was 6.8 and platelets 6 according to his papers. No active bleeding         PCP: Michalene Epley, MD    Past History     Past Medical History:  Past Medical History:   Diagnosis Date    Acute myelocytic leukemia ([address]     Hypertension     Leukemia ([address]     Melanoma of skin ([address]     Nausea & vomiting     Prostate CA ([address] 2018    Prostate cancer ([address]        Past Surgical History:  Past Surgical History:   Procedure Laterality Date    HX OTHER SURGICAL      melanoma's removed x8 per patient    HX TONSILLECTOMY      child    HX VASECTOMY         Family History:  Family History   Problem Relation Age of Onset   26 [facility] Cancer Sister         breast       Social History:  Social History     Tobacco Use    Smoking status: Former Smoker    Smokeless tobacco: Never Used    Tobacco comment: quit approx 30 years ago   Substance Use Topics    Alcohol use: Yes     Comment: very seldom    Drug use: No       Allergies: Allergies   Allergen Reactions    Sulfa (Sulfonamide Antibiotics) Other (comments)     As a child told by mother. Does not know reaction. Review of Systems   Review of Systems   Constitutional: Negative for activity change, appetite change and chills. HENT: Negative for congestion, ear discharge, ear pain and sore throat. Eyes: Negative for photophobia and pain. Respiratory: Negative for cough and choking. Cardiovascular: Negative for palpitations and leg swelling. Gastrointestinal: Negative for anal bleeding and rectal pain. Endocrine: Negative for polydipsia and polyuria. Genitourinary: Negative for genital sores and urgency. Musculoskeletal: Negative for arthralgias and myalgias. Neurological: Negative for dizziness, seizures and speech difficulty. Psychiatric/Behavioral: Negative for hallucinations, self-injury and suicidal ideas. Physical Exam     Patient Vitals for the past 12 hrs:   Temp Pulse Resp BP SpO2   12/13/19 1219 97.4 °F (36.3 °C) 86 18 134/69 100 %       Physical Exam  Vitals signs and nursing note reviewed. Constitutional:       Appearance: He is well-developed. HENT:      Head: Normocephalic and atraumatic. Eyes:      General:         Right eye: No discharge. Left eye: No discharge. Neck:      Musculoskeletal: Normal range of motion and neck supple. Cardiovascular:      Rate and Rhythm: Normal rate and regular rhythm. Heart sounds: Normal heart sounds. No murmur. Pulmonary:      Effort: Pulmonary effort is normal. No respiratory distress. Breath sounds: Normal breath sounds. No stridor. No wheezing or rales. Chest:      Chest wall: No tenderness. Abdominal:      General: Bowel sounds are normal. There is no distension. Palpations: Abdomen is soft. Tenderness: There is no tenderness. There is no guarding or rebound. Musculoskeletal: Normal range of motion. Skin:     General: Skin is warm and dry. Neurological:      Mental Status: He is alert and oriented to person, place, and time. Diagnostic Study Results       Medical Decision Making     ED Course: Progress Notes, Reevaluation, and Consults:    1:14 PM Initial assessment performed. The patients presenting problems have been discussed, and they/their family are in agreement with the care plan formulated and outlined with them. I have encouraged them to ask questions as they arise throughout their visit.         Provider Notes (Medical Decision Making):   Patient with low platelets and hemoglobin. Spoke to Dr. Viraj Thompson oncology who advised for leukoreduced platelets and prbc 1 unit  Then patient can be discharged and he will f/u  Platelets:2, UMQ:0.0  Patient care assumed by Dr. Ok Gastelum at 7 pm  Vital Signs-Reviewed the patient's vital signs. Reviewed pt's pulse ox reading. Records Reviewed: (Time of Review: 1:14 PM)  -I am the first provider for this patient.  -I reviewed the vital signs, available nursing notes, past medical history, past surgical history, family history and social history. Current Outpatient Medications   Medication Sig Dispense Refill    acyclovir (ZOVIRAX) 400 mg tablet Take 400 mg by mouth two (2) times a day.  tamsulosin (FLOMAX) 0.4 mg capsule Take 0.4 mg by mouth daily.  lisinopril-hydroCHLOROthiazide (PRINZIDE, ZESTORETIC) 10-12.5 mg per tablet Take 1 Tab by mouth daily. Clinical Impression     Clinical Impression:     Disposition: D/C  home    DISCHARGE NOTE:     Pt has been reexamined. Patient has no new complaints, changes, or physical findings. Care plan outlined and precautions discussed. Results of labs results were reviewed with the patient. All medications were reviewed with the patient; will d/c home. All of pt's questions and concerns were addressed. Patient was instructed and agrees to follow up with , as well as to return to the ED upon further deterioration. Patient is ready to go home. This note was dictated utilizing voice recognition software which may lead to typographical errors. I apologize in advance if the situation occurs. If questions arise please do not hesitate to contact me or call our department. Endy Sigala MD  1:14 PM  1:09 AM :Pt care assumed from Dr. Fredo Grayson , ED provider. Pt complaint(s), current treatment plan, progression and available diagnostic results have been discussed thoroughly.   Plan patient is to get 1 unit of PRC's  and 1 unit of platelet. If no reaction, patient to be discharge. 0105 am Re-assess prior to discharge. Patient states he has no complaints and is ready to be discharge. Dictation disclaimer:  Please note that this dictation was completed with [employer], the computer voice recognition software. Quite often unanticipated grammatical, syntax, homophones, and other interpretive errors are inadvertently transcribed by the computer software. Please disregard these errors. Please excuse any errors that have escaped final proofreading.

## 2019-12-13 NOTE — PROGRESS NOTES
conducted an initial consultation and Spiritual Assessment for Sobia Cintron., who is a 70 y. o.,male. Patients Primary Language is: [place]. According to the patients EMR Confucianist Affiliation is: Odalys Sánchez. The reason the Patient came to the hospital is:   Patient Active Problem List    Diagnosis Date Noted    Thrombocytopenia ([facility] [address] 10/08/2019        The  provided the following Interventions:  Initiated a relationship of care and support. Explored issues of gerardo, belief, spirituality and Jew/ritual needs while hospitalized. Listened empathically. Provided chaplaincy education. Provided information about Spiritual Care Services. Chart reviewed. The following outcomes where achieved:  Patient shared limited information about both their medical narrative and spiritual journey/beliefs.  confirmed Patient's Confucianist Affiliation. Patient processed feeling about current hospitalization. Patient expressed gratitude for 's visit. Assessment:  Patient does not have any Jew/cultural needs that will affect patients preferences in health care. There are no spiritual or Jew issues which require intervention at this time. Plan:  Chaplains will continue to follow and will provide pastoral care on an as needed/requested basis.  recommends bedside caregivers page  on duty if patient shows signs of acute spiritual or emotional distress.     [address]   [phone]

## 2019-12-14 VITALS
DIASTOLIC BLOOD PRESSURE: 75 MMHG | SYSTOLIC BLOOD PRESSURE: 159 MMHG | TEMPERATURE: 97.5 F | HEIGHT: 69 IN | RESPIRATION RATE: 14 BRPM | HEART RATE: 72 BPM | OXYGEN SATURATION: 100 % | WEIGHT: 168 LBS | BODY MASS INDEX: 24.88 KG/M2

## 2019-12-14 LAB
ABO + RH BLD: NORMAL
BLD PROD TYP BPU: NORMAL
BLD PROD TYP BPU: NORMAL
BLOOD GROUP ANTIBODIES SERPL: NORMAL
BPU ID: NORMAL
BPU ID: NORMAL
CROSSMATCH RESULT,%XM: NORMAL
PHYSICIAN INSTRUCTIO,%PI: NORMAL
SPECIMEN EXP DATE BLD: NORMAL
STATUS OF UNIT,%ST: NORMAL
STATUS OF UNIT,%ST: NORMAL
UNIT DIVISION, %UDIV: 0
UNIT DIVISION, %UDIV: 0

## 2019-12-14 NOTE — DISCHARGE INSTRUCTIONS
Patient Education        Learning About Blood Transfusions  What is a blood transfusion? Blood transfusion is a medical treatment to replace the blood or parts of blood that your body has lost. The blood goes through a tube from a bag to an intravenous (IV) catheter and into your vein. You may need a blood transfusion after losing blood from an injury, a major surgery, an illness that causes bleeding, or an illness that destroys blood cells. Transfusions are also used to give you the parts of blood--such as platelets, plasma, or substances that cause clotting--that your body needs to fight an illness or stop bleeding. How is a blood transfusion done? Before you receive a blood transfusion, your blood is tested to find out what your blood type is. Blood or blood parts that are a match with your blood type are ordered by your doctor. Blood is typed as A, B, AB, or O. It is also typed as Rh-positive or Rh-negative. Your blood is also screened to look for antibodies that might react with the blood that is given to you. The blood you are getting is checked and rechecked to make sure that it's the right type for you. A sample of your blood is mixed with a sample of the blood you will receive to check for problems. Before actually giving you the transfusion, a doctor and nurses will look at the label on the package of blood and compare it to your hospital ID bracelet and medical records. The transfusion begins only when all agree that this is the correct blood and that you are the correct person to receive it. To receive the transfusion, you will have an intravenous (IV) catheter inserted into a vein. A tube connects the catheter to the bag containing the blood, which is placed higher than your body. The blood then flows slowly into your vein. A doctor or nurse will check you several times during the transfusion to watch for a reaction or other problems. What are the possible risks?   Blood transfusions have many benefits and are often life-saving. But they also have a few risks. Possible risks include:  · Your body's reaction to receiving new blood. This may include:  ? Fever. ? Allergic reactions. ? Breathing problems. · An infection from the blood. This risk is small because of the strict rules placed on handling and storing blood. Getting a viral infection, such as HIV or hepatitis B or C, through blood transfusions has become very rare. The U.S. Food and Drug Administration (FDA) enforces strict guidelines on the collection, testing, storage, and use of blood. · Getting the wrong blood type by accident. Severe reactions, which can be life-threatening, are very rare. What can you expect after a blood transfusion? Here are some things you can do at home to help prevent infection at the transfusion site:  · Wash the area daily with warm, soapy water, and pat it dry. Don't use hydrogen peroxide or alcohol, which can slow healing. You may cover the area with a gauze bandage if it weeps or rubs against clothing. Change the bandage every day. · Keep the area clean and dry. When should you call for help? Call 911 anytime you think you may need emergency care. For example, call if:  · You have severe trouble breathing. Call your doctor now or seek immediate medical care if:  · You have a fever. · You feel weaker or more tired than usual.  · You have a yellow tint to your skin or the whites of your eyes. Watch closely for changes in your health, and be sure to contact your doctor if you have any problems. Follow-up care is a key part of your treatment and safety. Be sure to make and go to all appointments, and call your doctor if you are having problems. It's also a good idea to know your test results and keep a list of the medicines you take. Where can you learn more? Go to http://nurys-nikos.info/. Enter S520 in the search box to learn more about \"Learning About Blood Transfusions. \"  Current as of: March 28, 2019  Content Version: 12.2  © 3666-2393 [employer]. Care instructions adapted under license by [employer] (which disclaims liability or warranty for this information). If you have questions about a medical condition or this instruction, always ask your healthcare professional. [address] any warranty or liability for your use of this information.

## 2020-01-13 ENCOUNTER — HOSPITAL ENCOUNTER (EMERGENCY)
Age: 80
Discharge: HOME | End: 2020-01-14
Attending: EMERGENCY MEDICINE
Payer: MEDICARE

## 2020-01-13 VITALS
SYSTOLIC BLOOD PRESSURE: 152 MMHG | DIASTOLIC BLOOD PRESSURE: 70 MMHG | OXYGEN SATURATION: 100 % | RESPIRATION RATE: 18 BRPM | HEART RATE: 73 BPM | BODY MASS INDEX: 25.77 KG/M2 | WEIGHT: 174 LBS | HEIGHT: 69 IN | TEMPERATURE: 97.6 F

## 2020-01-13 DIAGNOSIS — D69.6 THROMBOCYTOPENIA (HCC): Primary | ICD-10-CM

## 2020-01-13 DIAGNOSIS — D64.9 ANEMIA, UNSPECIFIED TYPE: ICD-10-CM

## 2020-01-13 DIAGNOSIS — Z85.6 HISTORY OF ACUTE MYELOID LEUKEMIA: ICD-10-CM

## 2020-01-13 LAB
ABO + RH BLD: NORMAL
ANION GAP SERPL CALC-SCNC: 4 MMOL/L (ref 3–18)
BASOPHILS # BLD: 0 K/UL (ref 0–0.06)
BASOPHILS NFR BLD: 0 % (ref 0–3)
BUN SERPL-MCNC: 30 MG/DL (ref 7–18)
BUN/CREAT SERPL: 46 (ref 12–20)
CALCIUM SERPL-MCNC: 8.8 MG/DL (ref 8.5–10.1)
CHLORIDE SERPL-SCNC: 108 MMOL/L (ref 100–111)
CO2 SERPL-SCNC: 28 MMOL/L (ref 21–32)
CREAT SERPL-MCNC: 0.65 MG/DL (ref 0.6–1.3)
DIFFERENTIAL METHOD BLD: ABNORMAL
EOSINOPHIL # BLD: 0.1 K/UL (ref 0–0.4)
EOSINOPHIL NFR BLD: 1 % (ref 0–5)
ERYTHROCYTE [DISTWIDTH] IN BLOOD BY AUTOMATED COUNT: 18.7 % (ref 11.6–14.5)
GLUCOSE SERPL-MCNC: 102 MG/DL (ref 74–99)
HCT VFR BLD AUTO: 23.2 % (ref 36–48)
HGB BLD-MCNC: 7.9 G/DL (ref 13–16)
LYMPHOCYTES # BLD: 0.2 K/UL (ref 0.8–3.5)
LYMPHOCYTES NFR BLD: 2 % (ref 20–51)
MCH RBC QN AUTO: 32.6 PG (ref 24–34)
MCHC RBC AUTO-ENTMCNC: 34.1 G/DL (ref 31–37)
MCV RBC AUTO: 95.9 FL (ref 74–97)
MONOCYTES # BLD: 0.1 K/UL (ref 0–1)
MONOCYTES NFR BLD: 1 % (ref 2–9)
NEUTS SEG # BLD: 9.2 K/UL (ref 1.8–8)
NEUTS SEG NFR BLD: 96 % (ref 42–75)
PLATELET # BLD AUTO: 10 K/UL (ref 135–420)
PLATELET COMMENTS,PCOM: ABNORMAL
POTASSIUM SERPL-SCNC: 4.4 MMOL/L (ref 3.5–5.5)
RBC # BLD AUTO: 2.42 M/UL (ref 4.7–5.5)
RBC MORPH BLD: ABNORMAL
SODIUM SERPL-SCNC: 140 MMOL/L (ref 136–145)
WBC # BLD AUTO: 9.6 K/UL (ref 4.6–13.2)

## 2020-01-13 PROCEDURE — 36430 TRANSFUSION BLD/BLD COMPNT: CPT

## 2020-01-13 PROCEDURE — 99284 EMERGENCY DEPT VISIT MOD MDM: CPT

## 2020-01-13 PROCEDURE — 85025 COMPLETE CBC W/AUTO DIFF WBC: CPT

## 2020-01-13 PROCEDURE — 80048 BASIC METABOLIC PNL TOTAL CA: CPT

## 2020-01-13 PROCEDURE — P9037 PLATE PHERES LEUKOREDU IRRAD: HCPCS

## 2020-01-13 PROCEDURE — 86900 BLOOD TYPING SEROLOGIC ABO: CPT

## 2020-01-13 RX ORDER — SODIUM CHLORIDE 9 MG/ML
250 INJECTION, SOLUTION INTRAVENOUS AS NEEDED
Status: DISCONTINUED | OUTPATIENT
Start: 2020-01-13 | End: 2020-01-14 | Stop reason: HOSPADM

## 2020-01-13 NOTE — ED PROVIDER NOTES
[employer]  [facility] EMERGENCY DEPT      2:25 PM    Date: 1/13/2020  Patient Name: Tish Rodriguez. History of Presenting Illness     Chief Complaint   Patient presents with    Abnormal Lab Results       70 y.o. male with noted past medical history who presents to the emergency department with thrombocytopenia. The patient is a leukemia, specifically AML, who currently sees Dr. Akua Greene for this. He was in Dr. Baldo Olmstead office today and was noted to have a platelet count of 6 as well as a hemoglobin of 7.1. Subsequent to that he was sent to the ER for platelet transfusion. The patient is completely asymptomatic at this time. He denies any spontaneous bleeding anywhere and has had no bleeding with brushing his teeth or any hematuria noted. He has no headache and has not any fall or head trauma. Patient denies any other associated signs or symptoms. Patient denies any other complaints. Nursing notes regarding the HPI and triage nursing notes were reviewed. Prior medical records were reviewed. Current Outpatient Medications   Medication Sig Dispense Refill    acyclovir (ZOVIRAX) 400 mg tablet Take 400 mg by mouth two (2) times a day.  tamsulosin (FLOMAX) 0.4 mg capsule Take 0.4 mg by mouth daily.  lisinopril-hydroCHLOROthiazide (PRINZIDE, ZESTORETIC) 10-12.5 mg per tablet Take 1 Tab by mouth daily.          Past History     Past Medical History:  Past Medical History:   Diagnosis Date    Acute myelocytic leukemia ([address]     Hypertension     Leukemia ([address]     Melanoma of skin ([address]     Nausea & vomiting     Prostate CA ([address] 2018    Prostate cancer ([address]        Past Surgical History:  Past Surgical History:   Procedure Laterality Date    HX OTHER SURGICAL      melanoma's removed x8 per patient    HX TONSILLECTOMY      child    HX VASECTOMY         Family History:  Family History   Problem Relation Age of Onset    Cancer Sister         breast       Social History:  Social History     Tobacco Use    Smoking status: Former Smoker    Smokeless tobacco: Never Used    Tobacco comment: quit approx 30 years ago   Substance Use Topics    Alcohol use: Yes     Comment: very seldom    Drug use: No       Allergies: Allergies   Allergen Reactions    Sulfa (Sulfonamide Antibiotics) Other (comments)     As a child told by mother. Does not know reaction. Patient's primary care provider (as noted in EPIC):  Zaida Corona MD    Review of Systems   Constitutional: Negative for diaphoresis. HENT: Negative for congestion. Eyes: Negative for discharge. Respiratory: Negative for stridor. Cardiovascular: Negative for palpitations. Gastrointestinal: Negative for diarrhea. Endocrine: Negative for heat intolerance. Genitourinary: Negative for flank pain. Musculoskeletal: Negative for back pain. Neurological: Negative for weakness. Psychiatric/Behavioral: Negative for hallucinations. All other systems reviewed and are negative. Visit Vitals  /70 (BP 1 Location: Left arm, BP Patient Position: At rest)   Pulse 81   Temp 98.1 °F (36.7 °C)   Resp 14   Ht 5' 9\" (1.753 m)   Wt 78.9 kg (174 lb)   SpO2 100%   BMI 25.70 kg/m²       PHYSICAL EXAM:    CONSTITUTIONAL:  Alert, in no apparent distress;  well developed;  well nourished. HEAD:  Normocephalic, atraumatic. EYES:  EOMI. Non-icteric sclera. Normal conjunctiva. ENTM:  Nose:  no rhinorrhea. Throat:  no erythema or exudate, mucous membranes moist.  NECK:  No JVD. Supple  RESPIRATORY:  Chest clear, equal breath sounds, good air movement. CARDIOVASCULAR:  Regular rate and rhythm. No murmurs, rubs, or gallops. GI:  Normal bowel sounds, abdomen soft and non-tender. No rebound or guarding. BACK:  Non-tender. UPPER EXT:  Normal inspection. LOWER EXT:  No edema, no calf tenderness. Distal pulses intact. NEURO:  Moves all four extremities. Normal motor exam and sensation in all four extremities.   Normal CN II-XII exam.  Normal bilateral finger-to-nose exam.     SKIN:  No rashes;  Normal for age. PSYCH:  Alert and normal affect. DIFFERENTIAL DIAGNOSES/ MEDICAL DECISION MAKING:   Dehydration, hyperglycemia-induced weakness, electrolyte and/or endocrine imbalance, CVA, intracranial hemorrhage, sepsis, cardiac arrhythmia, central versus peripheral vertigo, illicit drug intoxication, alcohol intoxication, prescribed drug toxicity, pregnancy in females patients, anxiety disorder, versus other etiologies or a combination of the above. ED COURSE:        Diagnostic Study Results     Abnormal lab results from this emergency department encounter:  Labs Reviewed - No data to display    Lab values for this patient within approximately the last 12 hours:  No results found for this or any previous visit (from the past 12 hour(s)). Radiologist and cardiologist interpretations if available at time of this note:  No results found. Medication(s) ordered for patient during this emergency visit encounter:  Medications - No data to display    Medical Decision Making     I am the first provider for this patient. I reviewed the vital signs, available nursing notes, past medical history, past surgical history, family history and social history. Vital Signs:  Reviewed the patient's vital signs. Based on the patient's history of presenting illness, physical examination, laboratory, radiographic, and/or tests results, and response to medical interventions, I believe the patient most likely is having critical thrombocytopenia requiring platelet transfusion. Critical Care Note:  Critical Thrombocytopenia    Critical care minutes: 67 MINUTES.      Given the patients underlying condition of symptomatic anemia with potential for life threatening complications if untreated, urgent blood transfusion was needed, as well as numerous reevaluations of patient's vital signs and response to different emergency department therapies, total bedside time evaluating and/or treating the patient, not including procedures, is noted below. Signout Note      Pt care transferred to Dr. Jose Taylor  ,ED provider. History of patient complaint(s), available diagnostic reports and current treatment plan has been discussed thoroughly. Bedside rounding on patient occured : no . Intended disposition of patient : Discharge. Pending diagnostics reports and/or labs (please list): Transfusion of a sixpack of platelets and then subsequent discharge from the emergency department. SPECIFIC PATIENT INSTRUCTIONS FROM THE PHYSICIAN WHO TREATED YOU IN THE ER TODAY:  1. Return if any concerns or worsening of condition(s). 2. FOLLOW UP APPOINTMENT:  Your primary doctor in 1-2 days. Patient is improved, resting quietly and comfortably. The patient will be discharged home. The patient was reassured that these symptoms do not appear to represent a serious or life threatening condition at this time. Warning signs of worsening condition were discussed and understood by the patient. Based on patient's age, coexisting illness, exam, and the results of this ED evaluation, the decision to treat as an outpatient was made. Based on the information available at time of discharge, acute pathology requiring immediate intervention was deemed relative unlikely. While it is impossible to completely exclude the possibility of underlying serious disease or worsening of condition, I feel the relative likelihood is extremely low. I discussed this uncertainty with the patient, who understood ED evaluation and treatment and felt comfortable with the outpatient treatment plan. All questions regarding care, test results, and follow up were answered. The patient is stable and appropriate to discharge. They understand that they should return to the emergency department for any new or worsening symptoms.  I stressed the importance of follow up for repeat assessment and possibly further evaluation/treatment. Dictation disclaimer:  Please note that this dictation was completed with [employer], the computer voice recognition software. Quite often unanticipated grammatical, syntax, homophones, and other interpretive errors are inadvertently transcribed by the computer software. Please disregard these errors. Please excuse any errors that have escaped final proofreading. Coding Diagnoses     Clinical Impression:   1. Thrombocytopenia ([address]    2. History of acute myeloid leukemia    3. Anemia, unspecified type        Disposition     Disposition: Home. MANDY Jones Board Certified Emergency Physician    Provider Attestation:  If a scribe was utilized in generation of this patient record, I personally performed the services described in the documentation, reviewed the documentation, as recorded by the scribe in my presence, and it accurately records the patient's history of presenting illness, review of systems, patient physical examination, and procedures performed by me as the attending physician. MANDY Jones Board Certified Emergency Physician  1/13/2020.  2:27 PM

## 2020-01-13 NOTE — ED TRIAGE NOTES
The patient was sent here by Dr. Nia Roe for evaluation of abnormal labs. HGB/HCT 7.1/21.6 and platelets 6.

## 2020-01-14 LAB
BLD PROD TYP BPU: NORMAL
BPU ID: NORMAL
CALLED TO:,BCALL1: NORMAL
STATUS OF UNIT,%ST: NORMAL
UNIT DIVISION, %UDIV: 0

## 2020-01-14 NOTE — DISCHARGE INSTRUCTIONS
SPECIFIC PATIENT INSTRUCTIONS FROM THE PHYSICIAN WHO TREATED YOU IN THE ER TODAY:  1. Return if any concerns or worsening of condition(s). 2. FOLLOW UP APPOINTMENT:  Your primary doctor in 1-2 days. Patient Education        Learning About Blood Transfusions  What is a blood transfusion? Blood transfusion is a medical treatment to replace the blood or parts of blood that your body has lost. The blood goes through a tube from a bag to an intravenous (IV) catheter and into your vein. You may need a blood transfusion after losing blood from an injury, a major surgery, an illness that causes bleeding, or an illness that destroys blood cells. Transfusions are also used to give you the parts of blood--such as platelets, plasma, or substances that cause clotting--that your body needs to fight an illness or stop bleeding. How is a blood transfusion done? Before you receive a blood transfusion, your blood is tested to find out what your blood type is. Blood or blood parts that are a match with your blood type are ordered by your doctor. Blood is typed as A, B, AB, or O. It is also typed as Rh-positive or Rh-negative. Your blood is also screened to look for antibodies that might react with the blood that is given to you. The blood you are getting is checked and rechecked to make sure that it's the right type for you. A sample of your blood is mixed with a sample of the blood you will receive to check for problems. Before actually giving you the transfusion, a doctor and nurses will look at the label on the package of blood and compare it to your hospital ID bracelet and medical records. The transfusion begins only when all agree that this is the correct blood and that you are the correct person to receive it. To receive the transfusion, you will have an intravenous (IV) catheter inserted into a vein. A tube connects the catheter to the bag containing the blood, which is placed higher than your body.  The blood then flows slowly into your vein. A doctor or nurse will check you several times during the transfusion to watch for a reaction or other problems. What are the possible risks? Blood transfusions have many benefits and are often life-saving. But they also have a few risks. Possible risks include:  · Your body's reaction to receiving new blood. This may include:  ? Fever. ? Allergic reactions. ? Breathing problems. · An infection from the blood. This risk is small because of the strict rules placed on handling and storing blood. Getting a viral infection, such as HIV or hepatitis B or C, through blood transfusions has become very rare. The U.S. Food and Drug Administration (FDA) enforces strict guidelines on the collection, testing, storage, and use of blood. · Getting the wrong blood type by accident. Severe reactions, which can be life-threatening, are very rare. What can you expect after a blood transfusion? Here are some things you can do at home to help prevent infection at the transfusion site:  · Wash the area daily with warm, soapy water, and pat it dry. Don't use hydrogen peroxide or alcohol, which can slow healing. You may cover the area with a gauze bandage if it weeps or rubs against clothing. Change the bandage every day. · Keep the area clean and dry. When should you call for help? Call 911 anytime you think you may need emergency care. For example, call if:  · You have severe trouble breathing. Call your doctor now or seek immediate medical care if:  · You have a fever. · You feel weaker or more tired than usual.  · You have a yellow tint to your skin or the whites of your eyes. Watch closely for changes in your health, and be sure to contact your doctor if you have any problems. Follow-up care is a key part of your treatment and safety. Be sure to make and go to all appointments, and call your doctor if you are having problems.  It's also a good idea to know your test results and keep a list of the medicines you take. Where can you learn more? Go to http://nurys-nikos.info/. Enter V588 in the search box to learn more about \"Learning About Blood Transfusions. \"  Current as of: March 28, 2019  Content Version: 12.2  © 1186-3977 [employer]. Care instructions adapted under license by [employer] (which disclaims liability or warranty for this information). If you have questions about a medical condition or this instruction, always ask your healthcare professional. [place] [address] any warranty or liability for your use of this information. Patient Education        Thrombocytopenia: Care Instructions  Your Care Instructions    Thrombocytopenia is a low number of platelets in the blood. Platelets are the cells that help blood clot. If you don't have enough of them, your blood cannot clot well. So it is harder to stop bleeding. You may have low platelets because your bone marrow does not make them. Or your body's defenses (immune system) may destroy them. Having an enlarged spleen can also reduce the number of platelets in your blood. This is because they can get trapped in the enlarged spleen. Some diseases or medicines may also cause low platelets. But platelets may go back to normal levels if the disease is treated or the medicine is stopped. You may not need treatment if your problem is mild. If you do need treatment, you may have platelets added to your blood. Or you may get medicine to stop the loss of platelets or help your body make them. Follow-up care is a key part of your treatment and safety. Be sure to make and go to all appointments, and call your doctor if you are having problems. It's also a good idea to know your test results and keep a list of the medicines you take. How can you care for yourself at home? · Be safe with medicines. Take your medicines exactly as prescribed.  Call your doctor if you think you are having a problem with your medicine. · Do not take aspirin or anti-inflammatory medicines unless your doctor says it is okay. Examples are ibuprofen (Advil, Motrin) and naproxen (Aleve). They may increase the risk of bleeding. · Avoid contact sports or activities that could cause you to fall. When should you call for help? Call 911 anytime you think you may need emergency care. For example, call if:    · You passed out (lost consciousness).     · You have signs of severe bleeding, which includes:  ? You have a severe headache that is different from past headaches. ? You vomit blood or what looks like coffee grounds. ? Your stools are maroon or very bloody.    Call your doctor now or seek immediate medical care if:    · You are dizzy or lightheaded, or you feel like you may faint.     · You have abnormal bleeding, such as:  ? A nosebleed that you can't easily stop. ? Your stools are black and look like tar, or they have streaks of blood. ? You have blood in your urine. ? You have joint pain. ? You have bruises or blood spots under your skin.    Watch closely for changes in your health, and be sure to contact your doctor if:    · You do not get better as expected. Where can you learn more? Go to http://nurys-nikos.info/. Enter W852 in the search box to learn more about \"Thrombocytopenia: Care Instructions. \"  Current as of: March 28, 2019  Content Version: 12.2  © 8974-9324 [employer]. Care instructions adapted under license by [employer] (which disclaims liability or warranty for this information). If you have questions about a medical condition or this instruction, always ask your healthcare professional. [address] any warranty or liability for your use of this information. MyChart Activation    Thank you for requesting access to [facility].  Please follow the instructions below to securely access and download your online medical record. [employer] allows you to send messages to your doctor, view your test results, renew your prescriptions, schedule appointments, and more. How Do I Sign Up? In your internet browser, go to [email or web address]. Click on the First Time User? Click Here link in the Sign In box. You will see the New Member Sign Up page. Enter your [employer] Access Code exactly as it appears below. You will not need to use this code after you´ve completed the sign-up process. If you do not sign up before the expiration date, you must request a new code. [employer] Access Code: [email or web address]  Expires: 3/28/2019  2:27 PM (This is the date your [employer] access code will )    Enter the last four digits of your Social Security Number (xxxx) and Date of Birth (mm/dd/yyyy) as indicated and click Submit. You will be taken to the next sign-up page. Create a [employer] ID. This will be your [employer] login ID and cannot be changed, so think of one that is secure and easy to remember. Create a [employer] password. You can change your password at any time. Enter your Password Reset Question and Answer. This can be used at a later time if you forget your password. Enter your e-mail address. You will receive e-mail notification when new information is available in [address] Click Sign Up. You can now view and download portions of your medical record. Click the [place] link to download a portable copy of your medical information. Additional Information    If you have questions, please visit the Frequently Asked Questions section of the [employer] website at [email or web address] Remember, [employer] is NOT to be used for urgent needs. For medical emergencies, dial 911.

## 2020-02-13 ENCOUNTER — HOSPITAL ENCOUNTER (EMERGENCY)
Age: 80
Discharge: HOME | End: 2020-02-14
Attending: EMERGENCY MEDICINE
Payer: MEDICARE

## 2020-02-13 DIAGNOSIS — D61.818 PANCYTOPENIA (HCC): Primary | ICD-10-CM

## 2020-02-13 DIAGNOSIS — Z85.6 HISTORY OF ACUTE MYELOID LEUKEMIA: ICD-10-CM

## 2020-02-13 LAB
ABO + RH BLD: NORMAL
ALBUMIN SERPL-MCNC: 2.9 G/DL (ref 3.4–5)
ALBUMIN/GLOB SERPL: 0.9 {RATIO} (ref 0.8–1.7)
ALP SERPL-CCNC: 61 U/L (ref 45–117)
ALT SERPL-CCNC: 21 U/L (ref 16–61)
ANION GAP SERPL CALC-SCNC: 6 MMOL/L (ref 3–18)
AST SERPL-CCNC: 8 U/L (ref 10–38)
BASOPHILS # BLD: 0 K/UL (ref 0–0.1)
BASOPHILS NFR BLD: 0 % (ref 0–3)
BILIRUB SERPL-MCNC: 0.4 MG/DL (ref 0.2–1)
BLOOD GROUP ANTIBODIES SERPL: NORMAL
BUN SERPL-MCNC: 25 MG/DL (ref 7–18)
BUN/CREAT SERPL: 32 (ref 12–20)
CALCIUM SERPL-MCNC: 8.4 MG/DL (ref 8.5–10.1)
CHLORIDE SERPL-SCNC: 106 MMOL/L (ref 100–111)
CO2 SERPL-SCNC: 27 MMOL/L (ref 21–32)
CREAT SERPL-MCNC: 0.78 MG/DL (ref 0.6–1.3)
DIFFERENTIAL METHOD BLD: ABNORMAL
EOSINOPHIL # BLD: 0 K/UL (ref 0–0.4)
EOSINOPHIL NFR BLD: 0 % (ref 0–5)
ERYTHROCYTE [DISTWIDTH] IN BLOOD BY AUTOMATED COUNT: 17.6 % (ref 11.6–14.5)
GLOBULIN SER CALC-MCNC: 3.3 G/DL (ref 2–4)
GLUCOSE SERPL-MCNC: 124 MG/DL (ref 74–99)
HCT VFR BLD AUTO: 20.8 % (ref 36–48)
HGB BLD-MCNC: 7.1 G/DL (ref 13–16)
LYMPHOCYTES # BLD: 0.1 K/UL (ref 0.8–3.5)
LYMPHOCYTES NFR BLD: 16 % (ref 20–51)
MCH RBC QN AUTO: 32.1 PG (ref 24–34)
MCHC RBC AUTO-ENTMCNC: 34.1 G/DL (ref 31–37)
MCV RBC AUTO: 94.1 FL (ref 74–97)
MONOCYTES # BLD: 0 K/UL (ref 0–1)
MONOCYTES NFR BLD: 4 % (ref 2–9)
NEUTS SEG # BLD: 0.6 K/UL (ref 1.8–8)
NEUTS SEG NFR BLD: 80 % (ref 42–75)
PLATELET # BLD AUTO: 2 K/UL (ref 135–420)
PLATELET COMMENTS,PCOM: ABNORMAL
POTASSIUM SERPL-SCNC: 3.7 MMOL/L (ref 3.5–5.5)
PROT SERPL-MCNC: 6.2 G/DL (ref 6.4–8.2)
RBC # BLD AUTO: 2.21 M/UL (ref 4.7–5.5)
RBC MORPH BLD: ABNORMAL
SODIUM SERPL-SCNC: 139 MMOL/L (ref 136–145)
SPECIMEN EXP DATE BLD: NORMAL
TOTAL CELLS COUNTED SPEC: 50
WBC # BLD AUTO: 0.7 K/UL (ref 4.6–13.2)

## 2020-02-13 PROCEDURE — 96374 THER/PROPH/DIAG INJ IV PUSH: CPT

## 2020-02-13 PROCEDURE — 36430 TRANSFUSION BLD/BLD COMPNT: CPT

## 2020-02-13 PROCEDURE — 74011250637 HC RX REV CODE- 250/637: Performed by: PHYSICIAN ASSISTANT

## 2020-02-13 PROCEDURE — P9037 PLATE PHERES LEUKOREDU IRRAD: HCPCS

## 2020-02-13 PROCEDURE — 74011250636 HC RX REV CODE- 250/636: Performed by: PHYSICIAN ASSISTANT

## 2020-02-13 PROCEDURE — 80053 COMPREHEN METABOLIC PANEL: CPT

## 2020-02-13 PROCEDURE — 85025 COMPLETE CBC W/AUTO DIFF WBC: CPT

## 2020-02-13 PROCEDURE — 86900 BLOOD TYPING SEROLOGIC ABO: CPT

## 2020-02-13 PROCEDURE — 99285 EMERGENCY DEPT VISIT HI MDM: CPT

## 2020-02-13 RX ORDER — SODIUM CHLORIDE 9 MG/ML
250 INJECTION, SOLUTION INTRAVENOUS AS NEEDED
Status: DISCONTINUED | OUTPATIENT
Start: 2020-02-13 | End: 2020-02-14 | Stop reason: HOSPADM

## 2020-02-13 RX ORDER — ACETAMINOPHEN 325 MG/1
650 TABLET ORAL
Status: COMPLETED | OUTPATIENT
Start: 2020-02-13 | End: 2020-02-13

## 2020-02-13 RX ORDER — DIPHENHYDRAMINE HYDROCHLORIDE 50 MG/ML
25 INJECTION, SOLUTION INTRAMUSCULAR; INTRAVENOUS ONCE
Status: COMPLETED | OUTPATIENT
Start: 2020-02-13 | End: 2020-02-13

## 2020-02-13 RX ADMIN — ACETAMINOPHEN 650 MG: 325 TABLET ORAL at 22:44

## 2020-02-13 RX ADMIN — DIPHENHYDRAMINE HYDROCHLORIDE 25 MG: 50 INJECTION INTRAMUSCULAR; INTRAVENOUS at 22:45

## 2020-02-13 NOTE — ED PROVIDER NOTES
EMERGENCY DEPARTMENT HISTORY AND PHYSICAL EXAM    Date: 2/13/2020  Patient Name: Betsy Perez. History of Presenting Illness     Chief Complaint   Patient presents with    Abnormal Lab Results     History Provided By: patient  Chief Complaint: low platelets (platelet value of 1 today) - sent from oncology clinic  Duration: today  Timing:  constant  Severity: denies pain  Modifying Factors: hx of AML  Associated Symptoms: none     Additional History (Context): Betsy Davey is a 70 y.o. male with a history of AML, prostate cancer, hypertension who presents to the emergency department as instructed by his oncology clinic for a platelet transfusion today. Patient notes that he was found to have a platelet value of 1 as well as a hemoglobin level of 7.1. Patient denies any symptoms other than occasionally feeling tired. He denies chest pain, shortness of breath, and weakness. No other concerns at this time. Patient's oncologist is Dr. Minette Rubinstein. Chart review shows that patient was last seen in this emergency department on 1/13/2020 for which she had a platelet count of 6 as well as a hemoglobin of 7.1. He was transfused in the emergency department at that time and was discharged with instruction to follow-up outpatient. PCP: Jonathan Nunes MD    Current Facility-Administered Medications   Medication Dose Route Frequency Provider Last Rate Last Dose    0.9% sodium chloride infusion 250 mL  250 mL IntraVENous PRN ANTONI Cui        acetaminophen (TYLENOL) tablet 650 mg  650 mg Oral NOW Jake Cui        diphenhydrAMINE (BENADRYL) injection 25 mg  25 mg IntraVENous [facility], [place]         Current Outpatient Medications   Medication Sig Dispense Refill    acyclovir (ZOVIRAX) 400 mg tablet Take 400 mg by mouth two (2) times a day.  tamsulosin (FLOMAX) 0.4 mg capsule Take 0.4 mg by mouth daily.       lisinopril-hydroCHLOROthiazide (Vineet Jeter) 10-12.5 mg per tablet Take 1 Tab by mouth daily. Past History     Past Medical History:  Past Medical History:   Diagnosis Date    [facility]     Hypertension     Leukemia ([facility]     Melanoma of skin ([facility]     Nausea & vomiting     Prostate CA ([facility] 2018    Prostate cancer ([facility]      Past Surgical History:  Past Surgical History:   Procedure Laterality Date    HX OTHER SURGICAL      melanoma's removed x8 per patient    HX TONSILLECTOMY      child    HX VASECTOMY       Family History:  Family History   Problem Relation Age of Onset   Job [place] [employer] Sister         breast     Social History:  Social History     Tobacco Use    Smoking status: Former Smoker    Smokeless tobacco: Never Used    Tobacco comment: quit approx 30 years ago   Substance Use Topics    Alcohol use: Yes     Comment: very seldom    Drug use: No     Allergies: Allergies   Allergen Reactions    Sulfa (Sulfonamide Antibiotics) Other (comments)     As a child told by mother. Does not know reaction. Review of Systems   Review of Systems   Constitutional: Negative for chills and fever. HENT: Negative for congestion and sore throat. Respiratory: Negative for cough and shortness of breath. Cardiovascular: Negative for chest pain and palpitations. Gastrointestinal: Negative for abdominal pain and vomiting. Genitourinary: Negative for dysuria and hematuria. Musculoskeletal: Negative for back pain and neck pain. Skin: Negative for rash and wound. Neurological: Negative for dizziness, syncope and weakness. All other systems reviewed and are negative. All Other Systems Negative  Physical Exam     Vitals:    02/13/20 1915 02/13/20 1930 02/13/20 1945 02/13/20 2000   BP: 136/67 138/66 139/64 122/66   Pulse: 85 83 86 79   Resp: 22 21 25 19   Temp:    98.8 °F (37.1 °C)   SpO2: 100% 100% 100% 100%   Weight:       Height:         Physical Exam  Vitals signs and nursing note reviewed.    Constitutional: General: He is not in acute distress. Appearance: Normal appearance. He is not ill-appearing or toxic-appearing. Comments: Afebrile. HENT:      Head: Normocephalic and atraumatic. Right Ear: External ear normal.      Left Ear: External ear normal.      Nose: Nose normal.      Mouth/Throat:      Mouth: Mucous membranes are moist.      Pharynx: Oropharynx is clear. Eyes:      Extraocular Movements: Extraocular movements intact. Conjunctiva/sclera: Conjunctivae normal.   Neck:      Musculoskeletal: Normal range of motion and neck supple. Cardiovascular:      Rate and Rhythm: Regular rhythm. Tachycardia present. Heart sounds: Normal heart sounds. No murmur. No friction rub. No gallop. Pulmonary:      Effort: Pulmonary effort is normal. No respiratory distress. Breath sounds: Normal breath sounds. Musculoskeletal: Normal range of motion. General: No deformity. Skin:     General: Skin is warm and dry. Neurological:      General: No focal deficit present. Mental Status: He is alert and oriented to person, place, and time.    Psychiatric:         Mood and Affect: Mood normal.         Behavior: Behavior normal.       Diagnostic Study Results     Labs -     Recent Results (from the past 12 hour(s))   TYPE & SCREEN    Collection Time: 02/13/20  4:39 PM   Result Value Ref Range    Crossmatch Expiration 02/16/2020     ABO/Rh(D) Maulik Zavala POSITIVE     Antibody screen NEG    CBC WITH AUTOMATED DIFF    Collection Time: 02/13/20  4:42 PM   Result Value Ref Range    WBC 0.7 (LL) 4.6 - 13.2 K/uL    RBC 2.21 (L) 4.70 - 5.50 M/uL    HGB 7.1 (L) 13.0 - 16.0 g/dL    HCT 20.8 (L) 36.0 - 48.0 %    MCV 94.1 74.0 - 97.0 FL    MCH 32.1 24.0 - 34.0 PG    MCHC 34.1 31.0 - 37.0 g/dL    RDW 17.6 (H) 11.6 - 14.5 %    PLATELET 2 (LL) 901 - 420 K/uL    NEUTROPHILS 80 (H) 42 - 75 %    LYMPHOCYTES 16 (L) 20 - 51 %    MONOCYTES 4 2 - 9 %    EOSINOPHILS 0 0 - 5 %    BASOPHILS 0 0 - 3 %    TOTAL CELLS COUNTED 50      ABS. NEUTROPHILS 0.6 (L) 1.8 - 8.0 K/UL    ABS. LYMPHOCYTES 0.1 (L) 0.8 - 3.5 K/UL    ABS. MONOCYTES 0.0 0 - 1.0 K/UL    ABS. EOSINOPHILS 0.0 0.0 - 0.4 K/UL    ABS. BASOPHILS 0.0 0.0 - 0.1 K/UL    PLATELET COMMENTS DECREASED PLATELETS      RBC COMMENTS ANISOCYTOSIS  1+        RBC COMMENTS OVALOCYTES  1+        RBC COMMENTS TEARDROP CELLS  1+        DF MANUAL     METABOLIC PANEL, COMPREHENSIVE    Collection Time: 02/13/20  4:42 PM   Result Value Ref Range    Sodium 139 136 - 145 mmol/L    Potassium 3.7 3.5 - 5.5 mmol/L    Chloride 106 100 - 111 mmol/L    CO2 27 21 - 32 mmol/L    Anion gap 6 3.0 - 18 mmol/L    Glucose 124 (H) 74 - 99 mg/dL    BUN 25 (H) 7.0 - 18 MG/DL    Creatinine 0.78 0.6 - 1.3 MG/DL    BUN/Creatinine ratio 32 (H) 12 - 20      GFR est AA >60 >60 ml/min/1.73m2    GFR est non-AA >60 >60 ml/min/1.73m2    Calcium 8.4 (L) 8.5 - 10.1 MG/DL    Bilirubin, total 0.4 0.2 - 1.0 MG/DL    ALT (SGPT) 21 16 - 61 U/L    AST (SGOT) 8 (L) 10 - 38 U/L    Alk. phosphatase 61 45 - 117 U/L    Protein, total 6.2 (L) 6.4 - 8.2 g/dL    Albumin 2.9 (L) 3.4 - 5.0 g/dL    Globulin 3.3 2.0 - 4.0 g/dL    A-G Ratio 0.9 0.8 - 1.7     PLATELETS, ALLOCATE    Collection Time: 02/13/20  6:00 PM   Result Value Ref Range    CALLED TO: MAMADOU KHANNA RN ON 02/13/20 @2225 BY [facility]     Unit number [id number]     Blood component type [facility]\""1     Unit division 00     Status of unit ALLOCATED      Radiologic Studies -   No orders to display     CT Results  (Last 48 hours)    None        CXR Results  (Last 48 hours)    None        Medical Decision Making   I am the first provider for this patient. I reviewed the vital signs, available nursing notes, past medical history, past surgical history, family history and social history. Vital Signs-Reviewed the patient's vital signs.     Records Reviewed: Nursing Notes and Old Medical Records     Procedures: None     Provider Notes (Medical Decision Making): Patient is a 79-year-old male with a history of acute myeloid leukemia, prostate cancer, and hypertension who presents to the emergency department for a platelet transfusion as instructed by his oncology clinic. Oncology clinic report showed a platelet count of 1 today. Patient denies any symptoms. Vital signs are unremarkable other than a heart rate of 115. Physical exam is also notable for some slight tachycardia, however no other significant abnormalities are found. Will obtain CBC, CMP, and type and screen prior to transfusion process. 5:32 PM  Was notified of pt's WBC level of 0.7 and platelet level of 2. Placing call to pt's oncologist.    5:38 PM  Pending call from oncology. Hemoglobin is 7.1. Pt is pancytopenic. Protective isolation order placed. 5:52 PM  Spoke with patient's oncologist, Dr. Minette Rubinstein, and notified him of patient's platelet count of 2, hemoglobin level of 7.1 and white blood cell level of 0.7 and Dr. Minette Rubinstein stated that patient will require a single unit of leucophor platelets at this time. Because patient's is afebrile, he stated that patient will be stable for discharge as he is neutropenic due to his chemotherapy. If he remains afebrile during his stay he will be ready for discharge after the transfusion. He also instructed me to premedicate the patient with Tylenol and Benadryl prior to receiving the platelets. 5:58 PM  Order for transfusion placed. 6:07 PM  [facility] the [facility] and platelet transfusion. ANTONI Brothers has been notified of all results and the plan up to this point and agrees with the above plan. Pt has been notified of transfer of care to [facility] as well. 1830 hrs-I clarified with  repeat blood testing after transfusion of platelets was not needed.       MED RECONCILIATION:  Current Facility-Administered Medications   Medication Dose Route Frequency    0.9% sodium chloride infusion 250 mL  250 mL IntraVENous PRN    acetaminophen (TYLENOL) tablet 650 mg  650 mg Oral NOW    diphenhydrAMINE (BENADRYL) injection 25 mg  25 mg IntraVENous ONCE     Current Outpatient Medications   Medication Sig    acyclovir (ZOVIRAX) 400 mg tablet Take 400 mg by mouth two (2) times a day.  tamsulosin (FLOMAX) 0.4 mg capsule Take 0.4 mg by mouth daily.  lisinopril-hydroCHLOROthiazide (PRINZIDE, ZESTORETIC) 10-12.5 mg per tablet Take 1 Tab by mouth daily. Disposition:  Home    Discharge Note:    Pt has been reexamined. Patient has no new complaints, changes, or physical findings. Care plan outlined and precautions discussed. Results of labs were reviewed with the patient. All medications were reviewed with the patient; will d/c home with follow up instructions. All of pt's questions and concerns were addressed. Patient was instructed and agrees to follow up with Dr. Michele Yao, as well as to return to the ED upon further deterioration. Patient is ready to go home. Diagnosis     Clinical Impression:   1. Pancytopenia ([address]    2. History of acute myeloid leukemia      \"Please note that this dictation was completed with Minus, the computer voice recognition software. Quite often unanticipated grammatical, syntax, homophones, and other interpretive errors are inadvertently transcribed by the computer software. Please disregard these errors. Please excuse any errors that have escaped final proofreading. \"

## 2020-02-13 NOTE — ED TRIAGE NOTES
Pt states was sent here from oncology for platlet infusion. Paperwork provided by patient indicates platelet count of 1.  Pt states currently on chemo

## 2020-02-13 NOTE — ED NOTES
I performed a brief evaluation, including history and physical, of the patient here in triage and I have determined that pt will need further treatment and evaluation from the main side ER physician. I have placed initial orders to help in expediting patients care. February 13, 2020 at 3:47 PM - Jacqui Morales PA-C      Sent from oncology clinic for platelet infusion.   On chemo    Visit Vitals  /72 (BP 1 Location: Left arm, BP Patient Position: Sitting)   Pulse (!) 115   Temp 97.4 °F (36.3 °C)   Resp 19   SpO2 100%

## 2020-02-13 NOTE — DISCHARGE INSTRUCTIONS
Patient Education        Thrombocytopenia: Care Instructions  Your Care Instructions    Thrombocytopenia is a low number of platelets in the blood. Platelets are the cells that help blood clot. If you don't have enough of them, your blood cannot clot well. So it is harder to stop bleeding. You may have low platelets because your bone marrow does not make them. Or your body's defenses (immune system) may destroy them. Having an enlarged spleen can also reduce the number of platelets in your blood. This is because they can get trapped in the enlarged spleen. Some diseases or medicines may also cause low platelets. But platelets may go back to normal levels if the disease is treated or the medicine is stopped. You may not need treatment if your problem is mild. If you do need treatment, you may have platelets added to your blood. Or you may get medicine to stop the loss of platelets or help your body make them. Follow-up care is a key part of your treatment and safety. Be sure to make and go to all appointments, and call your doctor if you are having problems. It's also a good idea to know your test results and keep a list of the medicines you take. How can you care for yourself at home? · Be safe with medicines. Take your medicines exactly as prescribed. Call your doctor if you think you are having a problem with your medicine. · Do not take aspirin or anti-inflammatory medicines unless your doctor says it is okay. Examples are ibuprofen (Advil, Motrin) and naproxen (Aleve). They may increase the risk of bleeding. · Avoid contact sports or activities that could cause you to fall. When should you call for help? Call 911 anytime you think you may need emergency care. For example, call if:    · You passed out (lost consciousness).     · You have signs of severe bleeding, which includes:  ? You have a severe headache that is different from past headaches.   ? You vomit blood or what looks like coffee grounds. ? Your stools are maroon or very bloody.    Call your doctor now or seek immediate medical care if:    · You are dizzy or lightheaded, or you feel like you may faint.     · You have abnormal bleeding, such as:  ? A nosebleed that you can't easily stop. ? Your stools are black and look like tar, or they have streaks of blood. ? You have blood in your urine. ? You have joint pain. ? You have bruises or blood spots under your skin.    Watch closely for changes in your health, and be sure to contact your doctor if:    · You do not get better as expected. Where can you learn more? Go to http://nurys-nikos.info/. Enter V077 in the search box to learn more about \"Thrombocytopenia: Care Instructions. \"  Current as of: March 28, 2019  Content Version: 12.2  © 2311-7373 [employer]. Care instructions adapted under license by [employer] (which disclaims liability or warranty for this information). If you have questions about a medical condition or this instruction, always ask your healthcare professional. [address] any warranty or liability for your use of this information.

## 2020-02-14 VITALS
WEIGHT: 175.3 LBS | HEIGHT: 69 IN | TEMPERATURE: 98 F | OXYGEN SATURATION: 100 % | RESPIRATION RATE: 25 BRPM | BODY MASS INDEX: 25.96 KG/M2 | DIASTOLIC BLOOD PRESSURE: 65 MMHG | SYSTOLIC BLOOD PRESSURE: 145 MMHG | HEART RATE: 83 BPM

## 2020-02-14 NOTE — ED NOTES
[facility] called [facility] to check the status of platelets. Per Alyce Patton (tech), \"There's a shortage of platelets and it may be hours before unit is available. \" Alyce Patton agreed to contact [employer] to follow up.     1900 - RN notified [place], [place] with the above information. Virgilio Hanson will also follow up with the [facility].

## 2020-02-14 NOTE — ED NOTES
Received bedside shift report from Christiano Lux. Patient is alert and oriented x 4. Patient denies any chest pain, SOB, nausea, diarrhea, vomiting, abd pain. Patient resting comfortably on stretcher. Side rails up x 2. No new complaint. No obvious signs of distress noted. Family at bedside. Per nurse patient is awaiting platelets that will be ready in 3 hours per lab.

## 2020-03-04 ENCOUNTER — HOSPITAL ENCOUNTER (OUTPATIENT)
Dept: LAB | Age: 80
Discharge: HOME | End: 2020-03-04
Payer: MEDICARE

## 2020-03-04 DIAGNOSIS — C61 MALIGNANT NEOPLASM OF PROSTATE (HCC): ICD-10-CM

## 2020-03-04 LAB — PSA SERPL-MCNC: 0 NG/ML (ref 0–4)

## 2020-03-04 PROCEDURE — 84403 ASSAY OF TOTAL TESTOSTERONE: CPT

## 2020-03-04 PROCEDURE — 84153 ASSAY OF PSA TOTAL: CPT

## 2020-03-04 PROCEDURE — 36415 COLL VENOUS BLD VENIPUNCTURE: CPT

## 2020-03-05 LAB — TESTOST SERPL-MCNC: 49 NG/DL (ref 264–916)

## 2020-03-11 ENCOUNTER — HOSPITAL ENCOUNTER (OUTPATIENT)
Dept: RADIATION THERAPY | Age: 80
Discharge: HOME | End: 2020-03-11
Payer: COMMERCIAL

## 2020-03-11 PROCEDURE — 99211 OFF/OP EST MAY X REQ PHY/QHP: CPT

## 2021-09-05 NOTE — PROGRESS NOTES
Patient is a 63y old  Male who presents with a chief complaint of Heart Failure (05 Sep 2021 07:19)    HPI:  62 y/o morbidly obese Male with FamHx of MI (parents), PMHx of HTN, HLD, IDDM c/b retinopathy (s/p recent panretinal photocoagulation of Right eye), HFpEF w/ newly reduced EF (currently ~35% by echo at [place] on 9/3/21, previously 53% by stress echo in 10/2020), s/p AICD ([employer]tronic, placed for unclear reasons), non-obstructive CAD (RPDA 70%, FFR negative by cath in 11/2020), ?Bicuspid AS (noted on cath and in separate outpt MD note in HIE), Moderate AS, TOMASA (not on CPAP), CKD Stage 3 (baseline Cr 1.4-1.7), with multiple CHF admissions in the last several months, who initially presented to [facility] on 9/2/21 for SOB and LE edema, found to have elevated BNP and Trop T (suspected to be 2/2 demand ischemia from CHF exacerbation), pulmonary congestion on CXR, and treated w/ IV diuresis for CHF exacerbation. Pt subsequently had Echo on 9/3/21 which revealed newly reduced EF to 35% from 53% (on Stress Echo in 10/2020) and probable moderate AS. In light of newly depressed EF pt transferred to [facility] for further workup and management.     He has been diuresed on lasix 40 IV BID with creat uptrend from ~1.4 to 1.7 so within his range. Cardiac team plans for cardiac cath given newly reduced EF, renal consulted.    PAST MEDICAL & SURGICAL HISTORY:  Chronic heart failure with preserved ejection fraction (HFpEF)    HTN (hypertension)    HLD (hyperlipidemia)    DM (diabetes mellitus)    TOMASA (obstructive sleep apnea)    Stage 3 chronic kidney disease    CAD (coronary artery disease)    History of appendectomy        Allergies:  No Known Allergies      Home Medications:   aspirin enteric coated 81 milliGRAM(s) Oral daily  atorvastatin 80 milliGRAM(s) Oral at bedtime  carvedilol 25 milliGRAM(s) Oral every 12 hours  clopidogrel Tablet 75 milliGRAM(s) Oral daily  cyclobenzaprine 5 milliGRAM(s) Oral three times a day PRN  dextrose 40% Gel 15 Gram(s) Oral once  dextrose 5%. 1000 milliLiter(s) IV Continuous <Continuous>  dextrose 5%. 1000 milliLiter(s) IV Continuous <Continuous>  dextrose 50% Injectable 25 Gram(s) IV Push once  dextrose 50% Injectable 12.5 Gram(s) IV Push once  dextrose 50% Injectable 25 Gram(s) IV Push once  DULoxetine 60 milliGRAM(s) Oral daily  glucagon  Injectable 1 milliGRAM(s) IntraMuscular once  heparin   Injectable 6000 Unit(s) IV Push every 6 hours PRN  heparin  Infusion.  Unit(s)/Hr IV Continuous <Continuous>  influenza   Vaccine 0.5 milliLiter(s) IntraMuscular once  insulin glargine Injectable (LANTUS) 45 Unit(s) SubCutaneous at bedtime  insulin lispro (ADMELOG) corrective regimen sliding scale   SubCutaneous Before meals and at bedtime  insulin lispro Injectable (ADMELOG) 15 Unit(s) SubCutaneous three times a day before meals  pantoprazole    Tablet 40 milliGRAM(s) Oral before breakfast  sacubitril 49 mG/valsartan 51 mG 1 Tablet(s) Oral two times a day      Hospital Medications:   MEDICATIONS  (STANDING):  aspirin enteric coated 81 milliGRAM(s) Oral daily  atorvastatin 80 milliGRAM(s) Oral at bedtime  carvedilol 25 milliGRAM(s) Oral every 12 hours  clopidogrel Tablet 75 milliGRAM(s) Oral daily  dextrose 40% Gel 15 Gram(s) Oral once  dextrose 5%. 1000 milliLiter(s) (50 mL/Hr) IV Continuous <Continuous>  dextrose 5%. 1000 milliLiter(s) (100 mL/Hr) IV Continuous <Continuous>  dextrose 50% Injectable 25 Gram(s) IV Push once  dextrose 50% Injectable 12.5 Gram(s) IV Push once  dextrose 50% Injectable 25 Gram(s) IV Push once  DULoxetine 60 milliGRAM(s) Oral daily  glucagon  Injectable 1 milliGRAM(s) IntraMuscular once  heparin  Infusion.  Unit(s)/Hr (10 mL/Hr) IV Continuous <Continuous>  influenza   Vaccine 0.5 milliLiter(s) IntraMuscular once  insulin glargine Injectable (LANTUS) 45 Unit(s) SubCutaneous at bedtime  insulin lispro (ADMELOG) corrective regimen sliding scale   SubCutaneous Before meals and at bedtime  insulin lispro Injectable (ADMELOG) 15 Unit(s) SubCutaneous three times a day before meals  pantoprazole    Tablet 40 milliGRAM(s) Oral before breakfast  sacubitril 49 mG/valsartan 51 mG 1 Tablet(s) Oral two times a day      SOCIAL HISTORY:  Denies ETOh, Smoking,     Family History:  FAMILY HISTORY:  FHx: myocardial infarction (Father, Mother)        ROS:  CONSTITUTIONAL: No fever or chills.  HEENT: No headche, visual disturbances, hearing impairment.  RESPIRATORY: No shortness of breath, cough, wheezing or hemoptysis  CARDIOVASCULAR: No Chest pain, shortness of breath, palpitations, dizziness, syncope, orthopnea, PND or leg swelling.  GASTROINTESTINAL: No abdominal pain, nausea, vomiting, diarrhea, hematemesis or blood per rectum.  GENITOURINARY: No urinary frequency, urgency, gross hematuria, dysuria, foamy urine, flank or supra pubic pain, no prior history of kidney stones.  NEUROLOGICAL: No headache,  focal limb weakness, tingling or numbness sensation or seizure like activity  SKIN: No rash or skin lesion  MUSCULOSKELETAL: No leg pain, calf pain or swelling  Psych: Denies suicidal or homicidal ideation    VITALS:  T(F): 97.8 (09-05-21 @ 09:22), Max: 98.4 (09-04-21 @ 22:14)  HR: 63 (09-05-21 @ 12:38)  BP: 114/72 (09-05-21 @ 12:38)  RR: 18 (09-05-21 @ 12:38)  SpO2: 95% (09-05-21 @ 12:38)  Wt(kg): --    09-04 @ 07:01  -  09-05 @ 07:00  --------------------------------------------------------  IN: 1031 mL / OUT: 2575 mL / NET: -1544 mL    09-05 @ 07:01  -  09-05 @ 14:14  --------------------------------------------------------  IN: 684 mL / OUT: 1500 mL / NET: -816 mL        CAPILLARY BLOOD GLUCOSE      POCT Blood Glucose.: 80 mg/dL (05 Sep 2021 12:39)  POCT Blood Glucose.: 119 mg/dL (05 Sep 2021 06:59)  POCT Blood Glucose.: 150 mg/dL (04 Sep 2021 21:51)  POCT Blood Glucose.: 100 mg/dL (04 Sep 2021 17:25)      PHYSICAL EXAM:  GENERAL: Alert, awake, oriented x3   HEENT: SANTIAGO, EOMI, neck supple, no JVP, no carotid bruits, no palpable thyromegaly or lymphadenopathy, no carotid bruits  CHEST/LUNG: Bilateral clear breath sounds, no rales, no crepitations, no wheezing  HEART: Regular rate and rhythm, no murmur, no gallops, no rub   ABDOMEN: Soft, nontender, non distended, bowel sounds present, no palpable organomegaly, no guarding, no rigidity, no rebound tenderness.  : No flank or supra pubic tenderness.  EXTREMITIES: Peripheral pulses are palpable, no pedal edema, no calf tenderness  Musculoskeletal: No joint or spinal tenderness  Neurology: AAOx3, no focal neurological deficit, Motor and sensory systems are intact.  SKIN: No rash or skin lesion   ACCESS: LUE AVF w/bruit and thrill     LABS:  09-05    137  |  101  |  27<H>  ----------------------------<  146<H>  3.6   |  28  |  1.70<H>    Ca    9.1      05 Sep 2021 05:58  Mg     1.8     09-05    TPro  6.2  /  Alb  3.3  /  TBili  1.1  /  DBili      /  AST  16  /  ALT  13  /  AlkPhos  53  09-04    Creatinine Trend: 1.70 <--, 1.58 <--, 1.38 <--                        13.9   7.24  )-----------( 233      ( 05 Sep 2021 05:58 )             43.5       Urine Studies:          09-04-21 @ 07:01  -  09-05-21 @ 07:00  --------------------------------------------------------  IN: 1031 mL / OUT: 2575 mL / NET: -1544 mL    09-05-21 @ 07:01  -  09-05-21 @ 14:14  --------------------------------------------------------  IN: 684 mL / OUT: 1500 mL / NET: -816 mL        RADIOLOGY & ADDITIONAL STUDIES:    EKG findings reviewed.    Imaging Personally Reviewed:  [x] YES  [ ] NO    Consultant(s) and primary physician Notes Reviewed:  [x] YES  [ ] NO    Care Discussed with Primary team/ Consultants/Other Providers [x] YES  [ ] NO   Problem: Dysphagia (Adult)  Goal: *Acute Goals and Plan of Care (Insert Text)  Description  Patient will:  1. Tolerate PO trials with 0 s/s overt distress in 4/5 trials  2. Utilize compensatory swallow strategies/maneuvers (decrease bite/sip, size/rate, alt. liq/sol) with min cues in 4/5 trials  3. Perform oral-motor/laryngeal exercises to increase oropharyngeal swallow function with min cues  4. Complete an objective swallow study (i.e., MBSS) to assess swallow integrity, r/o aspiration, and determine of safest LRD, min A    Rec:     Full thin liquid diet  Aspiration precautions  HOB >45 during po intake, remain >30 for 30-45 minutes after po   Small bites/sips; alternate liquid/solid with slow feeding rate   Oral care TID  Meds per pt preference        Outcome: Progressing Towards Goal    SPEECH LANGUAGE PATHOLOGY BEDSIDE SWALLOW EVALUATION/TREATMENT    Patient: Raffy Garcia Jr. (79 y.o. male)  Date: 10/9/2019  Primary Diagnosis: Thrombocytopenia ([facility] [address] [D69.6]        Precautions: aspiration     PLOF: As per H&P    ASSESSMENT :  Based on the objective data described below, the patient presents with oropharyngeal swallow fxn essentially WFL. Oral mech exam revealed strength, ROM, and coordination of orofacial musculature WFL for speech and deglutition. Swollen lymph nodes noted to palpation with pt complaining of pain with swallowing secondary to swelling. He further reported pain at lower sternal area post swallow. No solid trials given at this time secondary to diet restrictions per MD order. Laryngeal elevation appeared functional/timely to palpation. Recommend to continue full thin liquid diet, aspiration precautions, oral care TID, and meds as tolerated.  ST available for MBS, if silent aspiration is a concern, per MD request. Otherwise, will follow up x 1-2 more visits to ensure safety of PO.     TREATMENT :  Skilled therapy initiated; Educated pt on aspiration precautions and importance of compensatory swallow techniques to decrease aspiration risk (decrease rate of intake & sip/bite size, upright @HOB for all po intake and ~30 minutes after po); verbalized comprehension. Patient will benefit from skilled intervention to address the above impairments. Patient's rehabilitation potential is considered to be Good  Factors which may influence rehabilitation potential include:   ? None noted     PLAN :  Recommendations and Planned Interventions: See above  Frequency/Duration: Patient will be followed by speech-language pathology x 1-2 more visits to address goals. Discharge Recommendations: None     SUBJECTIVE:   Patient stated Sometimes it hurts when I swallow because I'm swollen. OBJECTIVE:     Past Medical History:   Diagnosis Date    Hypertension     Melanoma of skin ([facility] [address]     Nausea & vomiting     Prostate CA ([facility] [address] 2018     Past Surgical History:   Procedure Laterality Date    HX OTHER SURGICAL      melanoma's removed x8 per patient    HX TONSILLECTOMY      child    HX VASECTOMY       Prior Level of Function/Home Situation: see below  Home Situation  Home Environment: Private residence  # Steps to Enter: 3  One/Two Story Residence: One story  Living Alone: No  Support Systems: Family member(s)  Patient Expects to be Discharged to[de-identified] Private residence  Current DME Used/Available at Home: None    Diet prior to admission: reg solid with thin  Current Diet:  full thin liquid     Cognitive and Communication Status:  Neurologic State: Alert  Orientation Level: Oriented X4  Cognition: Follows commands  Oral Assessment:  Oral Assessment  Labial: No impairment  Dentition: Natural;Intact  Oral Hygiene: adequate  Lingual: No impairment  Velum: No impairment  Mandible: No impairment  P.O. Trials:  Patient Position: 90 at side of bed  Vocal quality prior to P.O.: No impairment  Consistency Presented:  Thin liquid  How Presented: Self-fed/presented;Straw  Bolus Acceptance: No impairment  Bolus Formation/Control: No impairment  Propulsion: No impairment  Oral Residue: None  Initiation of Swallow: No impairment  Laryngeal Elevation: Functional  Aspiration Signs/Symptoms: None  Pharyngeal Phase Characteristics: No impairment, issues, or problems   Effective Modifications: None  Cues for Modifications: None     Oral Phase Severity: No impairment  Pharyngeal Phase Severity : No impairment    PAIN:  Start of Eval: 0  End of Eval: 0     After treatment:   ?            Patient left in no apparent distress sitting up in chair  ? Patient left in no apparent distress in bed  ? Call bell left within reach  ? Nursing notified  ? Family present  ? Caregiver present  ? Bed alarm activated    COMMUNICATION/EDUCATION:   ?            Aspiration precautions; swallow safety; compensatory techniques. ?            Patient/family have participated as able in goal setting and plan of care. ? Posted safety precautions in patient's room.     Thank you for this referral.    Avni Cadena M.S. CCC-SLP/L  Speech-Language Pathologist Patient is a 63y old  Male who presents with a chief complaint of Heart Failure (05 Sep 2021 07:19)    HPI:  62 y/o morbidly obese Male with FamHx of MI (parents), PMHx of HTN, HLD, IDDM c/b retinopathy (s/p recent panretinal photocoagulation of Right eye), HFpEF w/ newly reduced EF (currently ~35% by echo at [place] on 9/3/21, previously 53% by stress echo in 10/2020), s/p AICD ([employer], placed for unclear reasons), non-obstructive CAD (RPDA 70%, FFR negative by cath in 11/2020), ?Bicuspid AS (noted on cath and in separate outpt MD note in [facility]IE), Moderate AS, TOMASA (not on CPAP), CKD Stage 3 (baseline Cr 1.4-1.7), with multiple CHF admissions in the last several months, who initially presented to [facility] on 9/2/21 for SOB and LE edema, found to have elevated BNP and Trop T (suspected to be 2/2 demand ischemia from CHF exacerbation), pulmonary congestion on CXR, and treated w/ IV diuresis for CHF exacerbation. Pt subsequently had Echo on 9/3/21 which revealed newly reduced EF to 35% from 53% (on Stress Echo in 10/2020) and probable moderate AS. In light of newly depressed EF pt transferred to [facility] for further workup and management.     He has been diuresed on lasix 40 IV BID with creat uptrend from ~1.4 to 1.7 so within his range. Cardiac team plans for cardiac cath given newly reduced EF, renal consulted.    PAST MEDICAL & SURGICAL HISTORY:  Chronic heart failure with preserved ejection fraction (HFpEF)    HTN (hypertension)    HLD (hyperlipidemia)    DM (diabetes mellitus)    TOMASA (obstructive sleep apnea)    Stage 3 chronic kidney disease    CAD (coronary artery disease)    History of appendectomy        Allergies:  No Known Allergies      Home Medications:   aspirin enteric coated 81 milliGRAM(s) Oral daily  atorvastatin 80 milliGRAM(s) Oral at bedtime  carvedilol 25 milliGRAM(s) Oral every 12 hours  clopidogrel Tablet 75 milliGRAM(s) Oral daily  cyclobenzaprine 5 milliGRAM(s) Oral three times a day PRN  dextrose 40% Gel 15 Gram(s) Oral once  dextrose 5%. 1000 milliLiter(s) IV Continuous <Continuous>  dextrose 5%. 1000 milliLiter(s) IV Continuous <Continuous>  dextrose 50% Injectable 25 Gram(s) IV Push once  dextrose 50% Injectable 12.5 Gram(s) IV Push once  dextrose 50% Injectable 25 Gram(s) IV Push once  DULoxetine 60 milliGRAM(s) Oral daily  glucagon  Injectable 1 milliGRAM(s) IntraMuscular once  heparin   Injectable 6000 Unit(s) IV Push every 6 hours PRN  heparin  Infusion.  Unit(s)/Hr IV Continuous <Continuous>  influenza   Vaccine 0.5 milliLiter(s) IntraMuscular once  insulin glargine Injectable (LANTUS) 45 Unit(s) SubCutaneous at bedtime  insulin lispro (ADMELOG) corrective regimen sliding scale   SubCutaneous Before meals and at bedtime  insulin lispro Injectable (ADMELOG) 15 Unit(s) SubCutaneous three times a day before meals  pantoprazole    Tablet 40 milliGRAM(s) Oral before breakfast  sacubitril 49 mG/valsartan 51 mG 1 Tablet(s) Oral two times a day      Hospital Medications:   MEDICATIONS  (STANDING):  aspirin enteric coated 81 milliGRAM(s) Oral daily  atorvastatin 80 milliGRAM(s) Oral at bedtime  carvedilol 25 milliGRAM(s) Oral every 12 hours  clopidogrel Tablet 75 milliGRAM(s) Oral daily  dextrose 40% Gel 15 Gram(s) Oral once  dextrose 5%. 1000 milliLiter(s) (50 mL/Hr) IV Continuous <Continuous>  dextrose 5%. 1000 milliLiter(s) (100 mL/Hr) IV Continuous <Continuous>  dextrose 50% Injectable 25 Gram(s) IV Push once  dextrose 50% Injectable 12.5 Gram(s) IV Push once  dextrose 50% Injectable 25 Gram(s) IV Push once  DULoxetine 60 milliGRAM(s) Oral daily  glucagon  Injectable 1 milliGRAM(s) IntraMuscular once  heparin  Infusion.  Unit(s)/Hr (10 mL/Hr) IV Continuous <Continuous>  influenza   Vaccine 0.5 milliLiter(s) IntraMuscular once  insulin glargine Injectable (LANTUS) 45 Unit(s) SubCutaneous at bedtime  insulin lispro (ADMELOG) corrective regimen sliding scale   SubCutaneous Before meals and at bedtime  insulin lispro Injectable (ADMELOG) 15 Unit(s) SubCutaneous three times a day before meals  pantoprazole    Tablet 40 milliGRAM(s) Oral before breakfast  sacubitril 49 mG/valsartan 51 mG 1 Tablet(s) Oral two times a day      SOCIAL HISTORY:  Denies ETOh, Smoking,     Family History:  FAMILY HISTORY:  FHx: myocardial infarction (Father, Mother)        ROS:  CONSTITUTIONAL: No fever or chills.  HEENT: No headche, visual disturbances, hearing impairment.  RESPIRATORY: No shortness of breath, cough, wheezing or hemoptysis  CARDIOVASCULAR: No Chest pain, shortness of breath, palpitations, dizziness, syncope, orthopnea, PND or leg swelling.  GASTROINTESTINAL: No abdominal pain, nausea, vomiting, diarrhea, hematemesis or blood per rectum.  GENITOURINARY: No urinary frequency, urgency, gross hematuria, dysuria, foamy urine, flank or supra pubic pain, no prior history of kidney stones.  NEUROLOGICAL: No headache,  focal limb weakness, tingling or numbness sensation or seizure like activity  SKIN: No rash or skin lesion  MUSCULOSKELETAL: No leg pain, calf pain or swelling  Psych: Denies suicidal or homicidal ideation    VITALS:  T(F): 97.8 (09-05-21 @ 09:22), Max: 98.4 (09-04-21 @ 22:14)  HR: 63 (09-05-21 @ 12:38)  BP: 114/72 (09-05-21 @ 12:38)  RR: 18 (09-05-21 @ 12:38)  SpO2: 95% (09-05-21 @ 12:38)  Wt(kg): --    09-04 @ 07:01  -  09-05 @ 07:00  --------------------------------------------------------  IN: 1031 mL / OUT: 2575 mL / NET: -1544 mL    09-05 @ 07:01  -  09-05 @ 14:14  --------------------------------------------------------  IN: 684 mL / OUT: 1500 mL / NET: -816 mL        CAPILLARY BLOOD GLUCOSE      POCT Blood Glucose.: 80 mg/dL (05 Sep 2021 12:39)  POCT Blood Glucose.: 119 mg/dL (05 Sep 2021 06:59)  POCT Blood Glucose.: 150 mg/dL (04 Sep 2021 21:51)  POCT Blood Glucose.: 100 mg/dL (04 Sep 2021 17:25)      PHYSICAL EXAM:  GENERAL: Alert, awake, oriented x3   HEENT: SANTIAGO, EOMI, neck supple, no JVP, no carotid bruits, no palpable thyromegaly or lymphadenopathy, no carotid bruits  CHEST/LUNG: Bilateral clear breath sounds, no rales, no crepitations, no wheezing  HEART: Regular rate and rhythm, no murmur, no gallops, no rub   ABDOMEN: Soft, nontender, non distended, bowel sounds present, no palpable organomegaly, no guarding, no rigidity, no rebound tenderness.  : No flank or supra pubic tenderness.  EXTREMITIES: Peripheral pulses are palpable, no pedal edema, no calf tenderness  Musculoskeletal: No joint or spinal tenderness  Neurology: AAOx3, no focal neurological deficit, Motor and sensory systems are intact.  SKIN: No rash or skin lesion     LABS:  09-05    137  |  101  |  27<H>  ----------------------------<  146<H>  3.6   |  28  |  1.70<H>    Ca    9.1      05 Sep 2021 05:58  Mg     1.8     09-05    TPro  6.2  /  Alb  3.3  /  TBili  1.1  /  DBili      /  AST  16  /  ALT  13  /  AlkPhos  53  09-04    Creatinine Trend: 1.70 <--, 1.58 <--, 1.38 <--                        13.9   7.24  )-----------( 233      ( 05 Sep 2021 05:58 )             43.5       Urine Studies:          09-04-21 @ 07:01  -  09-05-21 @ 07:00  --------------------------------------------------------  IN: 1031 mL / OUT: 2575 mL / NET: -1544 mL    09-05-21 @ 07:01  -  09-05-21 @ 14:14  --------------------------------------------------------  IN: 684 mL / OUT: 1500 mL / NET: -816 mL        RADIOLOGY & ADDITIONAL STUDIES:    EKG findings reviewed.    Imaging Personally Reviewed:  [x] YES  [ ] NO    Consultant(s) and primary physician Notes Reviewed:  [x] YES  [ ] NO    Care Discussed with Primary team/ Consultants/Other Providers [x] YES  [ ] NO

## (undated) DEVICE — SKIN MARKER,REGULAR TIP WITH RULER AND LABELS: Brand: DEVON

## (undated) DEVICE — TABLE COVER: Brand: CONVERTORS

## (undated) DEVICE — STERILE (2.0 X 30CM) COVER: Brand: NEOGUARD™ TRANSDUCER COVER

## (undated) DEVICE — KIT CLN UP BON SECOURS MARYV

## (undated) DEVICE — STERILE POLYISOPRENE POWDER-FREE SURGICAL GLOVES: Brand: PROTEXIS

## (undated) DEVICE — STER SINGLE BASIN SET W/BOWLS: Brand: CARDINAL HEALTH

## (undated) DEVICE — SOLUTION IV 1000ML 0.9% SOD CHL

## (undated) DEVICE — STANDARD HYPODERMIC NEEDLE,ALUMINUM HUB: Brand: MONOJECT

## (undated) DEVICE — 3M™ BAIR PAWS FLEX™ WARMING GOWN, STANDARD, 20 PER CASE 81003: Brand: BAIR PAWS™

## (undated) DEVICE — DRAPE TWL SURG 16X26IN BLU ORB04] ALLCARE INC]

## (undated) DEVICE — GOWN,REINFORCED,POLY,AURORA,XLARGE,STRL: Brand: MEDLINE

## (undated) DEVICE — KENDALL SCD EXPRESS SLEEVES, KNEE LENGTH, MEDIUM: Brand: KENDALL SCD

## (undated) DEVICE — SYR 10ML LUER LOK 1/5ML GRAD --

## (undated) DEVICE — LUB SURG MEDC STRL 2OZ TUBE MC -- MEDICHOICE

## (undated) DEVICE — 4-PORT MANIFOLD: Brand: NEPTUNE 2

## (undated) DEVICE — GAUZE SPONGES,16 PLY: Brand: CURITY